# Patient Record
Sex: FEMALE | Race: BLACK OR AFRICAN AMERICAN | Employment: UNEMPLOYED | URBAN - METROPOLITAN AREA
[De-identification: names, ages, dates, MRNs, and addresses within clinical notes are randomized per-mention and may not be internally consistent; named-entity substitution may affect disease eponyms.]

---

## 2021-07-29 ENCOUNTER — TRANSCRIBE ORDER (OUTPATIENT)
Dept: SCHEDULING | Age: 56
End: 2021-07-29

## 2021-07-29 DIAGNOSIS — Z12.31 VISIT FOR SCREENING MAMMOGRAM: Primary | ICD-10-CM

## 2021-09-01 ENCOUNTER — HOSPITAL ENCOUNTER (OUTPATIENT)
Dept: MAMMOGRAPHY | Age: 56
Discharge: HOME | End: 2021-09-01
Payer: COMMERCIAL

## 2021-09-01 DIAGNOSIS — Z12.31 VISIT FOR SCREENING MAMMOGRAM: ICD-10-CM

## 2021-09-01 PROCEDURE — 77063 BREAST TOMOSYNTHESIS BI: CPT

## 2021-11-09 ENCOUNTER — TRANSCRIBE ORDER (OUTPATIENT)
Dept: REGISTRATION | Age: 56
End: 2021-11-09

## 2021-11-09 ENCOUNTER — HOSPITAL ENCOUNTER (OUTPATIENT)
Dept: GENERAL RADIOLOGY | Age: 56
Discharge: HOME | End: 2021-11-09
Payer: COMMERCIAL

## 2021-11-09 DIAGNOSIS — R06.02 SHORTNESS OF BREATH: Primary | ICD-10-CM

## 2021-11-09 DIAGNOSIS — R06.02 SHORTNESS OF BREATH: ICD-10-CM

## 2021-11-09 PROCEDURE — 71046 X-RAY EXAM CHEST 2 VIEWS: CPT

## 2021-11-17 ENCOUNTER — TRANSCRIBE ORDER (OUTPATIENT)
Dept: SCHEDULING | Age: 56
End: 2021-11-17

## 2021-11-17 DIAGNOSIS — R10.9 PAIN, ABDOMINAL: Primary | ICD-10-CM

## 2021-11-22 ENCOUNTER — HOSPITAL ENCOUNTER (OUTPATIENT)
Dept: CT IMAGING | Age: 56
Discharge: HOME | End: 2021-11-22
Attending: INTERNAL MEDICINE
Payer: COMMERCIAL

## 2021-11-22 DIAGNOSIS — R10.9 PAIN, ABDOMINAL: ICD-10-CM

## 2021-11-22 LAB
BUN SERPL-MCNC: 8 MG/DL (ref 6–20)
CREAT SERPL-MCNC: 0.72 MG/DL (ref 0.55–1.02)

## 2021-11-22 PROCEDURE — 74011000636 HC RX REV CODE- 636: Performed by: INTERNAL MEDICINE

## 2021-11-22 PROCEDURE — 74178 CT ABD&PLV WO CNTR FLWD CNTR: CPT

## 2021-11-22 PROCEDURE — 82565 ASSAY OF CREATININE: CPT

## 2021-11-22 PROCEDURE — 84520 ASSAY OF UREA NITROGEN: CPT

## 2021-11-22 PROCEDURE — 36415 COLL VENOUS BLD VENIPUNCTURE: CPT

## 2021-11-22 RX ADMIN — IOPAMIDOL 100 ML: 755 INJECTION, SOLUTION INTRAVENOUS at 14:06

## 2021-12-15 ENCOUNTER — TRANSCRIBE ORDER (OUTPATIENT)
Dept: SCHEDULING | Age: 56
End: 2021-12-15

## 2021-12-15 ENCOUNTER — OFFICE VISIT (OUTPATIENT)
Dept: SURGERY | Age: 56
End: 2021-12-15
Payer: COMMERCIAL

## 2021-12-15 VITALS
HEART RATE: 71 BPM | TEMPERATURE: 98.2 F | OXYGEN SATURATION: 98 % | DIASTOLIC BLOOD PRESSURE: 77 MMHG | SYSTOLIC BLOOD PRESSURE: 126 MMHG | RESPIRATION RATE: 16 BRPM | WEIGHT: 149 LBS | BODY MASS INDEX: 30.04 KG/M2 | HEIGHT: 59 IN

## 2021-12-15 DIAGNOSIS — C77.2 CANCER OF SIGMOID COLON METASTATIC TO INTRA-ABDOMINAL LYMPH NODE (HCC): Primary | ICD-10-CM

## 2021-12-15 DIAGNOSIS — C18.7 CANCER OF SIGMOID COLON METASTATIC TO INTRA-ABDOMINAL LYMPH NODE (HCC): Primary | ICD-10-CM

## 2021-12-15 DIAGNOSIS — D50.9 IRON DEFICIENCY ANEMIA, UNSPECIFIED: Primary | ICD-10-CM

## 2021-12-15 PROCEDURE — 99204 OFFICE O/P NEW MOD 45 MIN: CPT | Performed by: COLON & RECTAL SURGERY

## 2021-12-15 RX ORDER — BUPROPION HYDROCHLORIDE 150 MG/1
1 TABLET ORAL DAILY
COMMUNITY
Start: 2021-11-24

## 2021-12-15 RX ORDER — OMEPRAZOLE 20 MG/1
1 CAPSULE, DELAYED RELEASE ORAL DAILY
COMMUNITY
Start: 2021-11-02

## 2021-12-15 RX ORDER — BUSPIRONE HYDROCHLORIDE 7.5 MG/1
15 TABLET ORAL 2 TIMES DAILY
COMMUNITY
Start: 2021-11-24

## 2021-12-15 RX ORDER — QUETIAPINE FUMARATE 100 MG/1
100 TABLET, FILM COATED ORAL DAILY
COMMUNITY
Start: 2021-11-22

## 2021-12-15 RX ORDER — OXCARBAZEPINE 300 MG/1
1 TABLET, FILM COATED ORAL
COMMUNITY
Start: 2021-11-23

## 2021-12-15 NOTE — H&P (VIEW-ONLY)
OFFICE VISIT NOTE    Fabiana Alfonso is a 49 y.o. female who presents to the office today for:    Chief Complaint   Patient presents with    New Patient     Referred by  for Colon Cancer       Ms. Sabrina Shrestha is referred today for evaluation of a newly diagnosed sigmoid colon cancer. She was seen by gastroenterology with complaints of having mucousy bloody bowel movements. She had a colonoscopy that showed a nearly obstructing cancer/mass in the proximal sigmoid colon which was biopsied and was consistent with adenocarcinoma. She had a CT scan of the abdomen pelvis which demonstrated:  IMPRESSION  1. Segmental circumferential wall thickening and likely intraluminal mass  within the proximal sigmoid colon highly suspicious for colonic malignancy. Multiple enlarged mesenteric lymph nodes suggestive local spread. 2.  Multiple ill-defined hypodensities in the liver are nonspecific. Considerations include early fat deposition or metastatic disease. 3.  Findings suggestive of anemia. Is referred now for surgical evaluation and management. Today she states he continues to have bloody mucousy bowel movements. She also continues to have cramping abdominal pain in the upper abdomen.         Past Medical History:   Diagnosis Date    Anemia     Depression        Past Surgical History:   Procedure Laterality Date    HX  SECTION      2    HX COLONOSCOPY  2021           Family History   Adopted: Yes   Problem Relation Age of Onset    Colon Cancer Mother        Social History     Socioeconomic History    Marital status:      Spouse name: Not on file    Number of children: Not on file    Years of education: Not on file    Highest education level: Not on file   Occupational History    Not on file   Tobacco Use    Smoking status: Current Some Day Smoker Packs/day: 0.25    Smokeless tobacco: Never Used    Tobacco comment: occasionally    Vaping Use    Vaping Use: Never used   Substance and Sexual Activity    Alcohol use: Yes     Comment: occasionally    Drug use: Yes     Types: Marijuana     Comment: occasionally     Sexual activity: Yes     Partners: Male     Birth control/protection: Condom   Other Topics Concern    Not on file   Social History Narrative    Not on file     Social Determinants of Health     Financial Resource Strain:     Difficulty of Paying Living Expenses: Not on file   Food Insecurity:     Worried About Running Out of Food in the Last Year: Not on file    Kalie of Food in the Last Year: Not on file   Transportation Needs:     Lack of Transportation (Medical): Not on file    Lack of Transportation (Non-Medical): Not on file   Physical Activity:     Days of Exercise per Week: Not on file    Minutes of Exercise per Session: Not on file   Stress:     Feeling of Stress : Not on file   Social Connections:     Frequency of Communication with Friends and Family: Not on file    Frequency of Social Gatherings with Friends and Family: Not on file    Attends Mandaen Services: Not on file    Active Member of [address] [place] [address] or Organizations: Not on file    Attends Club or Organization Meetings: Not on file    Marital Status: Not on file   Intimate Partner Violence:     Fear of Current or Ex-Partner: Not on file    Emotionally Abused: Not on file    Physically Abused: Not on file    Sexually Abused: Not on file   Housing Stability:     Unable to Pay for Housing in the Last Year: Not on file    Number of [place] in the Last Year: Not on file    Unstable Housing in the Last Year: Not on file       No Known Allergies    Current Outpatient Medications   Medication Sig    buPROPion XL (WELLBUTRIN XL) 150 mg tablet Take 1 Tablet by mouth daily.  OXcarbazepine (TRILEPTAL) 300 mg tablet Take 1 Tablet by mouth nightly.     busPIRone (BUSPAR) 7.5 mg tablet Take 1 Tablet by mouth two (2) times a day.  omeprazole (PRILOSEC) 20 mg capsule Take 1 Capsule by mouth daily.  QUEtiapine (SEROquel) 100 mg tablet Take 1 Tablet by mouth as needed. No current facility-administered medications for this visit. Review of Systems   Constitutional: Positive for malaise/fatigue. HENT: Negative. Eyes: Negative. Respiratory: Negative. Cardiovascular: Negative. Gastrointestinal: Positive for blood in stool and diarrhea. Genitourinary: Positive for frequency. Musculoskeletal: Negative. Skin: Negative. Neurological: Negative. Endo/Heme/Allergies: Negative. Psychiatric/Behavioral: Positive for depression. The patient has insomnia. /77 (BP 1 Location: Left arm, BP Patient Position: Sitting)   Pulse 71   Temp 98.2 °F (36.8 °C) (Temporal)   Resp 16   Ht 4' 11\" (1.499 m)   Wt 149 lb (67.6 kg)   SpO2 98%   BMI 30.09 kg/m²     Physical Exam  Constitutional:       General: She is not in acute distress. Appearance: Normal appearance. She is obese. She is not ill-appearing. HENT:      Head: Normocephalic and atraumatic. Cardiovascular:      Rate and Rhythm: Normal rate and regular rhythm. Heart sounds: Normal heart sounds. Pulmonary:      Breath sounds: Normal breath sounds. Abdominal:      General: There is no distension. Palpations: Abdomen is soft. There is no mass. Tenderness: There is no abdominal tenderness. Hernia: No hernia is present. Musculoskeletal:         General: Normal range of motion. Cervical back: Normal range of motion and neck supple. Skin:     General: Skin is warm and dry. Neurological:      General: No focal deficit present. Mental Status: She is alert and oriented to person, place, and time. Psychiatric:         Mood and Affect: Mood normal.         Thought Content:  Thought content normal.         Judgment: Judgment normal.         Problem List Items Addressed This Visit     None      Visit Diagnoses     Cancer of sigmoid colon metastatic to intra-abdominal lymph node ([facility] [address]    -  Primary    Relevant Medications    omeprazole (PRILOSEC) 20 mg capsule    Other Relevant Orders    PET/CT NE TUMOR IMAGE SKULL THIGH DOTATATE (INI)          Assessment and Plan:  I had extensive discussion with the patient regarding her endoscopic and radiographic findings. I explained to her that she has a large mass in the proximal sigmoid colon which is near obstructing. I recommended surgery I told she will need to have a low anterior resection with colorectal anastomosis. I will remove the entire ivette basin as it is evidence of enlarged lymph nodes on the CT. I reviewed the risk and benefits occluding the risk of infection, bleeding, wound complications, anastomotic leak, injury to other organs and structures including the ureter. I told her given the location and CT findings up she will need to have ureteric stents placed for identification of the ureter during the case. She is amenable to having the surgery and surgery has been scheduled for December 28, 2021. I have also ordered a PET CT scan to evaluate for metastatic disease given the questions raised in her CT scan of liver lesions.     I also told her that given the fact that she does drink 3 to 4 24-ounce beers a day that she will be carefully monitored for any signs of alcohol withdrawal while in the hospital.            Tawanna Montoya MD

## 2021-12-15 NOTE — PROGRESS NOTES
Chief Complaint   Patient presents with    New Patient     Referred by  for Colon Cancer     Visit Vitals  /77 (BP 1 Location: Left arm, BP Patient Position: Sitting)   Pulse 71   Temp 98.2 °F (36.8 °C) (Temporal)   Resp 16   Ht 4' 11\" (1.499 m)   Wt 149 lb (67.6 kg)   SpO2 98%   BMI 30.09 kg/m²

## 2021-12-15 NOTE — PROGRESS NOTES
OFFICE VISIT NOTE    Liv Espinal is a 49 y.o. female who presents to the office today for:    Chief Complaint   Patient presents with    New Patient     Referred by  for Colon Cancer       Ms. Aixa Jerez is referred today for evaluation of a newly diagnosed sigmoid colon cancer. She was seen by gastroenterology with complaints of having mucousy bloody bowel movements. She had a colonoscopy that showed a nearly obstructing cancer/mass in the proximal sigmoid colon which was biopsied and was consistent with adenocarcinoma. She had a CT scan of the abdomen pelvis which demonstrated:  IMPRESSION  1. Segmental circumferential wall thickening and likely intraluminal mass  within the proximal sigmoid colon highly suspicious for colonic malignancy. Multiple enlarged mesenteric lymph nodes suggestive local spread. 2.  Multiple ill-defined hypodensities in the liver are nonspecific. Considerations include early fat deposition or metastatic disease. 3.  Findings suggestive of anemia. Is referred now for surgical evaluation and management. Today she states he continues to have bloody mucousy bowel movements. She also continues to have cramping abdominal pain in the upper abdomen.         Past Medical History:   Diagnosis Date    Anemia     Depression        Past Surgical History:   Procedure Laterality Date    HX  SECTION      2    HX COLONOSCOPY  2021           Family History   Adopted: Yes   Problem Relation Age of Onset    Colon Cancer Mother        Social History     Socioeconomic History    Marital status:      Spouse name: Not on file    Number of children: Not on file    Years of education: Not on file    Highest education level: Not on file   Occupational History    Not on file   Tobacco Use    Smoking status: Current Some Day Smoker Packs/day: 0.25    Smokeless tobacco: Never Used    Tobacco comment: occasionally    Vaping Use    Vaping Use: Never used   Substance and Sexual Activity    Alcohol use: Yes     Comment: occasionally    Drug use: Yes     Types: Marijuana     Comment: occasionally     Sexual activity: Yes     Partners: Male     Birth control/protection: Condom   Other Topics Concern    Not on file   Social History Narrative    Not on file     Social Determinants of Health     Financial Resource Strain:     Difficulty of Paying Living Expenses: Not on file   Food Insecurity:     Worried About Running Out of Food in the Last Year: Not on file    Kalie of Food in the Last Year: Not on file   Transportation Needs:     Lack of Transportation (Medical): Not on file    Lack of Transportation (Non-Medical): Not on file   Physical Activity:     Days of Exercise per Week: Not on file    Minutes of Exercise per Session: Not on file   Stress:     Feeling of Stress : Not on file   Social Connections:     Frequency of Communication with Friends and Family: Not on file    Frequency of Social Gatherings with Friends and Family: Not on file    Attends [employer] Services: Not on file    Active Member of [address] [place] [address] or Organizations: Not on file    Attends Club or Organization Meetings: Not on file    Marital Status: Not on file   Intimate Partner Violence:     Fear of Current or Ex-Partner: Not on file    Emotionally Abused: Not on file    Physically Abused: Not on file    Sexually Abused: Not on file   Housing Stability:     Unable to Pay for Housing in the Last Year: Not on file    Number of [place] in the Last Year: Not on file    Unstable Housing in the Last Year: Not on file       No Known Allergies    Current Outpatient Medications   Medication Sig    buPROPion XL (WELLBUTRIN XL) 150 mg tablet Take 1 Tablet by mouth daily.  OXcarbazepine (TRILEPTAL) 300 mg tablet Take 1 Tablet by mouth nightly.     busPIRone (BUSPAR) 7.5 mg tablet Take 1 Tablet by mouth two (2) times a day.  omeprazole (PRILOSEC) 20 mg capsule Take 1 Capsule by mouth daily.  QUEtiapine (SEROquel) 100 mg tablet Take 1 Tablet by mouth as needed. No current facility-administered medications for this visit. Review of Systems   Constitutional: Positive for malaise/fatigue. HENT: Negative. Eyes: Negative. Respiratory: Negative. Cardiovascular: Negative. Gastrointestinal: Positive for blood in stool and diarrhea. Genitourinary: Positive for frequency. Musculoskeletal: Negative. Skin: Negative. Neurological: Negative. Endo/Heme/Allergies: Negative. Psychiatric/Behavioral: Positive for depression. The patient has insomnia. /77 (BP 1 Location: Left arm, BP Patient Position: Sitting)   Pulse 71   Temp 98.2 °F (36.8 °C) (Temporal)   Resp 16   Ht 4' 11\" (1.499 m)   Wt 149 lb (67.6 kg)   SpO2 98%   BMI 30.09 kg/m²     Physical Exam  Constitutional:       General: She is not in acute distress. Appearance: Normal appearance. She is obese. She is not ill-appearing. HENT:      Head: Normocephalic and atraumatic. Cardiovascular:      Rate and Rhythm: Normal rate and regular rhythm. Heart sounds: Normal heart sounds. Pulmonary:      Breath sounds: Normal breath sounds. Abdominal:      General: There is no distension. Palpations: Abdomen is soft. There is no mass. Tenderness: There is no abdominal tenderness. Hernia: No hernia is present. Musculoskeletal:         General: Normal range of motion. Cervical back: Normal range of motion and neck supple. Skin:     General: Skin is warm and dry. Neurological:      General: No focal deficit present. Mental Status: She is alert and oriented to person, place, and time. Psychiatric:         Mood and Affect: Mood normal.         Thought Content:  Thought content normal.         Judgment: Judgment normal.         Problem List Items Addressed This Visit     None      Visit Diagnoses     Cancer of sigmoid colon metastatic to intra-abdominal lymph node ([facility] [address]    -  Primary    Relevant Medications    omeprazole (PRILOSEC) 20 mg capsule    Other Relevant Orders    PET/CT NE TUMOR IMAGE SKULL THIGH DOTATATE (INI)          Assessment and Plan:  I had extensive discussion with the patient regarding her endoscopic and radiographic findings. I explained to her that she has a large mass in the proximal sigmoid colon which is near obstructing. I recommended surgery I told she will need to have a low anterior resection with colorectal anastomosis. I will remove the entire ivette basin as it is evidence of enlarged lymph nodes on the CT. I reviewed the risk and benefits occluding the risk of infection, bleeding, wound complications, anastomotic leak, injury to other organs and structures including the ureter. I told her given the location and CT findings up she will need to have ureteric stents placed for identification of the ureter during the case. She is amenable to having the surgery and surgery has been scheduled for December 28, 2021. I have also ordered a PET CT scan to evaluate for metastatic disease given the questions raised in her CT scan of liver lesions.     I also told her that given the fact that she does drink 3 to 4 24-ounce beers a day that she will be carefully monitored for any signs of alcohol withdrawal while in the hospital.            Timm Olszewski, MD

## 2021-12-16 DIAGNOSIS — C77.2 CANCER OF SIGMOID COLON METASTATIC TO INTRA-ABDOMINAL LYMPH NODE (HCC): Primary | ICD-10-CM

## 2021-12-16 DIAGNOSIS — C18.7 CANCER OF SIGMOID COLON METASTATIC TO INTRA-ABDOMINAL LYMPH NODE (HCC): Primary | ICD-10-CM

## 2021-12-16 DIAGNOSIS — C18.7 MALIGNANT NEOPLASM OF SIGMOID COLON (HCC): Primary | ICD-10-CM

## 2021-12-20 ENCOUNTER — TELEPHONE (OUTPATIENT)
Dept: SURGERY | Age: 56
End: 2021-12-20

## 2021-12-20 ENCOUNTER — HOSPITAL ENCOUNTER (OUTPATIENT)
Dept: PET IMAGING | Age: 56
Discharge: HOME | End: 2021-12-20
Attending: COLON & RECTAL SURGERY
Payer: COMMERCIAL

## 2021-12-20 DIAGNOSIS — C18.7 CANCER OF SIGMOID COLON METASTATIC TO INTRA-ABDOMINAL LYMPH NODE (HCC): ICD-10-CM

## 2021-12-20 DIAGNOSIS — C77.2 CANCER OF SIGMOID COLON METASTATIC TO INTRA-ABDOMINAL LYMPH NODE (HCC): ICD-10-CM

## 2021-12-20 PROCEDURE — 78815 PET IMAGE W/CT SKULL-THIGH: CPT

## 2021-12-20 RX ORDER — FLUDEOXYGLUCOSE F-18 200 MCI/ML
8.54 INJECTION INTRAVENOUS ONCE
Status: COMPLETED | OUTPATIENT
Start: 2021-12-20 | End: 2021-12-20

## 2021-12-20 RX ORDER — POLYETHYLENE GLYCOL 3350, SODIUM SULFATE ANHYDROUS, SODIUM BICARBONATE, SODIUM CHLORIDE, POTASSIUM CHLORIDE 236; 22.74; 6.74; 5.86; 2.97 G/4L; G/4L; G/4L; G/4L; G/4L
POWDER, FOR SOLUTION ORAL
Qty: 4000 ML | Refills: 0 | Status: SHIPPED | OUTPATIENT
Start: 2021-12-20 | End: 2022-01-21

## 2021-12-20 RX ADMIN — FLUDEOXYGLUCOSE F-18 8.54 MILLICURIE: 200 INJECTION INTRAVENOUS at 14:00

## 2021-12-20 NOTE — TELEPHONE ENCOUNTER
Spoke with . He states that the patient can proceed with Iron Infusions and can try Gas X over the counter. I made patient aware as well.

## 2021-12-20 NOTE — TELEPHONE ENCOUNTER
Patient called stated is having issues bloating, gas pains,constipation, and diarrhea.  Stated is scheduled for surgery  with Dr Glynda Schaumann on 12/28/2021 and would like to speak with the nurse to advise [phone]

## 2021-12-20 NOTE — TELEPHONE ENCOUNTER
Patient states she is full of gas and would like to know if there is anything over the counter she can take. She states that she is supposed to get iron infusions with 's office starting tomorrow. She would like to know if this is okay and she can proceed.

## 2021-12-21 ENCOUNTER — TELEPHONE (OUTPATIENT)
Dept: SURGERY | Age: 56
End: 2021-12-21

## 2021-12-21 NOTE — TELEPHONE ENCOUNTER
Patient came into the office complaining of Bright Red and dark blood when passing stools. She states she feels like her insides are going to fall out. She is not as bloated as she was yesterday since she had a bowel movement. She did not take the Gas X. Her Oncologist did blood work and she is going to follow up with them today. She stopped by to see if  would give her anything for pain.

## 2021-12-23 ENCOUNTER — HOSPITAL ENCOUNTER (OUTPATIENT)
Dept: PREADMISSION TESTING | Age: 56
Discharge: HOME | End: 2021-12-23
Payer: COMMERCIAL

## 2021-12-23 VITALS
DIASTOLIC BLOOD PRESSURE: 86 MMHG | WEIGHT: 150.13 LBS | TEMPERATURE: 98.4 F | HEART RATE: 69 BPM | HEIGHT: 59 IN | OXYGEN SATURATION: 100 % | BODY MASS INDEX: 30.27 KG/M2 | RESPIRATION RATE: 16 BRPM | SYSTOLIC BLOOD PRESSURE: 121 MMHG

## 2021-12-23 LAB
ABO + RH BLD: NORMAL
BLOOD GROUP ANTIBODIES SERPL: NEGATIVE
ERYTHROCYTE [DISTWIDTH] IN BLOOD BY AUTOMATED COUNT: 22.1 % (ref 11.5–14.5)
HCT VFR BLD AUTO: 27.9 % (ref 35–47)
HGB BLD-MCNC: 7.8 G/DL (ref 11.5–16)
MCH RBC QN AUTO: 17.3 PG (ref 26–34)
MCHC RBC AUTO-ENTMCNC: 28 G/DL (ref 30–36.5)
MCV RBC AUTO: 61.9 FL (ref 80–99)
MRSA DNA SPEC QL NAA+PROBE: NOT DETECTED
NRBC # BLD: 0 K/UL (ref 0–0.01)
NRBC BLD-RTO: 0 PER 100 WBC
PLATELET # BLD AUTO: 498 K/UL (ref 150–400)
PMV BLD AUTO: 8.4 FL (ref 8.9–12.9)
RBC # BLD AUTO: 4.51 M/UL (ref 3.8–5.2)
SPECIMEN EXP DATE BLD: NORMAL
WBC # BLD AUTO: 6.2 K/UL (ref 3.6–11)

## 2021-12-23 PROCEDURE — 85027 COMPLETE CBC AUTOMATED: CPT

## 2021-12-23 PROCEDURE — U0005 INFEC AGEN DETEC AMPLI PROBE: HCPCS

## 2021-12-23 PROCEDURE — 86900 BLOOD TYPING SEROLOGIC ABO: CPT

## 2021-12-23 PROCEDURE — 36415 COLL VENOUS BLD VENIPUNCTURE: CPT

## 2021-12-23 PROCEDURE — 87641 MR-STAPH DNA AMP PROBE: CPT

## 2021-12-23 RX ORDER — TRAMADOL HYDROCHLORIDE 50 MG/1
50 TABLET ORAL
COMMUNITY
End: 2022-01-21

## 2021-12-23 RX ORDER — IBUPROFEN 800 MG/1
800 TABLET ORAL AS NEEDED
COMMUNITY
End: 2022-01-21

## 2021-12-23 RX ORDER — TRIAMCINOLONE ACETONIDE 1 MG/G
OINTMENT TOPICAL DAILY
COMMUNITY
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
## 2021-12-23 NOTE — PERIOP NOTES
1 Dr. Hinds Deaver called, stated no new orders needed re: CBC result, ok to proceed with surgery as planned.

## 2021-12-23 NOTE — PERIOP NOTES
1200 Dr. Owen Ambrocio cell # called, [address] nurse answered , made aware of CBC result, hgb 7.8, hct 27.9 and platelet count 800, nurse to make Dr. Catalina Hennessy aware and have  call back to PAT for any needed new orders.

## 2021-12-23 NOTE — PERIOP NOTES
7274 Dr. Eliza Meredith office called, left a message for Atiya Cottrell LPN to make Dr. Mason Mckeon aware of pt's request for assistance with discharge planning post op as previously noted.

## 2021-12-25 LAB
SARS-COV-2, XPLCVT: NOT DETECTED
SOURCE, COVRS: NORMAL

## 2022-01-05 ENCOUNTER — DOCUMENTATION ONLY (OUTPATIENT)
Dept: CASE MANAGEMENT | Age: 57
End: 2022-01-05

## 2022-01-05 NOTE — PROGRESS NOTES
Left patient a vm introducing myself and my role as ONN. Encouraged patient to call if support is needed and advised that I will mail resources and support group info. Pt to have surgery on 1/10/22.

## 2022-01-06 ENCOUNTER — TELEPHONE (OUTPATIENT)
Dept: SURGERY | Age: 57
End: 2022-01-06

## 2022-01-06 ENCOUNTER — HOSPITAL ENCOUNTER (OUTPATIENT)
Dept: PREADMISSION TESTING | Age: 57
Discharge: HOME | End: 2022-01-06
Payer: COMMERCIAL

## 2022-01-06 ENCOUNTER — NURSE NAVIGATOR (OUTPATIENT)
Dept: CASE MANAGEMENT | Age: 57
End: 2022-01-06

## 2022-01-06 VITALS
HEART RATE: 78 BPM | BODY MASS INDEX: 29.23 KG/M2 | WEIGHT: 145 LBS | SYSTOLIC BLOOD PRESSURE: 96 MMHG | HEIGHT: 59 IN | DIASTOLIC BLOOD PRESSURE: 62 MMHG | RESPIRATION RATE: 16 BRPM | OXYGEN SATURATION: 98 % | TEMPERATURE: 98.2 F

## 2022-01-06 LAB
ABO + RH BLD: NORMAL
BLOOD GROUP ANTIBODIES SERPL: NEGATIVE
ERYTHROCYTE [DISTWIDTH] IN BLOOD BY AUTOMATED COUNT: 32 % (ref 11.5–14.5)
HCT VFR BLD AUTO: 33 % (ref 35–47)
HGB BLD-MCNC: 9.7 G/DL (ref 11.5–16)
MCH RBC QN AUTO: 19.8 PG (ref 26–34)
MCHC RBC AUTO-ENTMCNC: 29.4 G/DL (ref 30–36.5)
MCV RBC AUTO: 67.3 FL (ref 80–99)
NRBC # BLD: 0 K/UL (ref 0–0.01)
NRBC BLD-RTO: 0 PER 100 WBC
PLATELET # BLD AUTO: 660 K/UL (ref 150–400)
PMV BLD AUTO: 8.6 FL (ref 8.9–12.9)
RBC # BLD AUTO: 4.9 M/UL (ref 3.8–5.2)
SARS-COV-2, COV2: NORMAL
SPECIMEN EXP DATE BLD: NORMAL
WBC # BLD AUTO: 6.8 K/UL (ref 3.6–11)

## 2022-01-06 PROCEDURE — 85027 COMPLETE CBC AUTOMATED: CPT

## 2022-01-06 PROCEDURE — 36415 COLL VENOUS BLD VENIPUNCTURE: CPT

## 2022-01-06 PROCEDURE — U0005 INFEC AGEN DETEC AMPLI PROBE: HCPCS

## 2022-01-06 PROCEDURE — 86900 BLOOD TYPING SEROLOGIC ABO: CPT

## 2022-01-06 RX ORDER — DRONABINOL 2.5 MG/1
2.5 CAPSULE ORAL 2 TIMES DAILY
COMMUNITY
End: 2022-01-21

## 2022-01-06 RX ORDER — OXYCODONE HYDROCHLORIDE 5 MG/1
5 TABLET ORAL
COMMUNITY

## 2022-01-06 NOTE — TELEPHONE ENCOUNTER
Got message from [facility] of answering machine, she states this pt hemoglobin is 9.7. Tried calling Dr. Moshe Cohen for FYI> phone goes straight to voice mail and box is full, unable to leave message. I will need to contact him on am,.

## 2022-01-07 ENCOUNTER — TELEPHONE (OUTPATIENT)
Dept: SURGERY | Age: 57
End: 2022-01-07

## 2022-01-07 NOTE — ROUTINE PROCESS
Dr Ya Granados notified about Hgb of 9.7-- patient is already Type and Screened-- stated ok to proceed.
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
## 2022-01-09 LAB
SARS-COV-2, XPLCVT: NOT DETECTED
SOURCE, COVRS: NORMAL

## 2022-01-10 ENCOUNTER — ANESTHESIA (OUTPATIENT)
Dept: SURGERY | Age: 57
DRG: 231 | End: 2022-01-10
Payer: COMMERCIAL

## 2022-01-10 ENCOUNTER — HOSPITAL ENCOUNTER (INPATIENT)
Age: 57
LOS: 11 days | Discharge: HOME | DRG: 231 | End: 2022-01-21
Attending: COLON & RECTAL SURGERY | Admitting: COLON & RECTAL SURGERY
Payer: COMMERCIAL

## 2022-01-10 ENCOUNTER — ANESTHESIA EVENT (OUTPATIENT)
Dept: SURGERY | Age: 57
DRG: 231 | End: 2022-01-10
Payer: COMMERCIAL

## 2022-01-10 PROBLEM — C18.9 METASTATIC COLON CANCER TO LIVER (HCC): Status: ACTIVE | Noted: 2022-01-10

## 2022-01-10 PROBLEM — C78.7 METASTATIC COLON CANCER TO LIVER (HCC): Status: ACTIVE | Noted: 2022-01-10

## 2022-01-10 PROBLEM — C18.7 CANCER OF SIGMOID COLON (HCC): Status: ACTIVE | Noted: 2022-01-10

## 2022-01-10 LAB
ANION GAP SERPL CALC-SCNC: 6 MMOL/L (ref 5–15)
APTT PPP: 29.2 SEC (ref 21.2–34.1)
BASOPHILS # BLD: 0.1 K/UL (ref 0–0.1)
BASOPHILS NFR BLD: 1 % (ref 0–1)
BUN SERPL-MCNC: 5 MG/DL (ref 6–20)
BUN/CREAT SERPL: 8 (ref 12–20)
CA-I BLD-MCNC: 8.4 MG/DL (ref 8.5–10.1)
CHLORIDE SERPL-SCNC: 108 MMOL/L (ref 97–108)
CO2 SERPL-SCNC: 26 MMOL/L (ref 21–32)
CREAT SERPL-MCNC: 0.61 MG/DL (ref 0.55–1.02)
DIFFERENTIAL METHOD BLD: ABNORMAL
EOSINOPHIL # BLD: 0.4 K/UL (ref 0–0.4)
EOSINOPHIL NFR BLD: 3 % (ref 0–7)
ERYTHROCYTE [DISTWIDTH] IN BLOOD BY AUTOMATED COUNT: 31.7 % (ref 11.5–14.5)
GLUCOSE SERPL-MCNC: 92 MG/DL (ref 65–100)
HCT VFR BLD AUTO: 33.1 % (ref 35–47)
HGB BLD-MCNC: 9.8 G/DL (ref 11.5–16)
IMM GRANULOCYTES # BLD AUTO: 0 K/UL (ref 0–0.04)
IMM GRANULOCYTES NFR BLD AUTO: 0 % (ref 0–0.5)
INR PPP: 1.1 (ref 0.9–1.1)
LYMPHOCYTES # BLD: 2.1 K/UL (ref 0.8–3.5)
LYMPHOCYTES NFR BLD: 20 % (ref 12–49)
MCH RBC QN AUTO: 20 PG (ref 26–34)
MCHC RBC AUTO-ENTMCNC: 29.6 G/DL (ref 30–36.5)
MCV RBC AUTO: 67.7 FL (ref 80–99)
MONOCYTES # BLD: 0.8 K/UL (ref 0–1)
MONOCYTES NFR BLD: 8 % (ref 5–13)
NEUTS SEG # BLD: 7 K/UL (ref 1.8–8)
NEUTS SEG NFR BLD: 68 % (ref 32–75)
NRBC # BLD: 0 K/UL (ref 0–0.01)
NRBC BLD-RTO: 0 PER 100 WBC
PLATELET # BLD AUTO: 684 K/UL (ref 150–400)
PMV BLD AUTO: 8.5 FL (ref 8.9–12.9)
POTASSIUM SERPL-SCNC: 3.2 MMOL/L (ref 3.5–5.1)
PROTHROMBIN TIME: 14.2 SEC (ref 11.9–14.7)
RBC # BLD AUTO: 4.89 M/UL (ref 3.8–5.2)
SODIUM SERPL-SCNC: 140 MMOL/L (ref 136–145)
THERAPEUTIC RANGE,PTTT: NORMAL SEC (ref 82–109)
WBC # BLD AUTO: 10.4 K/UL (ref 3.6–11)

## 2022-01-10 PROCEDURE — 77030040361 HC SLV COMPR DVT MDII -B: Performed by: COLON & RECTAL SURGERY

## 2022-01-10 PROCEDURE — 74011000250 HC RX REV CODE- 250: Performed by: COLON & RECTAL SURGERY

## 2022-01-10 PROCEDURE — 44145 PARTIAL REMOVAL OF COLON: CPT | Performed by: COLON & RECTAL SURGERY

## 2022-01-10 PROCEDURE — 74011250636 HC RX REV CODE- 250/636: Performed by: COLON & RECTAL SURGERY

## 2022-01-10 PROCEDURE — 74011250637 HC RX REV CODE- 250/637: Performed by: ANESTHESIOLOGY

## 2022-01-10 PROCEDURE — 77030009967 HC RELD STPLR ENDOSC J&J -C: Performed by: COLON & RECTAL SURGERY

## 2022-01-10 PROCEDURE — 80048 BASIC METABOLIC PNL TOTAL CA: CPT

## 2022-01-10 PROCEDURE — 52332 CYSTOSCOPY AND TREATMENT: CPT | Performed by: UROLOGY

## 2022-01-10 PROCEDURE — 74011250636 HC RX REV CODE- 250/636: Performed by: NURSE ANESTHETIST, CERTIFIED REGISTERED

## 2022-01-10 PROCEDURE — 76060000037 HC ANESTHESIA 3 TO 3.5 HR: Performed by: COLON & RECTAL SURGERY

## 2022-01-10 PROCEDURE — 0DTJ0ZZ RESECTION OF APPENDIX, OPEN APPROACH: ICD-10-PCS | Performed by: COLON & RECTAL SURGERY

## 2022-01-10 PROCEDURE — 74011250636 HC RX REV CODE- 250/636: Performed by: ANESTHESIOLOGY

## 2022-01-10 PROCEDURE — 77030040923 HC STPLR ENDOSC ECHELON J&J -E: Performed by: COLON & RECTAL SURGERY

## 2022-01-10 PROCEDURE — 65270000029 HC RM PRIVATE

## 2022-01-10 PROCEDURE — 85730 THROMBOPLASTIN TIME PARTIAL: CPT

## 2022-01-10 PROCEDURE — 74011000250 HC RX REV CODE- 250: Performed by: NURSE ANESTHETIST, CERTIFIED REGISTERED

## 2022-01-10 PROCEDURE — 77030003029 HC SUT VCRL J&J -B: Performed by: COLON & RECTAL SURGERY

## 2022-01-10 PROCEDURE — 77030008463 HC STPLR SKN PROX J&J -B: Performed by: COLON & RECTAL SURGERY

## 2022-01-10 PROCEDURE — 74011250637 HC RX REV CODE- 250/637: Performed by: COLON & RECTAL SURGERY

## 2022-01-10 PROCEDURE — 77030009979 HC RELD STPLR TCR J&J -C: Performed by: COLON & RECTAL SURGERY

## 2022-01-10 PROCEDURE — 74011000258 HC RX REV CODE- 258: Performed by: COLON & RECTAL SURGERY

## 2022-01-10 PROCEDURE — 0DTP0ZZ RESECTION OF RECTUM, OPEN APPROACH: ICD-10-PCS | Performed by: COLON & RECTAL SURGERY

## 2022-01-10 PROCEDURE — 77030011808 HC STPLR ENDOSCOPIC J&J -D: Performed by: COLON & RECTAL SURGERY

## 2022-01-10 PROCEDURE — 76210000016 HC OR PH I REC 1 TO 1.5 HR: Performed by: COLON & RECTAL SURGERY

## 2022-01-10 PROCEDURE — 77030011810 HC STPLR ENDOSC J&J -G: Performed by: COLON & RECTAL SURGERY

## 2022-01-10 PROCEDURE — 74011250636 HC RX REV CODE- 250/636

## 2022-01-10 PROCEDURE — 36415 COLL VENOUS BLD VENIPUNCTURE: CPT

## 2022-01-10 PROCEDURE — 85025 COMPLETE CBC W/AUTO DIFF WBC: CPT

## 2022-01-10 PROCEDURE — 77030011264 HC ELECTRD BLD EXT COVD -A: Performed by: COLON & RECTAL SURGERY

## 2022-01-10 PROCEDURE — 44139 MOBILIZATION OF COLON: CPT | Performed by: COLON & RECTAL SURGERY

## 2022-01-10 PROCEDURE — 74011000258 HC RX REV CODE- 258: Performed by: NURSE ANESTHETIST, CERTIFIED REGISTERED

## 2022-01-10 PROCEDURE — 77030002966 HC SUT PDS J&J -A: Performed by: COLON & RECTAL SURGERY

## 2022-01-10 PROCEDURE — 88309 TISSUE EXAM BY PATHOLOGIST: CPT

## 2022-01-10 PROCEDURE — 76010000133 HC OR TIME 3 TO 3.5 HR: Performed by: COLON & RECTAL SURGERY

## 2022-01-10 PROCEDURE — 77030031139 HC SUT VCRL2 J&J -A: Performed by: COLON & RECTAL SURGERY

## 2022-01-10 PROCEDURE — 85610 PROTHROMBIN TIME: CPT

## 2022-01-10 PROCEDURE — C1769 GUIDE WIRE: HCPCS | Performed by: COLON & RECTAL SURGERY

## 2022-01-10 PROCEDURE — 2709999900 HC NON-CHARGEABLE SUPPLY: Performed by: COLON & RECTAL SURGERY

## 2022-01-10 PROCEDURE — 0DTN0ZZ RESECTION OF SIGMOID COLON, OPEN APPROACH: ICD-10-PCS | Performed by: COLON & RECTAL SURGERY

## 2022-01-10 PROCEDURE — 88307 TISSUE EXAM BY PATHOLOGIST: CPT

## 2022-01-10 PROCEDURE — 77030018390 HC SPNG HEMSTAT2 J&J -B: Performed by: COLON & RECTAL SURGERY

## 2022-01-10 PROCEDURE — 77030008462 HC STPLR SKN PROX J&J -A: Performed by: COLON & RECTAL SURGERY

## 2022-01-10 PROCEDURE — 47120 PARTIAL REMOVAL OF LIVER: CPT | Performed by: COLON & RECTAL SURGERY

## 2022-01-10 PROCEDURE — 77030005513 HC CATH URETH FOL11 MDII -B: Performed by: COLON & RECTAL SURGERY

## 2022-01-10 PROCEDURE — 0T788DZ DILATION OF BILATERAL URETERS WITH INTRALUMINAL DEVICE, VIA NATURAL OR ARTIFICIAL OPENING ENDOSCOPIC: ICD-10-PCS | Performed by: COLON & RECTAL SURGERY

## 2022-01-10 PROCEDURE — 88302 TISSUE EXAM BY PATHOLOGIST: CPT

## 2022-01-10 PROCEDURE — 77030036731 HC STPLR ENDOSC J&J -F: Performed by: COLON & RECTAL SURGERY

## 2022-01-10 RX ORDER — SODIUM CHLORIDE 0.9 % (FLUSH) 0.9 %
5-40 SYRINGE (ML) INJECTION EVERY 8 HOURS
Status: DISCONTINUED | OUTPATIENT
Start: 2022-01-10 | End: 2022-01-10 | Stop reason: HOSPADM

## 2022-01-10 RX ORDER — HYDROMORPHONE HYDROCHLORIDE 1 MG/ML
1 INJECTION, SOLUTION INTRAMUSCULAR; INTRAVENOUS; SUBCUTANEOUS
Status: DISCONTINUED | OUTPATIENT
Start: 2022-01-10 | End: 2022-01-11

## 2022-01-10 RX ORDER — FENTANYL CITRATE 50 UG/ML
25 INJECTION, SOLUTION INTRAMUSCULAR; INTRAVENOUS
Status: DISCONTINUED | OUTPATIENT
Start: 2022-01-10 | End: 2022-01-10

## 2022-01-10 RX ORDER — LIDOCAINE HYDROCHLORIDE 10 MG/ML
0.1 INJECTION, SOLUTION EPIDURAL; INFILTRATION; INTRACAUDAL; PERINEURAL AS NEEDED
Status: DISCONTINUED | OUTPATIENT
Start: 2022-01-10 | End: 2022-01-10 | Stop reason: HOSPADM

## 2022-01-10 RX ORDER — MIDAZOLAM HYDROCHLORIDE 1 MG/ML
INJECTION, SOLUTION INTRAMUSCULAR; INTRAVENOUS AS NEEDED
Status: DISCONTINUED | OUTPATIENT
Start: 2022-01-10 | End: 2022-01-10 | Stop reason: HOSPADM

## 2022-01-10 RX ORDER — BUPROPION HYDROCHLORIDE 150 MG/1
150 TABLET ORAL DAILY
Status: DISCONTINUED | OUTPATIENT
Start: 2022-01-11 | End: 2022-01-21 | Stop reason: HOSPADM

## 2022-01-10 RX ORDER — BUSPIRONE HYDROCHLORIDE 5 MG/1
7.5 TABLET ORAL 2 TIMES DAILY
Status: DISCONTINUED | OUTPATIENT
Start: 2022-01-10 | End: 2022-01-21 | Stop reason: HOSPADM

## 2022-01-10 RX ORDER — SODIUM CHLORIDE, SODIUM LACTATE, POTASSIUM CHLORIDE, CALCIUM CHLORIDE 600; 310; 30; 20 MG/100ML; MG/100ML; MG/100ML; MG/100ML
20 INJECTION, SOLUTION INTRAVENOUS CONTINUOUS
Status: DISCONTINUED | OUTPATIENT
Start: 2022-01-10 | End: 2022-01-10

## 2022-01-10 RX ORDER — OXYCODONE HYDROCHLORIDE 5 MG/1
5 TABLET ORAL
Status: DISCONTINUED | OUTPATIENT
Start: 2022-01-10 | End: 2022-01-10

## 2022-01-10 RX ORDER — ONDANSETRON 2 MG/ML
4 INJECTION INTRAMUSCULAR; INTRAVENOUS
Status: DISCONTINUED | OUTPATIENT
Start: 2022-01-10 | End: 2022-01-21 | Stop reason: HOSPADM

## 2022-01-10 RX ORDER — OXCARBAZEPINE 150 MG/1
300 TABLET, FILM COATED ORAL
Status: DISCONTINUED | OUTPATIENT
Start: 2022-01-10 | End: 2022-01-21 | Stop reason: HOSPADM

## 2022-01-10 RX ORDER — SODIUM CHLORIDE, SODIUM LACTATE, POTASSIUM CHLORIDE, CALCIUM CHLORIDE 600; 310; 30; 20 MG/100ML; MG/100ML; MG/100ML; MG/100ML
INJECTION, SOLUTION INTRAVENOUS
Status: DISCONTINUED | OUTPATIENT
Start: 2022-01-10 | End: 2022-01-10 | Stop reason: HOSPADM

## 2022-01-10 RX ORDER — HYDROMORPHONE HYDROCHLORIDE 1 MG/ML
0.2 INJECTION, SOLUTION INTRAMUSCULAR; INTRAVENOUS; SUBCUTANEOUS
Status: DISCONTINUED | OUTPATIENT
Start: 2022-01-10 | End: 2022-01-10

## 2022-01-10 RX ORDER — SODIUM CHLORIDE 0.9 % (FLUSH) 0.9 %
5-40 SYRINGE (ML) INJECTION AS NEEDED
Status: DISCONTINUED | OUTPATIENT
Start: 2022-01-10 | End: 2022-01-10 | Stop reason: HOSPADM

## 2022-01-10 RX ORDER — FENTANYL CITRATE 50 UG/ML
INJECTION, SOLUTION INTRAMUSCULAR; INTRAVENOUS
Status: COMPLETED
Start: 2022-01-10 | End: 2022-01-10

## 2022-01-10 RX ORDER — SODIUM CHLORIDE 9 MG/ML
INJECTION, SOLUTION INTRAVENOUS
Status: DISCONTINUED | OUTPATIENT
Start: 2022-01-10 | End: 2022-01-10 | Stop reason: HOSPADM

## 2022-01-10 RX ORDER — ONDANSETRON 2 MG/ML
4 INJECTION INTRAMUSCULAR; INTRAVENOUS AS NEEDED
Status: DISCONTINUED | OUTPATIENT
Start: 2022-01-10 | End: 2022-01-10 | Stop reason: HOSPADM

## 2022-01-10 RX ORDER — SODIUM CHLORIDE 9 MG/ML
125 INJECTION, SOLUTION INTRAVENOUS CONTINUOUS
Status: DISCONTINUED | OUTPATIENT
Start: 2022-01-10 | End: 2022-01-12

## 2022-01-10 RX ORDER — MORPHINE SULFATE 2 MG/ML
INJECTION, SOLUTION INTRAMUSCULAR; INTRAVENOUS
Status: DISPENSED
Start: 2022-01-10 | End: 2022-01-11

## 2022-01-10 RX ORDER — DEXAMETHASONE SODIUM PHOSPHATE 4 MG/ML
INJECTION, SOLUTION INTRA-ARTICULAR; INTRALESIONAL; INTRAMUSCULAR; INTRAVENOUS; SOFT TISSUE AS NEEDED
Status: DISCONTINUED | OUTPATIENT
Start: 2022-01-10 | End: 2022-01-10 | Stop reason: HOSPADM

## 2022-01-10 RX ORDER — ONDANSETRON 2 MG/ML
INJECTION INTRAMUSCULAR; INTRAVENOUS AS NEEDED
Status: DISCONTINUED | OUTPATIENT
Start: 2022-01-10 | End: 2022-01-10 | Stop reason: HOSPADM

## 2022-01-10 RX ORDER — LIDOCAINE HYDROCHLORIDE 20 MG/ML
INJECTION, SOLUTION EPIDURAL; INFILTRATION; INTRACAUDAL; PERINEURAL AS NEEDED
Status: DISCONTINUED | OUTPATIENT
Start: 2022-01-10 | End: 2022-01-10 | Stop reason: HOSPADM

## 2022-01-10 RX ORDER — SODIUM CHLORIDE 0.9 % (FLUSH) 0.9 %
5-40 SYRINGE (ML) INJECTION AS NEEDED
Status: DISCONTINUED | OUTPATIENT
Start: 2022-01-10 | End: 2022-01-10

## 2022-01-10 RX ORDER — SCOLOPAMINE TRANSDERMAL SYSTEM 1 MG/1
1 PATCH, EXTENDED RELEASE TRANSDERMAL ONCE
Status: DISCONTINUED | OUTPATIENT
Start: 2022-01-10 | End: 2022-01-10

## 2022-01-10 RX ORDER — ACETAMINOPHEN 500 MG
1000 TABLET ORAL ONCE
Status: COMPLETED | OUTPATIENT
Start: 2022-01-10 | End: 2022-01-10

## 2022-01-10 RX ORDER — SODIUM CHLORIDE 0.9 % (FLUSH) 0.9 %
5-40 SYRINGE (ML) INJECTION EVERY 8 HOURS
Status: DISCONTINUED | OUTPATIENT
Start: 2022-01-10 | End: 2022-01-10

## 2022-01-10 RX ORDER — ROCURONIUM BROMIDE 10 MG/ML
INJECTION, SOLUTION INTRAVENOUS AS NEEDED
Status: DISCONTINUED | OUTPATIENT
Start: 2022-01-10 | End: 2022-01-10 | Stop reason: HOSPADM

## 2022-01-10 RX ORDER — QUETIAPINE FUMARATE 100 MG/1
100 TABLET, FILM COATED ORAL DAILY
Status: DISCONTINUED | OUTPATIENT
Start: 2022-01-11 | End: 2022-01-17

## 2022-01-10 RX ORDER — MORPHINE SULFATE 2 MG/ML
2 INJECTION, SOLUTION INTRAMUSCULAR; INTRAVENOUS
Status: DISCONTINUED | OUTPATIENT
Start: 2022-01-10 | End: 2022-01-10

## 2022-01-10 RX ORDER — FENTANYL CITRATE 50 UG/ML
INJECTION, SOLUTION INTRAMUSCULAR; INTRAVENOUS AS NEEDED
Status: DISCONTINUED | OUTPATIENT
Start: 2022-01-10 | End: 2022-01-10 | Stop reason: HOSPADM

## 2022-01-10 RX ORDER — PROPOFOL 10 MG/ML
INJECTION, EMULSION INTRAVENOUS AS NEEDED
Status: DISCONTINUED | OUTPATIENT
Start: 2022-01-10 | End: 2022-01-10 | Stop reason: HOSPADM

## 2022-01-10 RX ORDER — METRONIDAZOLE 500 MG/100ML
500 INJECTION, SOLUTION INTRAVENOUS ONCE
Status: DISCONTINUED | OUTPATIENT
Start: 2022-01-10 | End: 2022-01-10

## 2022-01-10 RX ORDER — SODIUM CHLORIDE, SODIUM LACTATE, POTASSIUM CHLORIDE, CALCIUM CHLORIDE 600; 310; 30; 20 MG/100ML; MG/100ML; MG/100ML; MG/100ML
50 INJECTION, SOLUTION INTRAVENOUS CONTINUOUS
Status: DISCONTINUED | OUTPATIENT
Start: 2022-01-10 | End: 2022-01-10 | Stop reason: HOSPADM

## 2022-01-10 RX ORDER — SODIUM CHLORIDE, SODIUM LACTATE, POTASSIUM CHLORIDE, CALCIUM CHLORIDE 600; 310; 30; 20 MG/100ML; MG/100ML; MG/100ML; MG/100ML
1000 INJECTION, SOLUTION INTRAVENOUS CONTINUOUS
Status: DISCONTINUED | OUTPATIENT
Start: 2022-01-10 | End: 2022-01-10 | Stop reason: HOSPADM

## 2022-01-10 RX ORDER — HYDROMORPHONE HYDROCHLORIDE 1 MG/ML
0.5 INJECTION, SOLUTION INTRAMUSCULAR; INTRAVENOUS; SUBCUTANEOUS
Status: DISCONTINUED | OUTPATIENT
Start: 2022-01-10 | End: 2022-01-10

## 2022-01-10 RX ADMIN — LIDOCAINE HYDROCHLORIDE 100 MG: 20 INJECTION, SOLUTION EPIDURAL; INFILTRATION; INTRACAUDAL; PERINEURAL at 14:32

## 2022-01-10 RX ADMIN — PHENYLEPHRINE HYDROCHLORIDE 80 MCG/MIN: 10 INJECTION INTRAVENOUS at 15:15

## 2022-01-10 RX ADMIN — PROPOFOL 150 MG: 10 INJECTION, EMULSION INTRAVENOUS at 14:32

## 2022-01-10 RX ADMIN — FENTANYL CITRATE 50 MCG: 50 INJECTION, SOLUTION INTRAMUSCULAR; INTRAVENOUS at 14:45

## 2022-01-10 RX ADMIN — FENTANYL CITRATE 25 MCG: 0.05 INJECTION, SOLUTION INTRAMUSCULAR; INTRAVENOUS at 19:00

## 2022-01-10 RX ADMIN — SODIUM CHLORIDE, POTASSIUM CHLORIDE, SODIUM LACTATE AND CALCIUM CHLORIDE: 600; 310; 30; 20 INJECTION, SOLUTION INTRAVENOUS at 14:23

## 2022-01-10 RX ADMIN — SODIUM CHLORIDE 125 ML/HR: 9 INJECTION, SOLUTION INTRAVENOUS at 18:46

## 2022-01-10 RX ADMIN — ACETAMINOPHEN 1000 MG: 500 TABLET ORAL at 10:32

## 2022-01-10 RX ADMIN — HYDROMORPHONE HYDROCHLORIDE 0.2 MG: 1 INJECTION, SOLUTION INTRAMUSCULAR; INTRAVENOUS; SUBCUTANEOUS at 17:44

## 2022-01-10 RX ADMIN — FENTANYL CITRATE 50 MCG: 50 INJECTION, SOLUTION INTRAMUSCULAR; INTRAVENOUS at 17:23

## 2022-01-10 RX ADMIN — ROCURONIUM BROMIDE 20 MG: 50 INJECTION, SOLUTION INTRAVENOUS at 16:47

## 2022-01-10 RX ADMIN — PIPERACILLIN SODIUM AND TAZOBACTAM SODIUM 3.38 G: 3; .375 INJECTION, POWDER, LYOPHILIZED, FOR SOLUTION INTRAVENOUS at 20:01

## 2022-01-10 RX ADMIN — FENTANYL CITRATE 50 MCG: 50 INJECTION, SOLUTION INTRAMUSCULAR; INTRAVENOUS at 15:50

## 2022-01-10 RX ADMIN — SUGAMMADEX 200 MG: 100 INJECTION, SOLUTION INTRAVENOUS at 17:14

## 2022-01-10 RX ADMIN — ONDANSETRON 4 MG: 2 INJECTION INTRAMUSCULAR; INTRAVENOUS at 22:48

## 2022-01-10 RX ADMIN — FENTANYL CITRATE 25 MCG: 0.05 INJECTION, SOLUTION INTRAMUSCULAR; INTRAVENOUS at 18:05

## 2022-01-10 RX ADMIN — ROCURONIUM BROMIDE 20 MG: 50 INJECTION, SOLUTION INTRAVENOUS at 15:44

## 2022-01-10 RX ADMIN — PIPERACILLIN SODIUM AND TAZOBACTAM SODIUM 3.38 G: 3; .375 INJECTION, POWDER, LYOPHILIZED, FOR SOLUTION INTRAVENOUS at 14:56

## 2022-01-10 RX ADMIN — MIDAZOLAM HYDROCHLORIDE 2 MG: 2 INJECTION, SOLUTION INTRAMUSCULAR; INTRAVENOUS at 14:23

## 2022-01-10 RX ADMIN — Medication 80 MCG/MIN: at 15:22

## 2022-01-10 RX ADMIN — ONDANSETRON 4 MG: 2 INJECTION INTRAMUSCULAR; INTRAVENOUS at 17:55

## 2022-01-10 RX ADMIN — HYDROMORPHONE HYDROCHLORIDE 1 MG: 1 INJECTION, SOLUTION INTRAMUSCULAR; INTRAVENOUS; SUBCUTANEOUS at 20:02

## 2022-01-10 RX ADMIN — ONDANSETRON 4 MG: 2 INJECTION INTRAMUSCULAR; INTRAVENOUS at 17:15

## 2022-01-10 RX ADMIN — FENTANYL CITRATE 100 MCG: 50 INJECTION, SOLUTION INTRAMUSCULAR; INTRAVENOUS at 14:32

## 2022-01-10 RX ADMIN — OXCARBAZEPINE 300 MG: 150 TABLET, FILM COATED ORAL at 22:28

## 2022-01-10 RX ADMIN — FENTANYL CITRATE 25 MCG: 50 INJECTION, SOLUTION INTRAMUSCULAR; INTRAVENOUS at 19:00

## 2022-01-10 RX ADMIN — BUSPIRONE HYDROCHLORIDE 7.5 MG: 5 TABLET ORAL at 21:03

## 2022-01-10 RX ADMIN — DEXAMETHASONE SODIUM PHOSPHATE 4 MG: 4 INJECTION, SOLUTION INTRA-ARTICULAR; INTRALESIONAL; INTRAMUSCULAR; INTRAVENOUS; SOFT TISSUE at 14:35

## 2022-01-10 RX ADMIN — SODIUM CHLORIDE: 9 INJECTION, SOLUTION INTRAVENOUS at 14:24

## 2022-01-10 RX ADMIN — SODIUM CHLORIDE, POTASSIUM CHLORIDE, SODIUM LACTATE AND CALCIUM CHLORIDE 50 ML/HR: 600; 310; 30; 20 INJECTION, SOLUTION INTRAVENOUS at 10:26

## 2022-01-10 RX ADMIN — HYDROMORPHONE HYDROCHLORIDE 0.2 MG: 1 INJECTION, SOLUTION INTRAMUSCULAR; INTRAVENOUS; SUBCUTANEOUS at 18:00

## 2022-01-10 RX ADMIN — PHENYLEPHRINE HYDROCHLORIDE 200 MCG: 10 INJECTION INTRAVENOUS at 15:23

## 2022-01-10 RX ADMIN — FENTANYL CITRATE 50 MCG: 50 INJECTION, SOLUTION INTRAMUSCULAR; INTRAVENOUS at 17:36

## 2022-01-10 RX ADMIN — SODIUM CHLORIDE, PRESERVATIVE FREE 20 MG: 5 INJECTION INTRAVENOUS at 21:02

## 2022-01-10 RX ADMIN — FENTANYL CITRATE 25 MCG: 0.05 INJECTION, SOLUTION INTRAMUSCULAR; INTRAVENOUS at 18:18

## 2022-01-10 RX ADMIN — ROCURONIUM BROMIDE 50 MG: 50 INJECTION, SOLUTION INTRAVENOUS at 14:32

## 2022-01-10 NOTE — PROGRESS NOTES
Four eyes skin assessment completed with AVERY Drake. No skin breakdown noted, but unable to check posterior side. Pt. States, \"stop\" because in pain.

## 2022-01-10 NOTE — ANESTHESIA POSTPROCEDURE EVALUATION
Procedure(s):  EXPLORATORY LAPAROTOMY, SIGMOID COLECTOMY, LOW ANTERIOR RESECTION, APPENDECTOMY, LIVER METASTECTOMY, CYSTOURETHROSCOPY, BILATERAL URETERAL STENT PLACEMENT;  CYSTOURETHROSCOPY, BILATERAL URETERAL STENT PLACEMENT. general    Anesthesia Post Evaluation        Patient location during evaluation: PACU  Patient participation: complete - patient participated  Level of consciousness: awake  Pain score: 0  Pain management: adequate  Airway patency: patent  Anesthetic complications: no  Cardiovascular status: hemodynamically stable  Respiratory status: unassisted and spontaneous ventilation  Hydration status: acceptable  Post anesthesia nausea and vomiting:  controlled  Final Post Anesthesia Temperature Assessment:  Normothermia (36.0-37.5 degrees C)      INITIAL Post-op Vital signs: No vitals data found for the desired time range.

## 2022-01-10 NOTE — OP NOTES
Operative Note    Patient: Shanell Ashford  YOB: 1965  MRN: [id number]    Date of Procedure: 1/10/2022     Pre-Op Diagnosis: SIGMOID COLON CANCER    Post-Op Diagnosis: Same as preoperative diagnosis. ,  Liver metastases    Procedure(s):  EXPLORATORY LAPAROTOMY, SIGMOID COLECTOMY, LOW ANTERIOR RESECTION, APPENDECTOMY, LIVER METASTECTOMY, CYSTOURETHROSCOPY, BILATERAL URETERAL STENT PLACEMENT;  CYSTOURETHROSCOPY, BILATERAL URETERAL STENT PLACEMENT    Surgeon(s):  MD Narinder Paige MD Deters, Luella Scudder, DO    Surgical Assistant: Alfredo    Anesthesia: General     Estimated Blood Loss (mL):  142IF    Complications: None    Specimens:   ID Type Source Tests Collected by Time Destination   1 : Sigmoid colon Preservative Sigmoid  Narinder Lorenzana MD 1/10/2022 1626 Pathology   2 : Appendix Preservative Appendix  Narinder Lorenzana MD 1/10/2022 1627 Pathology   3 : Liver Mets Preservative Liver  Narinder Lorenzana MD 1/10/2022 1627 Pathology        Implants: * No implants in log *    Drains:   [REMOVED] Orogastric Tube 01/10/22 (Removed)       Findings: Large bulky tumor of the sigmoid colon with associated bulky mesenteric lymphadenopathy, extra mesenteric tumor deposits on the mesentery of the sigmoid colon, liver metastases 1 large metastatic deposit in the left lateral segment    Detailed Description of Procedure: The patient was brought to the operating room and positioned on the OR table in the supine position. Find induction of general anesthesia patient's placed in lithotomy position. Dr. Shahana Capellan from urology did a cystoscopy with bilateral ureteric stent placement. Following the conclusion of his portion of the case which we dictated as a separate note by him the abdomen was prepped and draped in the standard sterile fashion. A midline incision was made the scalpel and taken down through subtenons tissues electrocautery.   The fascia was sharply incised abdominal cavity entered and incisional prolongs entire length. The Bookwalter retractor was then positioned and appropriate abdominal wall retractor was placed. General laparotomy was then performed. A large bulky tumor of the sigmoid colon was easily palpated. There is an extra mesenteric deposits of tumor on the mesentery of the sigmoid colon. The root of the mesentery of the small bowel was adherent down to the sigmoid however this was easily peeled off and do not appear to be invaded by tumor. It appears to be more of inflammatory reaction. The mesentery of the appendix was also noted to be adherent to the sigmoid colon mass and this appeared to be more involved with tumor and therefore an appendectomy was performed. The mesentery was clamped and divided with the LigaSure device and the appendix divided at the base with an Endo NIYA stapler. The appendix was passed off the specimen. A large metastatic nodule could be felt in the left lateral liver segment. There was 2 small nodules in close proximity. The sigmoid colon was mobilized by dividing the lateral peritoneal reflection and going up along the white line of Toldt to mobilize the descending colon. The ileoanal colic ligament was also partially divided to fully mobilize the colon to allow anastomosis. We divided the peritoneum laterally and medially down to the point of the rectum the chosen for division of the bowel. At this point the left ureter was identified palpated and carefully preserved. The NIYA stapler was used to transect the bowel proximally and appropriate site. The LigaSure device was then used to take the mesentery of the sigmoid colon. The division went down directly down to the EDSON which was clamped and divided at its origin divided and suture-ligated. A window was then created between the rectum and the mesorectum and appropriate site distally and the [address] 60 stapler used to transect the rectum.   LigaSure device was used to divide the mesorectum of the divided portion of the rectum and the specimen consisted of sigmoid colon proximal rectum was passed off the table. After extremity case hemostasis we turned our attention to the liver. Using the electrocautery, LigaSure and argon beam metastectomy was performed incorporating the metastatic lesion of the left lateral segment. There is 2 smaller satellite lesions or cauterized with the argon beam.  After ensuring hemostasis Surgicel was placed with a sponge. We then turned our attention to creating an anastomosis between the proximal and distal bowel. The proximal bowel staple line was removed and a 2-0 PDS pursestring placed. The anvil of a 29 circular stapler was placed within the pursestring and secured in place. After adequately mobilizing the rectum to allow the stapler to pass this 29 circular stapler was passed and a end-to-end anastomosis fashion to the proximal and distal bowel. 2 complete donuts were noted at the inclusion. The bowel was then insufflated under irrigation no leak noted. At this point once again inspected liver meniscectomy site. After she meticulous hemostasis the bowel was returned to its anatomic position all abdominal wall retractors were removed and the fascia closed with running #1 PDS suture. Skin incision was closed with skin clips. Dressings were applied the patient returned to supine position, awakened, extubated, taken recovery in stable condition. All sponge counts were correct at the close of the case x2.   Electronically Signed by Ashley Benton MD on 1/10/2022 at 5:59 PM

## 2022-01-10 NOTE — ANESTHESIA PREPROCEDURE EVALUATION
Relevant Problems   No relevant active problems       Anesthetic History   No history of anesthetic complications            Review of Systems / Medical History  Patient summary reviewed, nursing notes reviewed and pertinent labs reviewed    Pulmonary  Within defined limits                 Neuro/Psych   Within defined limits           Cardiovascular                  Exercise tolerance: >4 METS     GI/Hepatic/Renal     GERD      Liver disease     Endo/Other        Cancer and anemia     Other Findings   Comments: Hb 9.7         Physical Exam    Airway  Mallampati: II  TM Distance: > 6 cm  Neck ROM: normal range of motion   Mouth opening: Normal     Cardiovascular    Rhythm: regular           Dental  No notable dental hx       Pulmonary  Breath sounds clear to auscultation               Abdominal         Other Findings            Anesthetic Plan    ASA: 3  Anesthesia type: general          Induction: Intravenous  Anesthetic plan and risks discussed with: Patient

## 2022-01-10 NOTE — INTERVAL H&P NOTE
Update History & Physical    The Patient's History and Physical of 12/15/2021 was reviewed with the patient and I examined the patient. Her PET CT results were in and demonstrated a metastatic lesion in the left lateral liver lobe, this was discussed with the patient explained to her that I will try to do a wedge resection of her liver metastasis at the same time as her colon resection today. I reviewed the risk and benefits of this procedure including the risk of infection, biliary leak, bleeding. The patient is amenable to having both the sigmoid colectomy as discussed prior and the wedge resection. There was a change in plan as noted above. .  The surgical site was confirmed by the patient and me. Plan:  The risk, benefits, expected outcome, and alternative to the recommended procedure have been discussed with the patient. Patient understands and wants to proceed with the procedure.     Electronically signed by Suni Figueroa MD on 1/10/2022 at 10:34 AM

## 2022-01-11 LAB
ALBUMIN SERPL-MCNC: 1.5 G/DL (ref 3.5–5)
ALBUMIN SERPL-MCNC: 2 G/DL (ref 3.5–5)
ALBUMIN/GLOB SERPL: 0.4 {RATIO} (ref 1.1–2.2)
ALBUMIN/GLOB SERPL: 0.5 {RATIO} (ref 1.1–2.2)
ALP SERPL-CCNC: 72 U/L (ref 45–117)
ALP SERPL-CCNC: 93 U/L (ref 45–117)
ALT SERPL-CCNC: 25 U/L (ref 12–78)
ALT SERPL-CCNC: 31 U/L (ref 12–78)
ANION GAP SERPL CALC-SCNC: 15 MMOL/L (ref 5–15)
ANION GAP SERPL CALC-SCNC: 4 MMOL/L (ref 5–15)
AST SERPL W P-5'-P-CCNC: 32 U/L (ref 15–37)
AST SERPL W P-5'-P-CCNC: 38 U/L (ref 15–37)
BASOPHILS # BLD: 0 K/UL (ref 0–0.1)
BASOPHILS # BLD: 0 K/UL (ref 0–0.1)
BASOPHILS NFR BLD: 0 % (ref 0–1)
BASOPHILS NFR BLD: 0 % (ref 0–1)
BILIRUB SERPL-MCNC: 0.3 MG/DL (ref 0.2–1)
BILIRUB SERPL-MCNC: 0.4 MG/DL (ref 0.2–1)
BUN SERPL-MCNC: 4 MG/DL (ref 6–20)
BUN SERPL-MCNC: 5 MG/DL (ref 6–20)
BUN/CREAT SERPL: 10 (ref 12–20)
BUN/CREAT SERPL: 8 (ref 12–20)
CA-I BLD-MCNC: 6.4 MG/DL (ref 8.5–10.1)
CA-I BLD-MCNC: 8.4 MG/DL (ref 8.5–10.1)
CHLORIDE SERPL-SCNC: 109 MMOL/L (ref 97–108)
CHLORIDE SERPL-SCNC: 115 MMOL/L (ref 97–108)
CO2 SERPL-SCNC: 19 MMOL/L (ref 21–32)
CO2 SERPL-SCNC: 25 MMOL/L (ref 21–32)
CREAT SERPL-MCNC: 0.4 MG/DL (ref 0.55–1.02)
CREAT SERPL-MCNC: 0.59 MG/DL (ref 0.55–1.02)
DIFFERENTIAL METHOD BLD: ABNORMAL
DIFFERENTIAL METHOD BLD: ABNORMAL
EOSINOPHIL # BLD: 0 K/UL (ref 0–0.4)
EOSINOPHIL # BLD: 0 K/UL (ref 0–0.4)
EOSINOPHIL NFR BLD: 0 % (ref 0–7)
EOSINOPHIL NFR BLD: 0 % (ref 0–7)
ERYTHROCYTE [DISTWIDTH] IN BLOOD BY AUTOMATED COUNT: 31.3 % (ref 11.5–14.5)
ERYTHROCYTE [DISTWIDTH] IN BLOOD BY AUTOMATED COUNT: 31.4 % (ref 11.5–14.5)
GLOBULIN SER CALC-MCNC: 3.9 G/DL (ref 2–4)
GLOBULIN SER CALC-MCNC: 4.2 G/DL (ref 2–4)
GLUCOSE SERPL-MCNC: 122 MG/DL (ref 65–100)
GLUCOSE SERPL-MCNC: 124 MG/DL (ref 65–100)
HCT VFR BLD AUTO: 24.5 % (ref 35–47)
HCT VFR BLD AUTO: 31 % (ref 35–47)
HGB BLD-MCNC: 7.1 G/DL (ref 11.5–16)
HGB BLD-MCNC: 9 G/DL (ref 11.5–16)
IMM GRANULOCYTES # BLD AUTO: 0.1 K/UL (ref 0–0.04)
IMM GRANULOCYTES # BLD AUTO: 0.1 K/UL (ref 0–0.04)
IMM GRANULOCYTES NFR BLD AUTO: 1 % (ref 0–0.5)
IMM GRANULOCYTES NFR BLD AUTO: 1 % (ref 0–0.5)
LYMPHOCYTES # BLD: 1.1 K/UL (ref 0.8–3.5)
LYMPHOCYTES # BLD: 1.7 K/UL (ref 0.8–3.5)
LYMPHOCYTES NFR BLD: 11 % (ref 12–49)
LYMPHOCYTES NFR BLD: 8 % (ref 12–49)
MCH RBC QN AUTO: 19.9 PG (ref 26–34)
MCH RBC QN AUTO: 19.9 PG (ref 26–34)
MCHC RBC AUTO-ENTMCNC: 29 G/DL (ref 30–36.5)
MCHC RBC AUTO-ENTMCNC: 29 G/DL (ref 30–36.5)
MCV RBC AUTO: 68.4 FL (ref 80–99)
MCV RBC AUTO: 68.8 FL (ref 80–99)
MONOCYTES # BLD: 0.7 K/UL (ref 0–1)
MONOCYTES # BLD: 0.9 K/UL (ref 0–1)
MONOCYTES NFR BLD: 5 % (ref 5–13)
MONOCYTES NFR BLD: 6 % (ref 5–13)
NEUTS SEG # BLD: 12 K/UL (ref 1.8–8)
NEUTS SEG # BLD: 12.3 K/UL (ref 1.8–8)
NEUTS SEG NFR BLD: 82 % (ref 32–75)
NEUTS SEG NFR BLD: 86 % (ref 32–75)
NRBC # BLD: 0 K/UL (ref 0–0.01)
NRBC # BLD: 0 K/UL (ref 0–0.01)
NRBC BLD-RTO: 0 PER 100 WBC
NRBC BLD-RTO: 0 PER 100 WBC
PLATELET # BLD AUTO: 513 K/UL (ref 150–400)
PLATELET # BLD AUTO: 648 K/UL (ref 150–400)
PMV BLD AUTO: 8.2 FL (ref 8.9–12.9)
PMV BLD AUTO: 8.5 FL (ref 8.9–12.9)
POTASSIUM SERPL-SCNC: 2.8 MMOL/L (ref 3.5–5.1)
POTASSIUM SERPL-SCNC: 4 MMOL/L (ref 3.5–5.1)
PROT SERPL-MCNC: 5.7 G/DL (ref 6.4–8.2)
PROT SERPL-MCNC: 5.9 G/DL (ref 6.4–8.2)
RBC # BLD AUTO: 3.56 M/UL (ref 3.8–5.2)
RBC # BLD AUTO: 4.53 M/UL (ref 3.8–5.2)
SODIUM SERPL-SCNC: 138 MMOL/L (ref 136–145)
SODIUM SERPL-SCNC: 149 MMOL/L (ref 136–145)
WBC # BLD AUTO: 13.8 K/UL (ref 3.6–11)
WBC # BLD AUTO: 15 K/UL (ref 3.6–11)

## 2022-01-11 PROCEDURE — 85025 COMPLETE CBC W/AUTO DIFF WBC: CPT

## 2022-01-11 PROCEDURE — 74011000258 HC RX REV CODE- 258: Performed by: COLON & RECTAL SURGERY

## 2022-01-11 PROCEDURE — 74011250637 HC RX REV CODE- 250/637: Performed by: COLON & RECTAL SURGERY

## 2022-01-11 PROCEDURE — 36415 COLL VENOUS BLD VENIPUNCTURE: CPT

## 2022-01-11 PROCEDURE — 99232 SBSQ HOSP IP/OBS MODERATE 35: CPT | Performed by: UROLOGY

## 2022-01-11 PROCEDURE — 74011000250 HC RX REV CODE- 250: Performed by: COLON & RECTAL SURGERY

## 2022-01-11 PROCEDURE — 80053 COMPREHEN METABOLIC PANEL: CPT

## 2022-01-11 PROCEDURE — 99024 POSTOP FOLLOW-UP VISIT: CPT | Performed by: COLON & RECTAL SURGERY

## 2022-01-11 PROCEDURE — 74011250636 HC RX REV CODE- 250/636: Performed by: COLON & RECTAL SURGERY

## 2022-01-11 PROCEDURE — 65270000029 HC RM PRIVATE

## 2022-01-11 RX ORDER — HYDROMORPHONE HYDROCHLORIDE 2 MG/ML
1.5 INJECTION, SOLUTION INTRAMUSCULAR; INTRAVENOUS; SUBCUTANEOUS
Status: DISPENSED | OUTPATIENT
Start: 2022-01-11 | End: 2022-01-14

## 2022-01-11 RX ORDER — OXYCODONE HYDROCHLORIDE 5 MG/1
5 TABLET ORAL
Status: DISCONTINUED | OUTPATIENT
Start: 2022-01-11 | End: 2022-01-21 | Stop reason: HOSPADM

## 2022-01-11 RX ORDER — CYCLOBENZAPRINE HCL 10 MG
10 TABLET ORAL
Status: DISCONTINUED | OUTPATIENT
Start: 2022-01-11 | End: 2022-01-21 | Stop reason: HOSPADM

## 2022-01-11 RX ORDER — HYDROMORPHONE HYDROCHLORIDE 1 MG/ML
1 INJECTION, SOLUTION INTRAMUSCULAR; INTRAVENOUS; SUBCUTANEOUS
Status: DISPENSED | OUTPATIENT
Start: 2022-01-11 | End: 2022-01-14

## 2022-01-11 RX ADMIN — OXYCODONE HYDROCHLORIDE 5 MG: 5 TABLET ORAL at 18:14

## 2022-01-11 RX ADMIN — OXCARBAZEPINE 300 MG: 150 TABLET, FILM COATED ORAL at 21:13

## 2022-01-11 RX ADMIN — HYDROMORPHONE HYDROCHLORIDE 1.5 MG: 2 INJECTION INTRAMUSCULAR; INTRAVENOUS; SUBCUTANEOUS at 21:14

## 2022-01-11 RX ADMIN — BUPROPION HYDROCHLORIDE 150 MG: 150 TABLET, FILM COATED, EXTENDED RELEASE ORAL at 08:19

## 2022-01-11 RX ADMIN — PIPERACILLIN SODIUM AND TAZOBACTAM SODIUM 3.38 G: 3; .375 INJECTION, POWDER, LYOPHILIZED, FOR SOLUTION INTRAVENOUS at 00:08

## 2022-01-11 RX ADMIN — CYCLOBENZAPRINE 10 MG: 10 TABLET, FILM COATED ORAL at 11:30

## 2022-01-11 RX ADMIN — SODIUM CHLORIDE 125 ML/HR: 9 INJECTION, SOLUTION INTRAVENOUS at 18:22

## 2022-01-11 RX ADMIN — BUSPIRONE HYDROCHLORIDE 7.5 MG: 5 TABLET ORAL at 08:19

## 2022-01-11 RX ADMIN — SODIUM CHLORIDE, PRESERVATIVE FREE 20 MG: 5 INJECTION INTRAVENOUS at 08:19

## 2022-01-11 RX ADMIN — SODIUM CHLORIDE, PRESERVATIVE FREE 20 MG: 5 INJECTION INTRAVENOUS at 21:13

## 2022-01-11 RX ADMIN — HYDROMORPHONE HYDROCHLORIDE 1 MG: 1 INJECTION, SOLUTION INTRAMUSCULAR; INTRAVENOUS; SUBCUTANEOUS at 04:34

## 2022-01-11 RX ADMIN — HYDROMORPHONE HYDROCHLORIDE 1.5 MG: 2 INJECTION INTRAMUSCULAR; INTRAVENOUS; SUBCUTANEOUS at 12:24

## 2022-01-11 RX ADMIN — HYDROMORPHONE HYDROCHLORIDE 1 MG: 1 INJECTION, SOLUTION INTRAMUSCULAR; INTRAVENOUS; SUBCUTANEOUS at 00:07

## 2022-01-11 RX ADMIN — HYDROMORPHONE HYDROCHLORIDE 1.5 MG: 2 INJECTION INTRAMUSCULAR; INTRAVENOUS; SUBCUTANEOUS at 16:25

## 2022-01-11 RX ADMIN — PIPERACILLIN SODIUM AND TAZOBACTAM SODIUM 3.38 G: 3; .375 INJECTION, POWDER, LYOPHILIZED, FOR SOLUTION INTRAVENOUS at 11:30

## 2022-01-11 RX ADMIN — BUSPIRONE HYDROCHLORIDE 7.5 MG: 5 TABLET ORAL at 21:13

## 2022-01-11 RX ADMIN — PIPERACILLIN SODIUM AND TAZOBACTAM SODIUM 3.38 G: 3; .375 INJECTION, POWDER, LYOPHILIZED, FOR SOLUTION INTRAVENOUS at 06:19

## 2022-01-11 RX ADMIN — OXYCODONE HYDROCHLORIDE 5 MG: 5 TABLET ORAL at 23:19

## 2022-01-11 RX ADMIN — QUETIAPINE FUMARATE 100 MG: 100 TABLET ORAL at 08:19

## 2022-01-11 RX ADMIN — PIPERACILLIN SODIUM AND TAZOBACTAM SODIUM 3.38 G: 3; .375 INJECTION, POWDER, LYOPHILIZED, FOR SOLUTION INTRAVENOUS at 18:12

## 2022-01-11 RX ADMIN — SODIUM CHLORIDE 125 ML/HR: 9 INJECTION, SOLUTION INTRAVENOUS at 04:42

## 2022-01-11 RX ADMIN — HYDROMORPHONE HYDROCHLORIDE 1 MG: 1 INJECTION, SOLUTION INTRAMUSCULAR; INTRAVENOUS; SUBCUTANEOUS at 08:19

## 2022-01-11 NOTE — PROGRESS NOTES
Progress Note    Patient: Alejandra Sargent MRN: [id number]  SSN: [id number]    YOB: 1965  Age: 49 y.o. Sex: female      Admit Date: 1/10/2022    LOS: 1 day     Subjective:   Postoperative day 1  Patient seen in bed  Chief complaint pain    Objective:     Vitals:    01/10/22 2257 01/11/22 0131 01/11/22 0426 01/11/22 0750   BP: 112/88 (!) 145/95 (!) 166/95 (!) 158/91   Pulse: 69 78 73 77   Resp: 18 18 18 22   Temp: 98.1 °F (36.7 °C) 98.7 °F (37.1 °C) 98.1 °F (36.7 °C) 98.2 °F (36.8 °C)   SpO2: 97% 95% 97% 97%        Intake and Output:  Current Shift: No intake/output data recorded. Last three shifts: 01/09 1901 - 01/11 0700  In: 6660 [P.O.:60; I.V.:6600]  Out: 1400 [Urine:1300]    Review of Systems:  ROS     Physical Exam:   Abdomen is soft, appropriately tender, nondistended, midline incision is intact with some mild staining at the lower aspect    Lab/Data Review:  Recent Results (from the past 24 hour(s))   CBC WITH AUTOMATED DIFF    Collection Time: 01/10/22  1:45 PM   Result Value Ref Range    WBC 10.4 3.6 - 11.0 K/uL    RBC 4.89 3.80 - 5.20 M/uL    HGB 9.8 (L) 11.5 - 16.0 g/dL    HCT 33.1 (L) 35.0 - 47.0 %    MCV 67.7 (L) 80.0 - 99.0 FL    MCH 20.0 (L) 26.0 - 34.0 PG    MCHC 29.6 (L) 30.0 - 36.5 g/dL    RDW 31.7 (H) 11.5 - 14.5 %    PLATELET 158 (H) 872 - 400 K/uL    MPV 8.5 (L) 8.9 - 12.9 FL    NRBC 0.0 0.0  WBC    ABSOLUTE NRBC 0.00 0.00 - 0.01 K/uL    NEUTROPHILS 68 32 - 75 %    LYMPHOCYTES 20 12 - 49 %    MONOCYTES 8 5 - 13 %    EOSINOPHILS 3 0 - 7 %    BASOPHILS 1 0 - 1 %    IMMATURE GRANULOCYTES 0 0 - 0.5 %    ABS. NEUTROPHILS 7.0 1.8 - 8.0 K/UL    ABS. LYMPHOCYTES 2.1 0.8 - 3.5 K/UL    ABS. MONOCYTES 0.8 0.0 - 1.0 K/UL    ABS. EOSINOPHILS 0.4 0.0 - 0.4 K/UL    ABS. BASOPHILS 0.1 0.0 - 0.1 K/UL    ABS. IMM.  GRANS. 0.0 0.00 - 0.04 K/UL    DF AUTOMATED     METABOLIC PANEL, BASIC    Collection Time: 01/10/22  1:45 PM   Result Value Ref Range    Sodium 140 136 - 145 mmol/L Potassium 3.2 (L) 3.5 - 5.1 mmol/L    Chloride 108 97 - 108 mmol/L    CO2 26 21 - 32 mmol/L    Anion gap 6 5 - 15 mmol/L    Glucose 92 65 - 100 mg/dL    BUN 5 (L) 6 - 20 mg/dL    Creatinine 0.61 0.55 - 1.02 mg/dL    BUN/Creatinine ratio 8 (L) 12 - 20      GFR est AA >60 >60 ml/min/1.73m2    GFR est non-AA >60 >60 ml/min/1.73m2    Calcium 8.4 (L) 8.5 - 10.1 mg/dL   PROTHROMBIN TIME + INR    Collection Time: 01/10/22  1:45 PM   Result Value Ref Range    Prothrombin time 14.2 11.9 - 14.7 sec    INR 1.1 0.9 - 1.1     PTT    Collection Time: 01/10/22  1:45 PM   Result Value Ref Range    aPTT 29.2 21.2 - 34.1 sec    aPTT, therapeutic range   82 - 109 sec   CBC WITH AUTOMATED DIFF    Collection Time: 01/11/22  6:45 AM   Result Value Ref Range    WBC 13.8 (H) 3.6 - 11.0 K/uL    RBC 3.56 (L) 3.80 - 5.20 M/uL    HGB 7.1 (L) 11.5 - 16.0 g/dL    HCT 24.5 (L) 35.0 - 47.0 %    MCV 68.8 (L) 80.0 - 99.0 FL    MCH 19.9 (L) 26.0 - 34.0 PG    MCHC 29.0 (L) 30.0 - 36.5 g/dL    RDW 31.3 (H) 11.5 - 14.5 %    PLATELET 104 (H) 918 - 400 K/uL    MPV 8.5 (L) 8.9 - 12.9 FL    NRBC 0.0 0.0  WBC    ABSOLUTE NRBC 0.00 0.00 - 0.01 K/uL    NEUTROPHILS 86 (H) 32 - 75 %    LYMPHOCYTES 8 (L) 12 - 49 %    MONOCYTES 5 5 - 13 %    EOSINOPHILS 0 0 - 7 %    BASOPHILS 0 0 - 1 %    IMMATURE GRANULOCYTES 1 (H) 0 - 0.5 %    ABS. NEUTROPHILS 12.0 (H) 1.8 - 8.0 K/UL    ABS. LYMPHOCYTES 1.1 0.8 - 3.5 K/UL    ABS. MONOCYTES 0.7 0.0 - 1.0 K/UL    ABS. EOSINOPHILS 0.0 0.0 - 0.4 K/UL    ABS. BASOPHILS 0.0 0.0 - 0.1 K/UL    ABS. IMM.  GRANS. 0.1 (H) 0.00 - 0.04 K/UL    DF AUTOMATED     METABOLIC PANEL, COMPREHENSIVE    Collection Time: 01/11/22  6:45 AM   Result Value Ref Range    Sodium 149 (H) 136 - 145 mmol/L    Potassium 2.8 (L) 3.5 - 5.1 mmol/L    Chloride 115 (H) 97 - 108 mmol/L    CO2 19 (L) 21 - 32 mmol/L    Anion gap 15 5 - 15 mmol/L    Glucose 122 (H) 65 - 100 mg/dL    BUN 4 (L) 6 - 20 mg/dL    Creatinine 0.40 (L) 0.55 - 1.02 mg/dL    BUN/Creatinine ratio 10 (L) 12 - 20      GFR est AA >60 >60 ml/min/1.73m2    GFR est non-AA >60 >60 ml/min/1.73m2    Calcium 6.4 (LL) 8.5 - 10.1 mg/dL    Bilirubin, total 0.3 0.2 - 1.0 mg/dL    AST (SGOT) 32 15 - 37 U/L    ALT (SGPT) 25 12 - 78 U/L    Alk.  phosphatase 72 45 - 117 U/L    Protein, total 5.7 (L) 6.4 - 8.2 g/dL    Albumin 1.5 (L) 3.5 - 5.0 g/dL    Globulin 4.2 (H) 2.0 - 4.0 g/dL    A-G Ratio 0.4 (L) 1.1 - 2.2            Assessment:     Active Problems:    Cancer of sigmoid colon ([facility] (1/10/2022)      Metastatic colon cancer to liver ([facility] (1/10/2022)        Plan:   Repeat labs stat, labs were drawn from the same arm as IV  She Angst blood in [place], contacted Dr. Ronnie Clemente who will come to evaluate  Continue n.p.o., awaiting return of bowel function  Discontinue antibiotics after today  Continue sequential compression devices, will hold off on Lovenox for DVT prophylaxis given blood in [place] and drop in hemoglobin    Signed By: Jessie Mari MD     January 11, 2022
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
## 2022-01-11 NOTE — OP NOTES
Procedure note  Preop diagnosis was colon cancer metastatic to the liver   postop diagnosis same  Procedure cystoscopy bilateral ureteral catheter placements  Indication= patient has colon cancer and Dr. Laura Acevedo he would like to have stents placed prior to help prevent ureteral injury. She and her family are aware of the risk of bleeding infection injury to the ureter bladder pulmonary embolus and death. Patient still could have injury to the ureters they have no questions  Surgeon- Tabitha Breach-  Complication-without  Procedure-patient was prepped and draped usual sterile fashion undergoing general anesthesia placed in the lithotomy position. The scope was 21 scope. Hernandez tumors or any lesions of note were identified in the bladder. Bilateral whistle-tip ureteral catheters were placed 5 Kinyarwanda of the left and right ureter gently. There were draining clear urine. I then remove the cystoscope I attached the ureteral catheters to the [place] using 2-14 Angiocath's.   Secured the ureteral catheters of the [place] with a tie placed a drainage bag and turned the patient over to Dr. Foote

## 2022-01-11 NOTE — PROGRESS NOTES
1830Madison Schumacher S.O. notified pt. Finished with surgery and will be going to room 219 soon, can meet in room. 1840: Pt. Transferred to room 219 via bed in stable condition with belongings. Bedside report given, SBAR reviewed, sites viewed. S.O. to room. Dr. Pranav Linares notified stents are not in patient and some urine is draining out of barriga tubing. States is okay, holes in barriga from where stents were, okay to wrap with peripad. Primary nurse aware and barriga wrapped with chux.

## 2022-01-11 NOTE — CONSULTS
UROLOGY CONSULT NOTE  [phone]        Patient: Aaliyah Mckinley MRN: [id number]  SSN: [id number]    YOB: 1965  Age: 49 y.o. Sex: female      REFERRING PROVIDER: Yisel Gillis  Subjective:      Aaliyah Mckinley is a 49 y.o. female who is being seen in urologic consultation for gross hematuria. The patient is status post cystoscopy double ureteral catheter placements colectomy, low anterior resection appendectomy liver metastectomy. Patient was noted to have gross hematuria this morning clearing up little bit this afternoon. Patient has a history of hematuria she is aware of in the past patient is not presently on anticoagulation therapy that I can find. No injuries were noted in the ureter or bladder return to the procedure. It may be the trauma per ureteral catheters as cause little bit of bleeding urine is clearing. Patient not passing air through her Alfaro.   Patient only complaint today is abdominal pain from the surgery at the incision sites    Past Medical History:   Diagnosis Date    Anemia     Cancer [facility])     sigmoid colon - per Dr. Cesar More order    Depression     GERD (gastroesophageal reflux disease)     Liver spots      Past Surgical History:   Procedure Laterality Date    HX BILATERAL SALPINGECTOMY      HX  SECTION      2    HX COLONOSCOPY  2021          Family History   Adopted: Yes   Problem Relation Age of Onset    Colon Cancer Mother     No Known Problems Father      Social History     Tobacco Use    Smoking status: Current Some Day Smoker     Packs/day: 0.25    Smokeless tobacco: Never Used    Tobacco comment: occasionally    Substance Use Topics    Alcohol use: Yes     Comment: occasionally      Current Facility-Administered Medications   Medication Dose Route Frequency Provider Last Rate Last Admin    HYDROmorphone (DILAUDID) injection 1.5 mg  1.5 mg IntraVENous Q4H PRN Raúl Mckinney MD   1.5 mg at 22 1224    HYDROmorphone (DILAUDID) injection 1 mg  1 mg IntraVENous Q4H PRN Melissa Harmon MD        cyclobenzaprine (FLEXERIL) tablet 10 mg  10 mg Oral TID PRN Melissa Harmon MD   10 mg at 01/11/22 1130    0.9% sodium chloride infusion  125 mL/hr IntraVENous CONTINUOUS Melissa Harmon  mL/hr at 01/11/22 0442 125 mL/hr at 01/11/22 0442    ondansetron (ZOFRAN) injection 4 mg  4 mg IntraVENous Q6H PRN Melissa Harmon MD   4 mg at 01/10/22 2248    famotidine (PF) (PEPCID) 20 mg in 0.9% sodium chloride 10 mL injection  20 mg IntraVENous Q12H Melissa Harmon MD   20 mg at 01/11/22 0819    buPROPion XL (WELLBUTRIN XL) tablet 150 mg  150 mg Oral DAILY Melissa Harmon MD   150 mg at 01/11/22 0819    busPIRone (BUSPAR) tablet 7.5 mg  7.5 mg Oral BID Melissa Harmon MD   7.5 mg at 01/11/22 0819    OXcarbazepine (TRILEPTAL) tablet 300 mg  300 mg Oral QHS Melissa Harmon MD   300 mg at 01/10/22 2228    QUEtiapine (SEROquel) tablet 100 mg  100 mg Oral DAILY Melissa Harmon MD   100 mg at 01/11/22 0819    piperacillin-tazobactam (ZOSYN) 3.375 g in 0.9% sodium chloride (MBP/ADV) 100 mL MBP  3.375 g IntraVENous Q6H Melissa Harmon  mL/hr at 01/11/22 1130 3.375 g at 01/11/22 1130        No Known Allergies    Review of Systems:  ROS review of systems positive for the abdominal pain she is requiring increasing doses of Dilaudid, gross hematuria this morning and start to clear up now urine in the Alfaro is clear    Objective:     Vitals:    01/10/22 2257 01/11/22 0131 01/11/22 0426 01/11/22 0750   BP: 112/88 (!) 145/95 (!) 166/95 (!) 158/91   Pulse: 69 78 73 77   Resp: 18 18 18 22   Temp: 98.1 °F (36.7 °C) 98.7 °F (37.1 °C) 98.1 °F (36.7 °C) 98.2 °F (36.8 °C)   SpO2: 97% 95% 97% 97%        Recent Labs     01/11/22  1055 01/11/22  0645 01/10/22  1345   WBC 15.0* 13.8* 10.4   HGB 9.0* 7.1* 9.8*   HCT 31.0* 24.5* 33.1*   * 513* 684*     Recent Labs     01/11/22  1055 01/11/22  0645 01/10/22  1345    149* 140 K 4.0 2.8* 3.2*   * 115* 108   CO2 25 19* 26   * 122* 92   BUN 5* 4* 5*   CREA 0.59 0.40* 0.61   CA 8.4* 6.4* 8.4*   ALB 2.0* 1.5*  --    TBILI 0.4 0.3  --    ALT 31 25  --    INR  --   --  1.1     No results for input(s): PH, PCO2, PO2, HCO3, FIO2 in the last 72 hours. 24 Hour Results:  Recent Results (from the past 24 hour(s))   CBC WITH AUTOMATED DIFF    Collection Time: 01/11/22  6:45 AM   Result Value Ref Range    WBC 13.8 (H) 3.6 - 11.0 K/uL    RBC 3.56 (L) 3.80 - 5.20 M/uL    HGB 7.1 (L) 11.5 - 16.0 g/dL    HCT 24.5 (L) 35.0 - 47.0 %    MCV 68.8 (L) 80.0 - 99.0 FL    MCH 19.9 (L) 26.0 - 34.0 PG    MCHC 29.0 (L) 30.0 - 36.5 g/dL    RDW 31.3 (H) 11.5 - 14.5 %    PLATELET 680 (H) 566 - 400 K/uL    MPV 8.5 (L) 8.9 - 12.9 FL    NRBC 0.0 0.0  WBC    ABSOLUTE NRBC 0.00 0.00 - 0.01 K/uL    NEUTROPHILS 86 (H) 32 - 75 %    LYMPHOCYTES 8 (L) 12 - 49 %    MONOCYTES 5 5 - 13 %    EOSINOPHILS 0 0 - 7 %    BASOPHILS 0 0 - 1 %    IMMATURE GRANULOCYTES 1 (H) 0 - 0.5 %    ABS. NEUTROPHILS 12.0 (H) 1.8 - 8.0 K/UL    ABS. LYMPHOCYTES 1.1 0.8 - 3.5 K/UL    ABS. MONOCYTES 0.7 0.0 - 1.0 K/UL    ABS. EOSINOPHILS 0.0 0.0 - 0.4 K/UL    ABS. BASOPHILS 0.0 0.0 - 0.1 K/UL    ABS. IMM. GRANS. 0.1 (H) 0.00 - 0.04 K/UL    DF AUTOMATED     METABOLIC PANEL, COMPREHENSIVE    Collection Time: 01/11/22  6:45 AM   Result Value Ref Range    Sodium 149 (H) 136 - 145 mmol/L    Potassium 2.8 (L) 3.5 - 5.1 mmol/L    Chloride 115 (H) 97 - 108 mmol/L    CO2 19 (L) 21 - 32 mmol/L    Anion gap 15 5 - 15 mmol/L    Glucose 122 (H) 65 - 100 mg/dL    BUN 4 (L) 6 - 20 mg/dL    Creatinine 0.40 (L) 0.55 - 1.02 mg/dL    BUN/Creatinine ratio 10 (L) 12 - 20      GFR est AA >60 >60 ml/min/1.73m2    GFR est non-AA >60 >60 ml/min/1.73m2    Calcium 6.4 (LL) 8.5 - 10.1 mg/dL    Bilirubin, total 0.3 0.2 - 1.0 mg/dL    AST (SGOT) 32 15 - 37 U/L    ALT (SGPT) 25 12 - 78 U/L    Alk.  phosphatase 72 45 - 117 U/L    Protein, total 5.7 (L) 6.4 - 8.2 g/dL    Albumin 1.5 (L) 3.5 - 5.0 g/dL    Globulin 4.2 (H) 2.0 - 4.0 g/dL    A-G Ratio 0.4 (L) 1.1 - 2.2     METABOLIC PANEL, COMPREHENSIVE    Collection Time: 01/11/22 10:55 AM   Result Value Ref Range    Sodium 138 136 - 145 mmol/L    Potassium 4.0 3.5 - 5.1 mmol/L    Chloride 109 (H) 97 - 108 mmol/L    CO2 25 21 - 32 mmol/L    Anion gap 4 (L) 5 - 15 mmol/L    Glucose 124 (H) 65 - 100 mg/dL    BUN 5 (L) 6 - 20 mg/dL    Creatinine 0.59 0.55 - 1.02 mg/dL    BUN/Creatinine ratio 8 (L) 12 - 20      GFR est AA >60 >60 ml/min/1.73m2    GFR est non-AA >60 >60 ml/min/1.73m2    Calcium 8.4 (L) 8.5 - 10.1 mg/dL    Bilirubin, total 0.4 0.2 - 1.0 mg/dL    AST (SGOT) 38 (H) 15 - 37 U/L    ALT (SGPT) 31 12 - 78 U/L    Alk. phosphatase 93 45 - 117 U/L    Protein, total 5.9 (L) 6.4 - 8.2 g/dL    Albumin 2.0 (L) 3.5 - 5.0 g/dL    Globulin 3.9 2.0 - 4.0 g/dL    A-G Ratio 0.5 (L) 1.1 - 2.2     CBC WITH AUTOMATED DIFF    Collection Time: 01/11/22 10:55 AM   Result Value Ref Range    WBC 15.0 (H) 3.6 - 11.0 K/uL    RBC 4.53 3.80 - 5.20 M/uL    HGB 9.0 (L) 11.5 - 16.0 g/dL    HCT 31.0 (L) 35.0 - 47.0 %    MCV 68.4 (L) 80.0 - 99.0 FL    MCH 19.9 (L) 26.0 - 34.0 PG    MCHC 29.0 (L) 30.0 - 36.5 g/dL    RDW 31.4 (H) 11.5 - 14.5 %    PLATELET 882 (H) 232 - 400 K/uL    MPV 8.2 (L) 8.9 - 12.9 FL    NRBC 0.0 0.0  WBC    ABSOLUTE NRBC 0.00 0.00 - 0.01 K/uL    NEUTROPHILS 82 (H) 32 - 75 %    LYMPHOCYTES 11 (L) 12 - 49 %    MONOCYTES 6 5 - 13 %    EOSINOPHILS 0 0 - 7 %    BASOPHILS 0 0 - 1 %    IMMATURE GRANULOCYTES 1 (H) 0 - 0.5 %    ABS. NEUTROPHILS 12.3 (H) 1.8 - 8.0 K/UL    ABS. LYMPHOCYTES 1.7 0.8 - 3.5 K/UL    ABS. MONOCYTES 0.9 0.0 - 1.0 K/UL    ABS. EOSINOPHILS 0.0 0.0 - 0.4 K/UL    ABS. BASOPHILS 0.0 0.0 - 0.1 K/UL    ABS. IMM. GRANS. 0.1 (H) 0.00 - 0.04 K/UL    DF AUTOMATED         Physical Exam:  Physical Exam  Constitutional:       General: She is not in acute distress. Appearance: Normal appearance. She is obese.  She is not ill-appearing. HENT:      Head: Normocephalic and atraumatic. Cardiovascular:      Rate and Rhythm: Normal rate and regular rhythm. Heart sounds: Normal heart sounds. Pulmonary:      Breath sounds: Normal breath sounds. Abdominal:      General: There is no distension. Palpations: Abdomen is soft. There is no mass. Tenderness: There is some abdominal tenderness. Hernia: No hernia is present. Musculoskeletal:         General: Normal range of motion. Cervical back: Normal range of motion and neck supple. Skin:     General: Skin is warm and dry. Neurological:      General: No focal deficit present. Mental Status: She is alert and oriented to person, place, and time. Psychiatric:         Mood and Affect: Mood normal.         Thought Content:  Thought content normal.         Judgment: Judgment normal.   Patient has hematuria which appears to be clearing in the Alfaro catheter tubing  Assessment: Hematuria     Hospital Problems  Never Reviewed          Codes Class Noted POA    Cancer of sigmoid colon [facility]) ICD-10-CM: C18.7  ICD-9-CM: 153.3  1/10/2022 Unknown        Metastatic colon cancer to [facility]) ICD-10-CM: C18.9, C78.7  ICD-9-CM: 153.9, 197.7  1/10/2022 Unknown              Plan: Follow for now if it persist will scope the patient     Hospital Problems  Never Reviewed          Codes Class Noted POA    Cancer of sigmoid colon ([facility] [address] ICD-10-CM: C18.7  ICD-9-CM: 153.3  1/10/2022 Unknown        Metastatic colon cancer to [facility]) ICD-10-CM: C18.9, C78.7  ICD-9-CM: 153.9, 197.7  1/10/2022 Unknown                  Signed By: Reji Arambula MD     January 11, 2022

## 2022-01-11 NOTE — PROGRESS NOTES
CM attempted to meet with patient at [facility], however patient requested the CM come back later r/t she is in a lot of pain and needs to be more comfortable so she can concentrate on our discussion. Writer will f/u this afternoon with patient. Patient verbalized understanding.     Aby Locke

## 2022-01-11 NOTE — PROGRESS NOTES
PT eval order received and acknowledged. PT eval attempted at 1110am however pt declined session at this time due to pain. Attempted repositioning in pt in bed, noted bloody chux pad; nsg advised. Will continue to follow patient and attempt PT eval at a later time. Thank you.

## 2022-01-12 PROCEDURE — 97161 PT EVAL LOW COMPLEX 20 MIN: CPT

## 2022-01-12 PROCEDURE — 74011250637 HC RX REV CODE- 250/637: Performed by: COLON & RECTAL SURGERY

## 2022-01-12 PROCEDURE — 65270000029 HC RM PRIVATE

## 2022-01-12 PROCEDURE — 74011000258 HC RX REV CODE- 258: Performed by: COLON & RECTAL SURGERY

## 2022-01-12 PROCEDURE — 97530 THERAPEUTIC ACTIVITIES: CPT

## 2022-01-12 PROCEDURE — 74011000250 HC RX REV CODE- 250: Performed by: COLON & RECTAL SURGERY

## 2022-01-12 PROCEDURE — 99024 POSTOP FOLLOW-UP VISIT: CPT | Performed by: COLON & RECTAL SURGERY

## 2022-01-12 PROCEDURE — 74011250636 HC RX REV CODE- 250/636: Performed by: COLON & RECTAL SURGERY

## 2022-01-12 RX ORDER — DEXTROSE, SODIUM CHLORIDE, AND POTASSIUM CHLORIDE 5; .45; .15 G/100ML; G/100ML; G/100ML
125 INJECTION INTRAVENOUS CONTINUOUS
Status: DISCONTINUED | OUTPATIENT
Start: 2022-01-12 | End: 2022-01-21 | Stop reason: HOSPADM

## 2022-01-12 RX ADMIN — HYDROMORPHONE HYDROCHLORIDE 1 MG: 1 INJECTION, SOLUTION INTRAMUSCULAR; INTRAVENOUS; SUBCUTANEOUS at 01:37

## 2022-01-12 RX ADMIN — OXCARBAZEPINE 300 MG: 150 TABLET, FILM COATED ORAL at 21:12

## 2022-01-12 RX ADMIN — POTASSIUM CHLORIDE, DEXTROSE MONOHYDRATE AND SODIUM CHLORIDE 125 ML/HR: 150; 5; 450 INJECTION, SOLUTION INTRAVENOUS at 08:27

## 2022-01-12 RX ADMIN — PIPERACILLIN SODIUM AND TAZOBACTAM SODIUM 3.38 G: 3; .375 INJECTION, POWDER, LYOPHILIZED, FOR SOLUTION INTRAVENOUS at 06:27

## 2022-01-12 RX ADMIN — BUPROPION HYDROCHLORIDE 150 MG: 150 TABLET, FILM COATED, EXTENDED RELEASE ORAL at 08:26

## 2022-01-12 RX ADMIN — PIPERACILLIN SODIUM AND TAZOBACTAM SODIUM 3.38 G: 3; .375 INJECTION, POWDER, LYOPHILIZED, FOR SOLUTION INTRAVENOUS at 11:45

## 2022-01-12 RX ADMIN — BUSPIRONE HYDROCHLORIDE 7.5 MG: 5 TABLET ORAL at 21:13

## 2022-01-12 RX ADMIN — SODIUM CHLORIDE, PRESERVATIVE FREE 20 MG: 5 INJECTION INTRAVENOUS at 08:27

## 2022-01-12 RX ADMIN — POTASSIUM CHLORIDE, DEXTROSE MONOHYDRATE AND SODIUM CHLORIDE 125 ML/HR: 150; 5; 450 INJECTION, SOLUTION INTRAVENOUS at 17:44

## 2022-01-12 RX ADMIN — HYDROMORPHONE HYDROCHLORIDE 1 MG: 1 INJECTION, SOLUTION INTRAMUSCULAR; INTRAVENOUS; SUBCUTANEOUS at 08:27

## 2022-01-12 RX ADMIN — HYDROMORPHONE HYDROCHLORIDE 1.5 MG: 2 INJECTION INTRAMUSCULAR; INTRAVENOUS; SUBCUTANEOUS at 15:49

## 2022-01-12 RX ADMIN — PIPERACILLIN SODIUM AND TAZOBACTAM SODIUM 3.38 G: 3; .375 INJECTION, POWDER, LYOPHILIZED, FOR SOLUTION INTRAVENOUS at 00:00

## 2022-01-12 RX ADMIN — PIPERACILLIN SODIUM AND TAZOBACTAM SODIUM 3.38 G: 3; .375 INJECTION, POWDER, LYOPHILIZED, FOR SOLUTION INTRAVENOUS at 17:49

## 2022-01-12 RX ADMIN — BUSPIRONE HYDROCHLORIDE 7.5 MG: 5 TABLET ORAL at 08:26

## 2022-01-12 RX ADMIN — SODIUM CHLORIDE 125 ML/HR: 9 INJECTION, SOLUTION INTRAVENOUS at 06:35

## 2022-01-12 RX ADMIN — ONDANSETRON 4 MG: 2 INJECTION INTRAMUSCULAR; INTRAVENOUS at 15:49

## 2022-01-12 RX ADMIN — QUETIAPINE FUMARATE 100 MG: 100 TABLET ORAL at 08:27

## 2022-01-12 RX ADMIN — SODIUM CHLORIDE, PRESERVATIVE FREE 20 MG: 5 INJECTION INTRAVENOUS at 21:12

## 2022-01-12 NOTE — NURSE NAVIGATOR
Attempted to visit patient for support; she was sleeping soundly and did not rouse to her name being called. Will visit again.

## 2022-01-12 NOTE — PROGRESS NOTES
CM met with patient at bedside to inform her of the possibly of patient having to stay in a SNF at least 30 days r/t her insurance, per [facility]. Patient is ok with staying at least 30 days in a facility. [facility] informed via ALANNAH.     Marie Moya

## 2022-01-12 NOTE — ROUTINE PROCESS
Bedside shift change report given to Fidel rn (oncoming nurse) by Qian Bardales rn(offgoing nurse). Report included the following information SBAR.

## 2022-01-12 NOTE — PROGRESS NOTES
Progress Note    Patient: Negrito Valadez MRN: [id number]  SSN: [id number]    YOB: 1965  Age: 49 y.o. Sex: female      Admit Date: 1/10/2022    LOS: 2 days     Subjective:   Postoperative day 2  Patient seen in bed  Continues to complain of incisional pain   Urine clearing      Objective:     Vitals:    01/11/22 1630 01/11/22 2054 01/12/22 0137 01/12/22 0724   BP:  136/88 (!) 120/59 (!) 151/97   Pulse:  87 86 72   Resp:   20 20   Temp:   98.8 °F (37.1 °C) 98.2 °F (36.8 °C)   SpO2:   97% 97%   Weight: 158 lb 11.7 oz (72 kg)      Height: 4' 11\" (1.499 m)           Intake and Output:  Current Shift: No intake/output data recorded. Last three shifts: 01/10 1901 - 01/12 0700  In: 4140 [P.O.:90; I.V.:4050]  Out: 900 [Urine:900]    Review of Systems:  ROS     Physical Exam:   Abdomen is soft, appropriately tender, nondistended, midline incision dressing is clean and intact    Lab/Data Review:  Recent Results (from the past 24 hour(s))   METABOLIC PANEL, COMPREHENSIVE    Collection Time: 01/11/22 10:55 AM   Result Value Ref Range    Sodium 138 136 - 145 mmol/L    Potassium 4.0 3.5 - 5.1 mmol/L    Chloride 109 (H) 97 - 108 mmol/L    CO2 25 21 - 32 mmol/L    Anion gap 4 (L) 5 - 15 mmol/L    Glucose 124 (H) 65 - 100 mg/dL    BUN 5 (L) 6 - 20 mg/dL    Creatinine 0.59 0.55 - 1.02 mg/dL    BUN/Creatinine ratio 8 (L) 12 - 20      GFR est AA >60 >60 ml/min/1.73m2    GFR est non-AA >60 >60 ml/min/1.73m2    Calcium 8.4 (L) 8.5 - 10.1 mg/dL    Bilirubin, total 0.4 0.2 - 1.0 mg/dL    AST (SGOT) 38 (H) 15 - 37 U/L    ALT (SGPT) 31 12 - 78 U/L    Alk.  phosphatase 93 45 - 117 U/L    Protein, total 5.9 (L) 6.4 - 8.2 g/dL    Albumin 2.0 (L) 3.5 - 5.0 g/dL    Globulin 3.9 2.0 - 4.0 g/dL    A-G Ratio 0.5 (L) 1.1 - 2.2     CBC WITH AUTOMATED DIFF    Collection Time: 01/11/22 10:55 AM   Result Value Ref Range    WBC 15.0 (H) 3.6 - 11.0 K/uL    RBC 4.53 3.80 - 5.20 M/uL    HGB 9.0 (L) 11.5 - 16.0 g/dL    HCT 31.0 (L) 35.0 - 47.0 %    MCV 68.4 (L) 80.0 - 99.0 FL    MCH 19.9 (L) 26.0 - 34.0 PG    MCHC 29.0 (L) 30.0 - 36.5 g/dL    RDW 31.4 (H) 11.5 - 14.5 %    PLATELET 185 (H) 886 - 400 K/uL    MPV 8.2 (L) 8.9 - 12.9 FL    NRBC 0.0 0.0  WBC    ABSOLUTE NRBC 0.00 0.00 - 0.01 K/uL    NEUTROPHILS 82 (H) 32 - 75 %    LYMPHOCYTES 11 (L) 12 - 49 %    MONOCYTES 6 5 - 13 %    EOSINOPHILS 0 0 - 7 %    BASOPHILS 0 0 - 1 %    IMMATURE GRANULOCYTES 1 (H) 0 - 0.5 %    ABS. NEUTROPHILS 12.3 (H) 1.8 - 8.0 K/UL    ABS. LYMPHOCYTES 1.7 0.8 - 3.5 K/UL    ABS. MONOCYTES 0.9 0.0 - 1.0 K/UL    ABS. EOSINOPHILS 0.0 0.0 - 0.4 K/UL    ABS. BASOPHILS 0.0 0.0 - 0.1 K/UL    ABS. IMM.  GRANS. 0.1 (H) 0.00 - 0.04 K/UL    DF AUTOMATED            Assessment:     Active Problems:    Cancer of sigmoid colon ([facility] [address] (1/10/2022)      Metastatic colon cancer to liver [facility]) (1/10/2022)        Plan:   Continue n.p.o. awaiting return of bowel function  Alfaro to stay in  Out of bed to chair today  Incentive spirometry    Signed By: Jeison Benson MD     January 12, 2022

## 2022-01-12 NOTE — PROGRESS NOTES
Problem: Mobility Impaired (Adult and Pediatric)  Goal: *Acute Goals and Plan of Care (Insert Text)  Description: Physical Therapy Goals  Initiated 1/12/22  1)  I with HEP in 7 days to improve overall functional mobility. 2)  Bed mobility with min A in 7 days to prevent skin breakdown. 3)  Pt to perform stand pivot transfer from bed to chair with CGA in 7 days. 3)  Pt to amb 100ft with LRAD and sup in 7 days    Pt. Goal:  Pt to be able to walk safely without falls. Outcome: Not Met   PHYSICAL THERAPY EVALUATION  Patient: Dieter Mendoza (59 y.o. female)  Date: 1/12/2022  Primary Diagnosis: Cancer of sigmoid colon ([facility] [address] [C18.7]  Metastatic colon cancer to liver ([facility] [address] [C18.9, C78.7]  Procedure(s) (LRB):  EXPLORATORY LAPAROTOMY, SIGMOID COLECTOMY, LOW ANTERIOR RESECTION, APPENDECTOMY, LIVER METASTECTOMY, CYSTOURETHROSCOPY, BILATERAL URETERAL STENT PLACEMENT; (N/A)  CYSTOURETHROSCOPY, BILATERAL URETERAL STENT PLACEMENT (Bilateral) 2 Days Post-Op   Precautions: fall, log rolling technique for OOB       ASSESSMENT  Pt s/p EXPLORATORY LAPAROTOMY, SIGMOID COLECTOMY, LOW ANTERIOR RESECTION, APPENDECTOMY, LIVER METASTECTOMY, CYSTOURETHROSCOPY, BILATERAL URETERAL STENT PLACEMENT;  CYSTOURETHROSCOPY, BILATERAL URETERAL STENT PLACEMENT on 1/10/22. Pt with hx of anemia, depression, sigmoid colon CA and liver mets. Pt reports she lives in mobile home with her boyfriend with ramp entrance and that she was I with ADL's and ambulation without AD prior to admission. Per chart review, pt does not feel safe returning home and is requesting placement. Pt reporting 8-9/10 pain today in abdomen and requesting pain medications of which RN is aware. Based on the objective data described below, the patient presents with decreased LE strength, impaired balance, difficulty with bed mobility and transfers requiring mod A, and difficulty with gait requiring min A for 1 HHA to take a few small steps from bed to recliner.   Pt requiring additional time today for mobility and having difficulty rolling to L side to get OOB using log roll technique. Instead, pt requiring mod A by therapist to help slowly move patients legs while pt held onto therapist to pull herself forward slowly, requiring a rest break in the middle of the sup to sit transfer due to pain. Pt requiring constant cues for pursed lip breathing technique. Once sitting EOB, pt requiring mod A for sit to stand transfer and then ambulated a few steps over to bedside chair. However, this was not comfortable for pt, so a recliner was obtained and pt performed another transfer from bedside chair to recliner with mod A by therapist.  On next visit, recommend using RW for pt for ambulation. Nursing notified and RW left in pt room. Pt set up with tray table and call bell with RN notified that pt needed pain and nausea medicines. Pt would benefit from continued skilled PT services to improve LE strength, balance, overall functional mobility, and gait so she can return home safely. Recommend d/c to SNF. Other factors to consider for discharge: impaired mobility, level of assist     Patient will benefit from skilled therapy intervention to address the above noted impairments. PLAN :  Recommendations and Planned Interventions: bed mobility training, transfer training, gait training, therapeutic exercises, patient and family training/education, and therapeutic activities      Frequency/Duration: Patient will be followed by physical therapy:  3-5x/week to address goals. Recommendation for discharge: (in order for the patient to meet his/her long term goals)  López Nieto    This discharge recommendation:  Has been made in collaboration with the attending provider and/or case management    IF patient discharges home will need the following DME: none         SUBJECTIVE:   Patient stated I need my pain medicine.     OBJECTIVE DATA SUMMARY:   HISTORY:    Past Medical History:   Diagnosis Date    Anemia     Cancer [facility])     sigmoid colon - per Dr. Higginbotham Sour order    Depression     GERD (gastroesophageal reflux disease)     Liver spots      Past Surgical History:   Procedure Laterality Date    HX BILATERAL SALPINGECTOMY      HX  SECTION      2    HX COLONOSCOPY  2021           Personal factors and/or comorbidities impacting plan of care: impaired mobility, level of assist    Home Situation  Home Environment: Trailer/mobile home  Wheelchair Ramp: Yes  One/Two Story Residence: One story  Living Alone: No  Support Systems: Spouse/Significant Other  Patient Expects to be Discharged to[de-identified] Skilled nursing facility  Current DME Used/Available at Home: None    PLOF: Pt I for ADLS/IADLS, I with mobility prior to admission. EXAMINATION/PRESENTATION/DECISION MAKING:   Critical Behavior:  Neurologic State: Alert  Orientation Level: Oriented X4        Hearing:   Auditory  Auditory Impairment: None    Range Of Motion:  AROM: Within functional limits                       Strength:    Strength: Generally decreased, functional         Right Left   HIP FLEXION 3-/5 3-/5   HIP EXTENSION     HIP ABDUCTION     HIP ADDUCTION     KNEE FLEXION 4-/5 4-/5   KNEE EXTENSION 4-/5 4-/5   ANKLE PF  5/5 5/5   ANKLE DF 5/5 5/5     0/5       No palpable muscle contraction  1/5       Palpable muscle contraction, no joint movement  2-/5      Less than full range of motion in gravity eliminated position  2/5       Able to complete full range of motion in gravity eliminated position  2+/5     Able to initiate movement against gravity  3-/5      More than half but not full range of motion against gravity  3/5       Able to complete full range of motion against gravity  3+/5     Completes full range of motion against gravity with minimal resistance  4-/5      Completes full range of motion against gravity with minimal-moderate resistance  4/5       Completes full range of motion against gravity with moderate resistance  4+/5     Completes full range of motion against gravity with moderate-maximum resistance  5/5       Completes full range of motion against gravity with maximum resistance               Tone & Sensation:                                  Coordination:     Vision:      Functional Mobility:  Bed Mobility:  Rolling: Moderate assistance  Supine to Sit: Moderate assistance     Scooting: Moderate assistance  Transfers:  Sit to Stand: Moderate assistance  Stand to Sit: Moderate assistance        Bed to Chair: Moderate assistance              Balance:   Sitting: Impaired  Sitting - Static: Good (unsupported)  Sitting - Dynamic: Fair (occasional)  Standing: Impaired  Standing - Static: Fair;Constant support  Standing - Dynamic : Fair;Constant support  Ambulation/Gait Training:  Distance (ft): 2 Feet (ft)  Assistive Device:  (1 HHA)  Ambulation - Level of Assistance: Minimal assistance     Gait Description (WDL): Exceptions to WDL                 Speed/Lynda: Slow  Step Length: Left shortened;Right shortened                              Functional Measure:    [facility] AM-PAC 6 Clicks         Basic Mobility Inpatient Short Form  How much difficulty does the patient currently have. .. Unable A Lot A Little None   1. Turning over in bed (including adjusting bedclothes, sheets and blankets)? [] 1   [x] 2   [] 3   [] 4   2. Sitting down on and standing up from a chair with arms ( e.g., wheelchair, bedside commode, etc.)   [] 1   [x] 2   [] 3   [] 4   3. Moving from lying on back to sitting on the side of the bed? [] 1   [x] 2   [] 3   [] 4          How much help from another person does the patient currently need. .. Total A Lot A Little None   4. Moving to and from a bed to a chair (including a wheelchair)? [] 1   [] 2   [x] 3   [] 4   5. Need to walk in hospital room? [] 1   [] 2   [x] 3   [] 4   6. Climbing 3-5 steps with a railing?    [] 1   [x] 2   [] 3   [] 4 © 2007, Trustees of [address] [place] [address] [address] [address], under license to [employer]. All rights reserved     Score:  Initial: 14 Most Recent: X (Date:22)   Interpretation of Tool:  Represents activities that are increasingly more difficult (i.e. Bed mobility, Transfers, Gait). Score 24 23 22-20 19-15 14-10 9-7 6   Modifier CH CI CJ CK CL CM CN          Physical Therapy Evaluation Charge Determination   History Examination Presentation Decision-Making   MEDIUM  Complexity : 1-2 comorbidities / personal factors will impact the outcome/ POC  MEDIUM Complexity : 3 Standardized tests and measures addressing body structure, function, activity limitation and / or participation in recreation  LOW Complexity : Stable, uncomplicated  Other Functional Measure [facility] 6 low      Based on the above components, the patient evaluation is determined to be of the following complexity level: LOW     Pain Ratin-9/10 in abdomen    Activity Tolerance:   Fair and requires frequent rest breaks  Please refer to the flowsheet for vital signs taken during this treatment. After treatment patient left in no apparent distress:   Sitting in chair and Call bell within reach    COMMUNICATION/EDUCATION:   The patients plan of care was discussed with: Registered nurse. Patient/family agree to work toward stated goals and plan of care.     Thank you for this referral.  Noreene Degree   Time Calculation: 39 mins

## 2022-01-13 LAB
ALBUMIN SERPL-MCNC: 1.9 G/DL (ref 3.5–5)
ALBUMIN/GLOB SERPL: 0.4 {RATIO} (ref 1.1–2.2)
ALP SERPL-CCNC: 77 U/L (ref 45–117)
ALT SERPL-CCNC: 18 U/L (ref 12–78)
ANION GAP SERPL CALC-SCNC: 3 MMOL/L (ref 5–15)
AST SERPL W P-5'-P-CCNC: 16 U/L (ref 15–37)
BASOPHILS # BLD: 0 K/UL (ref 0–0.1)
BASOPHILS NFR BLD: 1 % (ref 0–1)
BILIRUB SERPL-MCNC: 0.3 MG/DL (ref 0.2–1)
BUN SERPL-MCNC: 1 MG/DL (ref 6–20)
BUN/CREAT SERPL: 2 (ref 12–20)
CA-I BLD-MCNC: 8.4 MG/DL (ref 8.5–10.1)
CHLORIDE SERPL-SCNC: 109 MMOL/L (ref 97–108)
CO2 SERPL-SCNC: 29 MMOL/L (ref 21–32)
CREAT SERPL-MCNC: 0.54 MG/DL (ref 0.55–1.02)
DIFFERENTIAL METHOD BLD: ABNORMAL
EOSINOPHIL # BLD: 0.2 K/UL (ref 0–0.4)
EOSINOPHIL NFR BLD: 3 % (ref 0–7)
ERYTHROCYTE [DISTWIDTH] IN BLOOD BY AUTOMATED COUNT: 31.3 % (ref 11.5–14.5)
GLOBULIN SER CALC-MCNC: 4.3 G/DL (ref 2–4)
GLUCOSE SERPL-MCNC: 124 MG/DL (ref 65–100)
HCT VFR BLD AUTO: 27.5 % (ref 35–47)
HGB BLD-MCNC: 7.9 G/DL (ref 11.5–16)
IMM GRANULOCYTES # BLD AUTO: 0 K/UL (ref 0–0.04)
IMM GRANULOCYTES NFR BLD AUTO: 0 % (ref 0–0.5)
LYMPHOCYTES # BLD: 1.6 K/UL (ref 0.8–3.5)
LYMPHOCYTES NFR BLD: 21 % (ref 12–49)
MCH RBC QN AUTO: 20.1 PG (ref 26–34)
MCHC RBC AUTO-ENTMCNC: 28.7 G/DL (ref 30–36.5)
MCV RBC AUTO: 70 FL (ref 80–99)
MONOCYTES # BLD: 0.4 K/UL (ref 0–1)
MONOCYTES NFR BLD: 6 % (ref 5–13)
NEUTS SEG # BLD: 5.1 K/UL (ref 1.8–8)
NEUTS SEG NFR BLD: 69 % (ref 32–75)
NRBC # BLD: 0 K/UL (ref 0–0.01)
NRBC BLD-RTO: 0 PER 100 WBC
PLATELET # BLD AUTO: 511 K/UL (ref 150–400)
PMV BLD AUTO: 8.4 FL (ref 8.9–12.9)
POTASSIUM SERPL-SCNC: 3.4 MMOL/L (ref 3.5–5.1)
PROT SERPL-MCNC: 6.2 G/DL (ref 6.4–8.2)
RBC # BLD AUTO: 3.93 M/UL (ref 3.8–5.2)
SODIUM SERPL-SCNC: 141 MMOL/L (ref 136–145)
WBC # BLD AUTO: 7.3 K/UL (ref 3.6–11)

## 2022-01-13 PROCEDURE — 36415 COLL VENOUS BLD VENIPUNCTURE: CPT

## 2022-01-13 PROCEDURE — 65270000029 HC RM PRIVATE

## 2022-01-13 PROCEDURE — 74011250637 HC RX REV CODE- 250/637: Performed by: COLON & RECTAL SURGERY

## 2022-01-13 PROCEDURE — 74011250636 HC RX REV CODE- 250/636: Performed by: COLON & RECTAL SURGERY

## 2022-01-13 PROCEDURE — 80053 COMPREHEN METABOLIC PANEL: CPT

## 2022-01-13 PROCEDURE — 74011000258 HC RX REV CODE- 258: Performed by: COLON & RECTAL SURGERY

## 2022-01-13 PROCEDURE — 99024 POSTOP FOLLOW-UP VISIT: CPT | Performed by: COLON & RECTAL SURGERY

## 2022-01-13 PROCEDURE — 85025 COMPLETE CBC W/AUTO DIFF WBC: CPT

## 2022-01-13 PROCEDURE — 74011000250 HC RX REV CODE- 250: Performed by: COLON & RECTAL SURGERY

## 2022-01-13 RX ORDER — GUAIFENESIN 600 MG/1
600 TABLET, EXTENDED RELEASE ORAL EVERY 12 HOURS
Status: DISCONTINUED | OUTPATIENT
Start: 2022-01-13 | End: 2022-01-21 | Stop reason: HOSPADM

## 2022-01-13 RX ADMIN — OXYCODONE HYDROCHLORIDE 5 MG: 5 TABLET ORAL at 12:09

## 2022-01-13 RX ADMIN — CYCLOBENZAPRINE 10 MG: 10 TABLET, FILM COATED ORAL at 08:27

## 2022-01-13 RX ADMIN — GUAIFENESIN 600 MG: 600 TABLET, EXTENDED RELEASE ORAL at 08:26

## 2022-01-13 RX ADMIN — PIPERACILLIN SODIUM AND TAZOBACTAM SODIUM 3.38 G: 3; .375 INJECTION, POWDER, LYOPHILIZED, FOR SOLUTION INTRAVENOUS at 12:09

## 2022-01-13 RX ADMIN — HYDROMORPHONE HYDROCHLORIDE 1.5 MG: 2 INJECTION INTRAMUSCULAR; INTRAVENOUS; SUBCUTANEOUS at 08:27

## 2022-01-13 RX ADMIN — BUSPIRONE HYDROCHLORIDE 7.5 MG: 5 TABLET ORAL at 08:26

## 2022-01-13 RX ADMIN — ONDANSETRON 4 MG: 2 INJECTION INTRAMUSCULAR; INTRAVENOUS at 08:28

## 2022-01-13 RX ADMIN — OXYCODONE HYDROCHLORIDE 5 MG: 5 TABLET ORAL at 08:26

## 2022-01-13 RX ADMIN — QUETIAPINE FUMARATE 100 MG: 100 TABLET ORAL at 08:26

## 2022-01-13 RX ADMIN — BUPROPION HYDROCHLORIDE 150 MG: 150 TABLET, FILM COATED, EXTENDED RELEASE ORAL at 08:26

## 2022-01-13 RX ADMIN — GUAIFENESIN 600 MG: 600 TABLET, EXTENDED RELEASE ORAL at 23:44

## 2022-01-13 RX ADMIN — PIPERACILLIN SODIUM AND TAZOBACTAM SODIUM 3.38 G: 3; .375 INJECTION, POWDER, LYOPHILIZED, FOR SOLUTION INTRAVENOUS at 23:44

## 2022-01-13 RX ADMIN — PIPERACILLIN SODIUM AND TAZOBACTAM SODIUM 3.38 G: 3; .375 INJECTION, POWDER, LYOPHILIZED, FOR SOLUTION INTRAVENOUS at 06:19

## 2022-01-13 RX ADMIN — HYDROMORPHONE HYDROCHLORIDE 1 MG: 1 INJECTION, SOLUTION INTRAMUSCULAR; INTRAVENOUS; SUBCUTANEOUS at 12:09

## 2022-01-13 RX ADMIN — BUSPIRONE HYDROCHLORIDE 7.5 MG: 5 TABLET ORAL at 23:43

## 2022-01-13 RX ADMIN — OXYCODONE HYDROCHLORIDE 5 MG: 5 TABLET ORAL at 18:15

## 2022-01-13 RX ADMIN — PIPERACILLIN SODIUM AND TAZOBACTAM SODIUM 3.38 G: 3; .375 INJECTION, POWDER, LYOPHILIZED, FOR SOLUTION INTRAVENOUS at 00:23

## 2022-01-13 RX ADMIN — OXCARBAZEPINE 300 MG: 150 TABLET, FILM COATED ORAL at 23:44

## 2022-01-13 RX ADMIN — HYDROMORPHONE HYDROCHLORIDE 1 MG: 1 INJECTION, SOLUTION INTRAMUSCULAR; INTRAVENOUS; SUBCUTANEOUS at 00:30

## 2022-01-13 RX ADMIN — ONDANSETRON 4 MG: 2 INJECTION INTRAMUSCULAR; INTRAVENOUS at 00:31

## 2022-01-13 RX ADMIN — SODIUM CHLORIDE, PRESERVATIVE FREE 20 MG: 5 INJECTION INTRAVENOUS at 23:45

## 2022-01-13 RX ADMIN — CYCLOBENZAPRINE 10 MG: 10 TABLET, FILM COATED ORAL at 18:15

## 2022-01-13 RX ADMIN — SODIUM CHLORIDE, PRESERVATIVE FREE 20 MG: 5 INJECTION INTRAVENOUS at 08:27

## 2022-01-13 NOTE — PROGRESS NOTES
Progress Note    Patient: Christina Velasquez MRN: [id number]  SSN: [id number]    YOB: 1965  Age: 49 y.o. Sex: female      Admit Date: 1/10/2022    LOS: 3 days     Subjective:   Postoperative day 3  Patient seen in bed  Continues to complain of significant incisional pain  Urine is clear  Denies flatus    Objective:     Vitals:    01/12/22 1446 01/12/22 1953 01/13/22 0021 01/13/22 0750   BP: (!) 153/91 134/84 (!) 137/92 (!) 145/81   Pulse: 70 71 74 68   Resp: 18 18 20 17   Temp: 98.2 °F (36.8 °C) 98.2 °F (36.8 °C) 98.3 °F (36.8 °C) 97.9 °F (36.6 °C)   SpO2: 97% 98% 97% 98%   Weight:       Height:            Intake and Output:  Current Shift: No intake/output data recorded. Last three shifts: 01/11 1901 - 01/13 0700  In: 3789.6 [P.O.:90; I.V.:3699.6]  Out: 750 [Urine:750]    Review of Systems:  ROS     Physical Exam:   Abdomen is soft, appropriately tender, nondistended, midline incision is clean and intact    Lab/Data Review:  No results found for this or any previous visit (from the past 24 hour(s)).    A.m. labs    Assessment:     Active Problems:    Cancer of sigmoid colon ([facility] [address] (1/10/2022)      Metastatic colon cancer to liver [facility]) (1/10/2022)        Plan:   Out of bed to chair  Incentive spirometry  Pain management  Follow-up a.m. labs  Abdominal binder  Continue n.p.o., sips and ice chips only awaiting return of bowel function    Signed By: Jarod Barr MD     January 13, 2022

## 2022-01-13 NOTE — NURSE NAVIGATOR
Visited patient to provide support. Patient c/o pain but said it is \"better\". Patient denied any needs at this time. Advised that I will follow her as outpatient as well.

## 2022-01-13 NOTE — PROGRESS NOTES
Pt complaining of Alfaro leaking and soiling the bed. Contacted Carmel Bustos and Obinna Frye who both stated Alfaro could be removed. Advised pt Alfaro needed to be removed and to start using the bedside commode. Pt refused and stated she is not ready for the Alfaro to come out that she has only gotten up twice since surgery. Educated pt that this is a problem and the nurse has the time to get her up to the commode when needed. Pt refused and stated she might allow it to be removed tomorrow.

## 2022-01-14 PROCEDURE — 65270000029 HC RM PRIVATE

## 2022-01-14 PROCEDURE — 74011000250 HC RX REV CODE- 250: Performed by: COLON & RECTAL SURGERY

## 2022-01-14 PROCEDURE — 97165 OT EVAL LOW COMPLEX 30 MIN: CPT

## 2022-01-14 PROCEDURE — 97530 THERAPEUTIC ACTIVITIES: CPT

## 2022-01-14 PROCEDURE — 74011000258 HC RX REV CODE- 258: Performed by: COLON & RECTAL SURGERY

## 2022-01-14 PROCEDURE — 74011250637 HC RX REV CODE- 250/637: Performed by: COLON & RECTAL SURGERY

## 2022-01-14 PROCEDURE — 74011250636 HC RX REV CODE- 250/636: Performed by: COLON & RECTAL SURGERY

## 2022-01-14 PROCEDURE — 99024 POSTOP FOLLOW-UP VISIT: CPT | Performed by: COLON & RECTAL SURGERY

## 2022-01-14 RX ADMIN — BUSPIRONE HYDROCHLORIDE 7.5 MG: 5 TABLET ORAL at 20:01

## 2022-01-14 RX ADMIN — OXYCODONE HYDROCHLORIDE 5 MG: 5 TABLET ORAL at 18:09

## 2022-01-14 RX ADMIN — PIPERACILLIN SODIUM AND TAZOBACTAM SODIUM 3.38 G: 3; .375 INJECTION, POWDER, LYOPHILIZED, FOR SOLUTION INTRAVENOUS at 13:48

## 2022-01-14 RX ADMIN — SODIUM CHLORIDE, PRESERVATIVE FREE 20 MG: 5 INJECTION INTRAVENOUS at 20:01

## 2022-01-14 RX ADMIN — SODIUM CHLORIDE, PRESERVATIVE FREE 20 MG: 5 INJECTION INTRAVENOUS at 09:33

## 2022-01-14 RX ADMIN — GUAIFENESIN 600 MG: 600 TABLET, EXTENDED RELEASE ORAL at 20:01

## 2022-01-14 RX ADMIN — PIPERACILLIN SODIUM AND TAZOBACTAM SODIUM 3.38 G: 3; .375 INJECTION, POWDER, LYOPHILIZED, FOR SOLUTION INTRAVENOUS at 18:09

## 2022-01-14 RX ADMIN — CYCLOBENZAPRINE 10 MG: 10 TABLET, FILM COATED ORAL at 20:10

## 2022-01-14 RX ADMIN — QUETIAPINE FUMARATE 100 MG: 100 TABLET ORAL at 09:32

## 2022-01-14 RX ADMIN — OXYCODONE HYDROCHLORIDE 5 MG: 5 TABLET ORAL at 10:12

## 2022-01-14 RX ADMIN — CYCLOBENZAPRINE 10 MG: 10 TABLET, FILM COATED ORAL at 13:48

## 2022-01-14 RX ADMIN — GUAIFENESIN 600 MG: 600 TABLET, EXTENDED RELEASE ORAL at 09:32

## 2022-01-14 RX ADMIN — PIPERACILLIN SODIUM AND TAZOBACTAM SODIUM 3.38 G: 3; .375 INJECTION, POWDER, LYOPHILIZED, FOR SOLUTION INTRAVENOUS at 23:49

## 2022-01-14 RX ADMIN — PIPERACILLIN SODIUM AND TAZOBACTAM SODIUM 3.38 G: 3; .375 INJECTION, POWDER, LYOPHILIZED, FOR SOLUTION INTRAVENOUS at 06:00

## 2022-01-14 RX ADMIN — BUSPIRONE HYDROCHLORIDE 7.5 MG: 5 TABLET ORAL at 09:34

## 2022-01-14 RX ADMIN — OXCARBAZEPINE 300 MG: 150 TABLET, FILM COATED ORAL at 21:24

## 2022-01-14 RX ADMIN — BUPROPION HYDROCHLORIDE 150 MG: 150 TABLET, FILM COATED, EXTENDED RELEASE ORAL at 09:30

## 2022-01-14 RX ADMIN — POTASSIUM CHLORIDE, DEXTROSE MONOHYDRATE AND SODIUM CHLORIDE 125 ML/HR: 150; 5; 450 INJECTION, SOLUTION INTRAVENOUS at 10:12

## 2022-01-14 RX ADMIN — OXYCODONE HYDROCHLORIDE 5 MG: 5 TABLET ORAL at 23:54

## 2022-01-14 RX ADMIN — POTASSIUM CHLORIDE, DEXTROSE MONOHYDRATE AND SODIUM CHLORIDE 125 ML/HR: 150; 5; 450 INJECTION, SOLUTION INTRAVENOUS at 20:05

## 2022-01-14 NOTE — PROGRESS NOTES
OCCUPATIONAL THERAPY EVALUATION  Patient: Fern Rendon (58 y.o. female)  Date: 1/14/2022  Primary Diagnosis: Cancer of sigmoid colon ([facility] [address] [C18.7]  Metastatic colon cancer to liver ([facility]) [C18.9, C78.7]  Procedure(s) (LRB):  EXPLORATORY LAPAROTOMY, SIGMOID COLECTOMY, LOW ANTERIOR RESECTION, APPENDECTOMY, LIVER METASTECTOMY, CYSTOURETHROSCOPY, BILATERAL URETERAL STENT PLACEMENT; (N/A)  CYSTOURETHROSCOPY, BILATERAL URETERAL STENT PLACEMENT (Bilateral) 4 Days Post-Op   Precautions: Fall risk       ASSESSMENT  Pt is a 48 y/o F with PMH of anemia, sigmoid colon cancer, depression, GERD, and liver spots presenting to [facility] with colon cancer metastatic to the liver, admitted 01/10/22. Pt is s/p sigmoid colectomy, appendectomy, liver metastectomy, cystourethroscopy, and bilateral ureteral stent placement. Pt received bathing herself in the chair with a wash cloth and basin upon OT arrival, AXO x4, and agreeable to OT evaluation at this time. Pt demonstrated some agitation upon OT entry and reported she was experiencing discomfort in her abdomen after sitting up and bathing herself. Per pt report, pt lives with her boyfriend in a trailer home, however she reports that she will not be returning home at d/c. Pt reports she was IND with ADLs and mobility at [facility]. Based on current observations, pt presents with deficits in generalized strength, balance, functional activity tolerance, and increased pain impacting overall performance of ADLs and functional transfers/mobility. Pt reports 9-10/10 pain in her abdomen with activity. Pt able to wash face and hands while seated in chair with set up of wet wash cloth. Pt able to don socks while seated in chair with mod I, requiring additional time due to pain. Pt sat in chair and cleaned legs with wipe due to barriga leaking with set up to hand pt wipe. Pt requires SBA/CGA for sit in chair>stand with RW transfer.  Pt asked to ambulate to the bathroom, however, pt declined today due to pain and discomfort with barriga catheter leaking onto floor. Pt reports she notified nursing about leaking barriga prior to OT session. Pt then educated on potential use of BSC for toileting. Pt verbalized understanding and expressed concerns about wiping self after having a BM. Pt educated on trial and error during toileting routine as well as hospital staff being able to assist if pt requires assistance with toileting currently. Pt verbalized understanding. Pt completed stand pivot transfer from chair to bed using RW with SBA. Pt completed standing with RW>seated EOB with SBA/CGA. Pt transferred from seated EOB>sup with min A to guide BLE onto bed. Pt scooted toward HOB with SBA. Pt required additional time throughout session to complete tasks due to pain. Overall, pt tolerates session fair. Pt would benefit from continued skilled OT services to address current impairments and improve IND and safety with self cares and functional transfers/mobility. Recommend discharge to SNF once medically appropriate. Other factors to consider for discharge: home support, severity of deficits     Patient will benefit from skilled therapy intervention to address the above noted impairments. PLAN :  Recommendations and Planned Interventions: self care training, functional mobility training, therapeutic exercise, therapeutic activities, endurance activities and patient education    Frequency/Duration: Patient will be followed by occupational therapy:  3-5x/week to address goals. Recommendation for discharge: (in order for the patient to meet his/her long term goals)  López Nieto    This discharge recommendation:  Has been made in collaboration with the attending provider and/or case management    IF patient discharges home will need the following DME: bedside commode, shower chair and walker: rolling       SUBJECTIVE:   Patient stated I am having pain in my abdomen because I've been sitting up too long.  I need to get back in bed.     OBJECTIVE DATA SUMMARY:   HISTORY:   Past Medical History:   Diagnosis Date    Anemia     Cancer ([facility] [address]     sigmoid colon - per Dr. Lio Estevez order    Depression     GERD (gastroesophageal reflux disease)     Liver spots      Past Surgical History:   Procedure Laterality Date    HX BILATERAL SALPINGECTOMY      HX  SECTION      2    HX COLONOSCOPY  2021           Expanded or extensive additional review of patient history:     Home Situation  Home Environment: Trailer/mobile home  Wheelchair Ramp: Yes  One/Two Story Residence: One story  Living Alone: No  Support Systems: Spouse/Significant Other  Patient Expects to be Discharged to[de-identified] Skilled nursing facility  Current DME Used/Available at Home: None    Hand dominance: Right    EXAMINATION OF PERFORMANCE DEFICITS:  Cognitive/Behavioral Status:  Neurologic State: Alert  Orientation Level: Oriented X4         Hearing: Auditory  Auditory Impairment: None      Range of Motion:  AROM: Within functional limits                         Strength:  Strength: Within functional limits                Coordination:     Fine Motor Skills-Upper: Left Intact; Right Intact    Gross Motor Skills-Upper: Left Intact; Right Intact      Balance:  Sitting: Impaired; Without support  Sitting - Static: Good (unsupported)  Sitting - Dynamic: Fair (occasional)  Standing: Impaired  Standing - Static: Fair  Standing - Dynamic : Fair    Functional Mobility and Transfers for ADLs:  Bed Mobility:  Sit to Supine: Minimum assistance (To guide BLE)  Scooting: Stand-by assistance    Transfers:  Sit to Stand: Stand-by assistance;Contact guard assistance  Stand to Sit: Stand-by assistance;Contact guard assistance      ADL Intervention and task modifications:  Feeding  Drink to Mouth: Set-up (To hand pt water bottle)    Grooming  Position Performed: Seated in chair  Washing Face: Set-up (To hand pt wet wash cloth)  Brushing Teeth: Set-up (To hand pt toothbrush and toothpaste)         Lower Body Bathing  Lower Body : Set-up (To hand pt wipes)  Position Performed: Seated in chair         Lower Body Dressing Assistance  Socks: Modified independent  Leg Crossed Method Used: No  Position Performed: Seated in chair              Therapeutic Exercise:  Pt would benefit from UE HEP initiated at next session. Functional Measure:    [employer] AM-PACTM \"6 Clicks\"                                                       Daily Activity Inpatient Short Form  How much help from another person does the patient currently need. .. Total; A Lot A Little None   1. Putting on and taking off regular lower body clothing? []  1 []  2 [x]  3 []  4   2. Bathing (including washing, rinsing, drying)? []  1 []  2 [x]  3 []  4   3. Toileting, which includes using toilet, bedpan or urinal? [] 1 [x]  2 []  3 []  4   4. Putting on and taking off regular upper body clothing? []  1 []  2 [x]  3 []  4   5. Taking care of personal grooming such as brushing teeth? []  1 []  2 [x]  3 []  4   6. Eating meals? []  1 []  2 []  3 [x]  4   © 2007, Trustees of [facility] [employer], under license to "[employer]. All rights reserved     Score: 18/24     Interpretation of Tool:  Represents clinically-significant functional categories (i.e. Activities of daily living).   Percentage of Impairment CH    0%   CI    1-19% CJ    20-39% CK    40-59% CL    60-79% CM    80-99% CN     100%   [facility]  Score 6-24 24 23 20-22 15-19 10-14 7-9 6         Occupational Therapy Evaluation Charge Determination   History Examination Decision-Making   LOW Complexity : Brief history review  LOW Complexity : 1-3 performance deficits relating to physical, cognitive , or psychosocial skils that result in activity limitations and / or participation restrictions  LOW Complexity : No comorbidities that affect functional and no verbal or physical assistance needed to complete eval tasks       Based on the above components, the patient evaluation is determined to be of the following complexity level: LOW   Pain Ratin-10/10 abdomen with activity    Activity Tolerance:   Fair and requires rest breaks  Please refer to the flowsheet for vital signs taken during this treatment. After treatment patient left in no apparent distress:    Supine in bed, Call bell within reach and Side rails x 3    COMMUNICATION/EDUCATION:   The patients plan of care was discussed with: Registered nurse. Patient/family agree to work toward stated goals and plan of care. This patients plan of care is appropriate for delegation to [facility]. Thank you for this referral.  Mary Munoz, OT  Time Calculation: 41 mins    Problem: Self Care Deficits Care Plan (Adult)  Goal: *Acute Goals and Plan of Care (Insert Text)  Description: 1. Pt will be IND sup <> sit in prep for EOB ADLs  2. Pt will be IND grooming standing at sink  3. Pt will be IND LE dressing sitting EOB/long sit  4. Pt will be IND sit <>  prep for toileting LRAD  5. Pt will be IND toileting/toilet transfer/cloth mgmt LRAD  6.  Pt will be IND following UE HEP in prep for self care tasks      Outcome: Not Met

## 2022-01-14 NOTE — PROGRESS NOTES
Comprehensive Nutrition Assessment    Type and Reason for Visit: NPO/clear liquid    Nutrition Recommendations/Plan:   Advance diet as tolerated to goal of GI Soft     Continue D5 at 125ml/hr, providing 508kcals/d    Nutrition Assessment:  Admitted for elective ex-lap for resection of colon and liver cancer (1/10), unknown amount of colon removed. Pt now NPO x4 days awaiting return of bowel function. Pt endorses intermittent PO pta, wt fluctuates with fluid. Stated she is starting to feel hungry and has sharp abd pain that may be gas-related. Continue NPO with D5 providing protein-sparing calories. Labs: H/H 7.9/27.5, K+ 3.4, BUN 1, Cr 0.54, , Alb 1.9. Meds: Buproprion, D5, Pepcid, oxycodone, zosyn, seroquel. Malnutrition Assessment:  Malnutrition Status:  Mild malnutrition    Context:  Chronic illness     Findings of the 6 clinical characteristics of malnutrition:   Energy Intake:  7 - 75% or less est energy requirements for 1 month or longer (per diet recall)  Weight Loss:  No significant weight loss     Body Fat Loss:  No significant body fat loss,     Muscle Mass Loss:  No significant muscle mass loss,    Fluid Accumulation:  No significant fluid accumulation,        Estimated Daily Nutrient Needs:  Energy (kcal): 1507kcals (30kcals/kg, cancer); Weight Used for Energy Requirements: Adjusted (50.25kg)  Protein (g): 94g (1.3g/kg ABW); Weight Used for Protein Requirements: Current  Fluid (ml/day): 1500ml; Method Used for Fluid Requirements: 1 ml/kcal      Nutrition Related Findings:  Pt appears well nourished, obese. Denies issues with c/s. No N/V/D/C, last BM pta. No edema.       Wounds:   Surgical incision (vagina, abd)       Current Nutrition Therapies:  DIET NPO Sips of Water with Meds    Anthropometric Measures:  · Height:  4' 11.02\" (149.9 cm)  · Current Body Wt:  72 kg (158 lb 11.7 oz) (1/11)   · Admission Body Wt:  158 lb 11.7 oz (1/11)    · Usual Body Wt:  63.5 kg (140 lb) (per pt, wt fluctuates with fluid)  · Ideal Body Wt:  95 lbs:  167.1 %   · Adjusted Body Weight:   ; Weight Adjustment for:  (50.25kg)   · Adjusted BMI:       · BMI Category:  Obese class 1 (BMI 30.0-34. 9)     Wt Readings from Last 6 Encounters:   01/11/22 72 kg (158 lb 11.7 oz)   01/06/22 65.8 kg (145 lb)   12/23/21 68.1 kg (150 lb 2.1 oz)   12/15/21 67.6 kg (149 lb)   No recent wt loss    Nutrition Diagnosis:   · Inadequate protein-energy intake related to altered GI structure,altered GI function as evidenced by NPO or clear liquid status due to medical condition      Nutrition Interventions:   Food and/or Nutrient Delivery: Start oral diet  Nutrition Education and Counseling: No recommendations at this time  Coordination of Nutrition Care: Continue to monitor while inpatient    Goals:  Initiate diet capable of meeting >/=75% of EENs within 3-5 days, Intakes >/=50% of EENs in >5 days, Wt maintenance within +/-0.5kg in >5 days       Nutrition Monitoring and Evaluation:   Behavioral-Environmental Outcomes: None identified  Food/Nutrient Intake Outcomes: Food and nutrient intake,Diet advancement/tolerance  Physical Signs/Symptoms Outcomes: Biochemical data,Weight,GI status    Discharge Planning:     Too soon to determine     Electronically signed by [employer] on 1/14/2022 at 12:32 PM    Contact: Ext [phone], or via [employer]

## 2022-01-14 NOTE — PROGRESS NOTES
CM reviewed the clinical record and the discharge plan remains with patient dc'ing to SNF. Updated clinicals uploaded via [employer]. Patient remains NPO. Patient will be dc'd when medically stable and placement has been set up. CM will continue to follow patient's progress.     Marie Moya

## 2022-01-14 NOTE — PROGRESS NOTES
Progress Note    Patient: Jessica Rizzo MRN: [id number]  SSN: [id number]    YOB: 1965  Age: 49 y.o. Sex: female      Admit Date: 1/10/2022    LOS: 4 days     Subjective:   Postoperative day 4  Patient seen sitting in chair  Denies flatus or bowel movement  Denies nausea or vomiting    Objective:     Vitals:    01/14/22 0000 01/14/22 0529 01/14/22 0807 01/14/22 1225   BP: (!) 145/78 138/76 136/78 134/85   Pulse: 83 69 69    Resp: 20 18 17 16   Temp: 99.8 °F (37.7 °C) 98.4 °F (36.9 °C) 98.9 °F (37.2 °C) 98.9 °F (37.2 °C)   SpO2: 94% 96% 98% 98%   Weight:       Height:            Intake and Output:  Current Shift: No intake/output data recorded. Last three shifts: 01/12 1901 - 01/14 0700  In: 1230 [P.O.:120; I.V.:1110]  Out: 725 [Urine:725]    Review of Systems:  ROS     Physical Exam:   Abdomen is soft, nondistended, appropriately tender, midline incision clean    Lab/Data Review:  No results found for this or any previous visit (from the past 24 hour(s)).          Assessment:     Active Problems:    Cancer of sigmoid colon ([facility] [address] (1/10/2022)      Metastatic colon cancer to liver ([facility] [address] (1/10/2022)        Plan:   Continue n.p.o., sips of water and ice chips only awaiting return of bowel function  Discontinue Alfaro catheter  Ambulate in hallway  Incentive spirometry    Signed By: Tanmay Suarez MD     January 14, 2022

## 2022-01-14 NOTE — PROGRESS NOTES
Patient declined PT session this afternoon despite encouragement. Pt c/o fatigue and had one therapy session this morning already. Requested PT another time. Will cont to follow up.

## 2022-01-15 LAB
ALBUMIN SERPL-MCNC: 2 G/DL (ref 3.5–5)
ALBUMIN/GLOB SERPL: 0.5 {RATIO} (ref 1.1–2.2)
ALP SERPL-CCNC: 84 U/L (ref 45–117)
ALT SERPL-CCNC: 20 U/L (ref 12–78)
ANION GAP SERPL CALC-SCNC: 5 MMOL/L (ref 5–15)
AST SERPL W P-5'-P-CCNC: 20 U/L (ref 15–37)
BASOPHILS # BLD: 0 K/UL (ref 0–0.1)
BASOPHILS NFR BLD: 1 % (ref 0–1)
BILIRUB SERPL-MCNC: 0.4 MG/DL (ref 0.2–1)
BUN SERPL-MCNC: 2 MG/DL (ref 6–20)
BUN/CREAT SERPL: 3 (ref 12–20)
CA-I BLD-MCNC: 8.5 MG/DL (ref 8.5–10.1)
CHLORIDE SERPL-SCNC: 108 MMOL/L (ref 97–108)
CO2 SERPL-SCNC: 27 MMOL/L (ref 21–32)
CREAT SERPL-MCNC: 0.63 MG/DL (ref 0.55–1.02)
DIFFERENTIAL METHOD BLD: ABNORMAL
EOSINOPHIL # BLD: 0.8 K/UL (ref 0–0.4)
EOSINOPHIL NFR BLD: 12 % (ref 0–7)
ERYTHROCYTE [DISTWIDTH] IN BLOOD BY AUTOMATED COUNT: 31.4 % (ref 11.5–14.5)
GLOBULIN SER CALC-MCNC: 4 G/DL (ref 2–4)
GLUCOSE SERPL-MCNC: 107 MG/DL (ref 65–100)
HCT VFR BLD AUTO: 28.2 % (ref 35–47)
HGB BLD-MCNC: 8.3 G/DL (ref 11.5–16)
IMM GRANULOCYTES # BLD AUTO: 0 K/UL (ref 0–0.04)
IMM GRANULOCYTES NFR BLD AUTO: 1 % (ref 0–0.5)
LYMPHOCYTES # BLD: 1.7 K/UL (ref 0.8–3.5)
LYMPHOCYTES NFR BLD: 26 % (ref 12–49)
MCH RBC QN AUTO: 20.4 PG (ref 26–34)
MCHC RBC AUTO-ENTMCNC: 29.4 G/DL (ref 30–36.5)
MCV RBC AUTO: 69.5 FL (ref 80–99)
MONOCYTES # BLD: 0.5 K/UL (ref 0–1)
MONOCYTES NFR BLD: 7 % (ref 5–13)
NEUTS SEG # BLD: 3.4 K/UL (ref 1.8–8)
NEUTS SEG NFR BLD: 53 % (ref 32–75)
NRBC # BLD: 0 K/UL (ref 0–0.01)
NRBC BLD-RTO: 0 PER 100 WBC
PLATELET # BLD AUTO: 488 K/UL (ref 150–400)
PMV BLD AUTO: 8.4 FL (ref 8.9–12.9)
POTASSIUM SERPL-SCNC: 4 MMOL/L (ref 3.5–5.1)
PROT SERPL-MCNC: 6 G/DL (ref 6.4–8.2)
RBC # BLD AUTO: 4.06 M/UL (ref 3.8–5.2)
SODIUM SERPL-SCNC: 140 MMOL/L (ref 136–145)
WBC # BLD AUTO: 6.5 K/UL (ref 3.6–11)

## 2022-01-15 PROCEDURE — 74011000258 HC RX REV CODE- 258: Performed by: COLON & RECTAL SURGERY

## 2022-01-15 PROCEDURE — 97530 THERAPEUTIC ACTIVITIES: CPT

## 2022-01-15 PROCEDURE — 74011000250 HC RX REV CODE- 250: Performed by: COLON & RECTAL SURGERY

## 2022-01-15 PROCEDURE — 74011250637 HC RX REV CODE- 250/637: Performed by: COLON & RECTAL SURGERY

## 2022-01-15 PROCEDURE — 74011250636 HC RX REV CODE- 250/636: Performed by: COLON & RECTAL SURGERY

## 2022-01-15 PROCEDURE — 36415 COLL VENOUS BLD VENIPUNCTURE: CPT

## 2022-01-15 PROCEDURE — 85025 COMPLETE CBC W/AUTO DIFF WBC: CPT

## 2022-01-15 PROCEDURE — 99024 POSTOP FOLLOW-UP VISIT: CPT | Performed by: COLON & RECTAL SURGERY

## 2022-01-15 PROCEDURE — 80053 COMPREHEN METABOLIC PANEL: CPT

## 2022-01-15 PROCEDURE — 65270000029 HC RM PRIVATE

## 2022-01-15 RX ORDER — TRIAMCINOLONE ACETONIDE 1 MG/G
CREAM TOPICAL 2 TIMES DAILY
Status: DISCONTINUED | OUTPATIENT
Start: 2022-01-15 | End: 2022-01-21 | Stop reason: HOSPADM

## 2022-01-15 RX ADMIN — OXYCODONE HYDROCHLORIDE 5 MG: 5 TABLET ORAL at 22:04

## 2022-01-15 RX ADMIN — GUAIFENESIN 600 MG: 600 TABLET, EXTENDED RELEASE ORAL at 22:04

## 2022-01-15 RX ADMIN — PIPERACILLIN SODIUM AND TAZOBACTAM SODIUM 3.38 G: 3; .375 INJECTION, POWDER, LYOPHILIZED, FOR SOLUTION INTRAVENOUS at 11:44

## 2022-01-15 RX ADMIN — OXCARBAZEPINE 300 MG: 150 TABLET, FILM COATED ORAL at 22:00

## 2022-01-15 RX ADMIN — OXYCODONE HYDROCHLORIDE 5 MG: 5 TABLET ORAL at 05:24

## 2022-01-15 RX ADMIN — ONDANSETRON 4 MG: 2 INJECTION INTRAMUSCULAR; INTRAVENOUS at 22:11

## 2022-01-15 RX ADMIN — BUSPIRONE HYDROCHLORIDE 7.5 MG: 5 TABLET ORAL at 08:50

## 2022-01-15 RX ADMIN — CYCLOBENZAPRINE 10 MG: 10 TABLET, FILM COATED ORAL at 08:50

## 2022-01-15 RX ADMIN — PIPERACILLIN SODIUM AND TAZOBACTAM SODIUM 3.38 G: 3; .375 INJECTION, POWDER, LYOPHILIZED, FOR SOLUTION INTRAVENOUS at 18:24

## 2022-01-15 RX ADMIN — QUETIAPINE FUMARATE 100 MG: 100 TABLET ORAL at 08:50

## 2022-01-15 RX ADMIN — SODIUM CHLORIDE, PRESERVATIVE FREE 20 MG: 5 INJECTION INTRAVENOUS at 22:09

## 2022-01-15 RX ADMIN — GUAIFENESIN 600 MG: 600 TABLET, EXTENDED RELEASE ORAL at 08:50

## 2022-01-15 RX ADMIN — POTASSIUM CHLORIDE, DEXTROSE MONOHYDRATE AND SODIUM CHLORIDE 125 ML/HR: 150; 5; 450 INJECTION, SOLUTION INTRAVENOUS at 22:03

## 2022-01-15 RX ADMIN — SODIUM CHLORIDE, PRESERVATIVE FREE 20 MG: 5 INJECTION INTRAVENOUS at 08:50

## 2022-01-15 RX ADMIN — POTASSIUM CHLORIDE, DEXTROSE MONOHYDRATE AND SODIUM CHLORIDE 125 ML/HR: 150; 5; 450 INJECTION, SOLUTION INTRAVENOUS at 05:57

## 2022-01-15 RX ADMIN — BUPROPION HYDROCHLORIDE 150 MG: 150 TABLET, FILM COATED, EXTENDED RELEASE ORAL at 08:50

## 2022-01-15 RX ADMIN — BUSPIRONE HYDROCHLORIDE 7.5 MG: 5 TABLET ORAL at 22:04

## 2022-01-15 RX ADMIN — CYCLOBENZAPRINE 10 MG: 10 TABLET, FILM COATED ORAL at 18:31

## 2022-01-15 RX ADMIN — OXYCODONE HYDROCHLORIDE 5 MG: 5 TABLET ORAL at 11:43

## 2022-01-15 RX ADMIN — PIPERACILLIN SODIUM AND TAZOBACTAM SODIUM 3.38 G: 3; .375 INJECTION, POWDER, LYOPHILIZED, FOR SOLUTION INTRAVENOUS at 05:57

## 2022-01-15 NOTE — PROGRESS NOTES
PHYSICAL THERAPY TREATMENT  Patient: Laina Huntley (53 y.o. female)  Date: 1/15/2022  Diagnosis: Cancer of sigmoid colon ([facility] [address] [C18.7]  Metastatic colon cancer to liver ([facility] [address] [C18.9, C78.7] <principal problem not specified>  Procedure(s) (LRB):  EXPLORATORY LAPAROTOMY, SIGMOID COLECTOMY, LOW ANTERIOR RESECTION, APPENDECTOMY, LIVER METASTECTOMY, CYSTOURETHROSCOPY, BILATERAL URETERAL STENT PLACEMENT; (N/A)  CYSTOURETHROSCOPY, BILATERAL URETERAL STENT PLACEMENT (Bilateral) 5 Days Post-Op  Precautions:    Chart, physical therapy assessment, plan of care and goals were reviewed. ASSESSMENT  Patient continues with skilled PT services and is progressing towards goals. Pt. Semi supine in bed upon arrival and agreeable to therapy session. Session initiated with bed mobility and transfer with supervision. Pt. Able to ambulate 5' x2 trials with antalgic gait. Sitting balance tolerance 5 minutes with abdomen pain 8/10. Seated HEP performed with ankle pump, laq, march, hip abd. Recommend DC to SNF. .     Current Level of Function Impacting Discharge (mobility/balance): PAIN    Other factors to consider for discharge: PMH         PLAN :  Patient continues to benefit from skilled intervention to address the above impairments. Continue treatment per established plan of care. to address goals. Recommendation for discharge: (in order for the patient to meet his/her long term goals)  López Nieto    This discharge recommendation:  Has been made in collaboration with the attending provider and/or case management    IF patient discharges home will need the following DME: rolling walker       SUBJECTIVE:   Patient stated IM OKAY.     OBJECTIVE DATA SUMMARY:   Critical Behavior:  Neurologic State: Alert  Orientation Level: Oriented X4        Functional Mobility Training:  Bed Mobility:  Rolling: Supervision  Supine to Sit: Supervision  Sit to Supine: Supervision  Scooting: Supervision        Transfers:  Sit to Stand: Supervision  Stand to Sit: Supervision  Stand Pivot Transfers: Supervision                          Balance:  Sitting: Intact  Sitting - Static: Good (unsupported)  Sitting - Dynamic: Good (unsupported)  Standing: Intact  Standing - Static: Good  Standing - Dynamic : Good  Ambulation/Gait Training:  Distance (ft): 10 Feet (ft)  Assistive Device: Walker, rolling  Ambulation - Level of Assistance: Stand-by assistance                       Speed/Lynda: Slow  Step Length: Left shortened;Right shortened                    Stairs: Therapeutic Exercises:   Therapeutic Exercises:       EXERCISE   Sets   Reps   Active Active Assist   Passive Self ROM   Comments   Ankle Pumps  10 [] [] [] []    Quad Sets/Glut Sets   [] [] [] []    Hamstring Sets   [] [] [] []    Short Arc Quads   [] [] [] []    Heel Slides   [] [] [] []    Straight Leg Raises   [] [] [] []    Hip abd/add  10 [x] [] [] []    Long Arc Quads  10 [x] [] [] []    Marching  10 [x] [] [] []       [] [] [] []        Pain Ratin    Activity Tolerance:   Fair  Please refer to the flowsheet for vital signs taken during this treatment. After treatment patient left in no apparent distress:   Supine in bed    COMMUNICATION/COLLABORATION:   The patients plan of care was discussed with: Physical therapy assistant.      Stephany Dinh PTA   Time Calculation: 15 mins

## 2022-01-15 NOTE — PROGRESS NOTES
Progress Note    Patient: Kyle Khan MRN: [id number]  SSN: [id number]    YOB: 1965  Age: 49 y.o. Sex: female      Admit Date: 1/10/2022    LOS: 5 days     Subjective:   Postoperative day 5 status post exploratory laparotomy, sigmoid colectomy, hepatic metastectomy for adenocarcinoma of the sigmoid colon  Patient seen in bed  Reports pain is improved  Denies flatus or bowel movement    Objective:     Vitals:    01/14/22 1941 01/14/22 2349 01/15/22 0240 01/15/22 0742   BP: 118/76 127/77 136/79 139/80   Pulse: 75 78 79 66   Resp: 18 20 18 18   Temp: 99 °F (37.2 °C) 98 °F (36.7 °C) 97.7 °F (36.5 °C) 97.9 °F (36.6 °C)   SpO2: 98% 100% 99% 97%   Weight:       Height:            Intake and Output:  Current Shift: No intake/output data recorded. Last three shifts: 01/13 1901 - 01/15 0700  In: 1360 [P.O.:60; I.V.:1300]  Out: 725 [Urine:725]    Review of Systems:  ROS     Physical Exam:   Abdomen soft, appropriately tender, nondistended, midline incisions clean and intact    Lab/Data Review:  No results found for this or any previous visit (from the past 24 hour(s)).          Assessment:     Active Problems:    Cancer of sigmoid colon ([facility] [address] (1/10/2022)      Metastatic colon cancer to liver ([facility] [address] (1/10/2022)        Plan:   Continue sips of water ice chips only awaiting return of bowel function  Out of bed encourage ambulation  Labs in a.m. tomorrow  Patient complaining of what appears to be eczematoid skin lesions, will prescribe triamcinolone cream    Signed By: Jc Reyes MD     January 15, 2022

## 2022-01-16 PROCEDURE — 74011250636 HC RX REV CODE- 250/636: Performed by: COLON & RECTAL SURGERY

## 2022-01-16 PROCEDURE — 74011000250 HC RX REV CODE- 250: Performed by: COLON & RECTAL SURGERY

## 2022-01-16 PROCEDURE — 99024 POSTOP FOLLOW-UP VISIT: CPT | Performed by: SURGERY

## 2022-01-16 PROCEDURE — 74011250637 HC RX REV CODE- 250/637: Performed by: COLON & RECTAL SURGERY

## 2022-01-16 PROCEDURE — 65270000029 HC RM PRIVATE

## 2022-01-16 PROCEDURE — 74011000258 HC RX REV CODE- 258: Performed by: COLON & RECTAL SURGERY

## 2022-01-16 RX ORDER — OXYCODONE HYDROCHLORIDE 5 MG/1
5 TABLET ORAL
Status: DISCONTINUED | OUTPATIENT
Start: 2022-01-16 | End: 2022-01-16

## 2022-01-16 RX ORDER — KETOROLAC TROMETHAMINE 15 MG/ML
15 INJECTION, SOLUTION INTRAMUSCULAR; INTRAVENOUS
Status: DISPENSED | OUTPATIENT
Start: 2022-01-16 | End: 2022-01-19

## 2022-01-16 RX ORDER — OXYCODONE HYDROCHLORIDE 10 MG/1
10 TABLET ORAL
Status: DISCONTINUED | OUTPATIENT
Start: 2022-01-16 | End: 2022-01-21 | Stop reason: HOSPADM

## 2022-01-16 RX ADMIN — QUETIAPINE FUMARATE 100 MG: 100 TABLET ORAL at 09:49

## 2022-01-16 RX ADMIN — PIPERACILLIN SODIUM AND TAZOBACTAM SODIUM 3.38 G: 3; .375 INJECTION, POWDER, LYOPHILIZED, FOR SOLUTION INTRAVENOUS at 17:42

## 2022-01-16 RX ADMIN — SODIUM CHLORIDE, PRESERVATIVE FREE 20 MG: 5 INJECTION INTRAVENOUS at 21:33

## 2022-01-16 RX ADMIN — POTASSIUM CHLORIDE, DEXTROSE MONOHYDRATE AND SODIUM CHLORIDE 125 ML/HR: 150; 5; 450 INJECTION, SOLUTION INTRAVENOUS at 17:42

## 2022-01-16 RX ADMIN — PIPERACILLIN SODIUM AND TAZOBACTAM SODIUM 3.38 G: 3; .375 INJECTION, POWDER, LYOPHILIZED, FOR SOLUTION INTRAVENOUS at 06:02

## 2022-01-16 RX ADMIN — SODIUM CHLORIDE, PRESERVATIVE FREE 20 MG: 5 INJECTION INTRAVENOUS at 09:48

## 2022-01-16 RX ADMIN — PIPERACILLIN SODIUM AND TAZOBACTAM SODIUM 3.38 G: 3; .375 INJECTION, POWDER, LYOPHILIZED, FOR SOLUTION INTRAVENOUS at 12:27

## 2022-01-16 RX ADMIN — BUPROPION HYDROCHLORIDE 150 MG: 150 TABLET, FILM COATED, EXTENDED RELEASE ORAL at 09:49

## 2022-01-16 RX ADMIN — TRIAMCINOLONE ACETONIDE: 1 CREAM TOPICAL at 21:40

## 2022-01-16 RX ADMIN — OXCARBAZEPINE 300 MG: 150 TABLET, FILM COATED ORAL at 21:33

## 2022-01-16 RX ADMIN — OXYCODONE HYDROCHLORIDE 5 MG: 5 TABLET ORAL at 12:31

## 2022-01-16 RX ADMIN — TRIAMCINOLONE ACETONIDE: 1 CREAM TOPICAL at 09:50

## 2022-01-16 RX ADMIN — PIPERACILLIN SODIUM AND TAZOBACTAM SODIUM 3.38 G: 3; .375 INJECTION, POWDER, LYOPHILIZED, FOR SOLUTION INTRAVENOUS at 00:58

## 2022-01-16 RX ADMIN — BUSPIRONE HYDROCHLORIDE 7.5 MG: 5 TABLET ORAL at 09:48

## 2022-01-16 RX ADMIN — POTASSIUM CHLORIDE, DEXTROSE MONOHYDRATE AND SODIUM CHLORIDE 125 ML/HR: 150; 5; 450 INJECTION, SOLUTION INTRAVENOUS at 06:05

## 2022-01-16 RX ADMIN — GUAIFENESIN 600 MG: 600 TABLET, EXTENDED RELEASE ORAL at 21:33

## 2022-01-16 RX ADMIN — GUAIFENESIN 600 MG: 600 TABLET, EXTENDED RELEASE ORAL at 09:49

## 2022-01-16 RX ADMIN — BUSPIRONE HYDROCHLORIDE 7.5 MG: 5 TABLET ORAL at 21:33

## 2022-01-16 NOTE — PROGRESS NOTES
Bedside and Verbal shift change report given to Evelyn Lindsey RN (oncoming nurse) by Belva Apgar, RN (offgoing nurse). Report included the following information SBAR, Kardex, Intake/Output, MAR, Recent Results, Med Rec Status and Cardiac Rhythm NSR.

## 2022-01-16 NOTE — PROGRESS NOTES
Progress Note    Patient: Sridevi Schwartz MRN: [id number]  SSN: [id number]    YOB: 1965  Age: 49 y.o. Sex: female      Admit Date: 1/10/2022    LOS: 6 days     Subjective:   Postoperative day 6 status post exploratory laparotomy, sigmoid colectomy, hepatic metastectomy for adenocarcinoma of the sigmoid colon  Patient seen in bed, but has been ambulating well. Reports increased pain intermittently and feels that she needs increased dose. Denies flatus or bowel movement, but endorses belching. Objective:     Vitals:    01/15/22 0742 01/15/22 1643 01/15/22 2131 01/16/22 0235   BP: 139/80 136/86 (!) 143/87 (!) 155/86   Pulse: 66 80 75    Resp: 18 18 18 16   Temp: 97.9 °F (36.6 °C) 98.2 °F (36.8 °C) 98.6 °F (37 °C) 98.3 °F (36.8 °C)   SpO2: 97% 100% 98% 97%   Weight:       Height:            Intake and Output:  Current Shift: No intake/output data recorded. Last three shifts: 01/14 1901 - 01/16 0700  In: 1100 [I.V.:1100]  Out: 300 [Urine:300]    Review of Systems:  ROS     Physical Exam:   Abdomen soft, appropriately tender, nondistended, midline incisions clean and intact    Lab/Data Review:  Recent Results (from the past 24 hour(s))   CBC WITH AUTOMATED DIFF    Collection Time: 01/15/22  1:19 PM   Result Value Ref Range    WBC 6.5 3.6 - 11.0 K/uL    RBC 4.06 3.80 - 5.20 M/uL    HGB 8.3 (L) 11.5 - 16.0 g/dL    HCT 28.2 (L) 35.0 - 47.0 %    MCV 69.5 (L) 80.0 - 99.0 FL    MCH 20.4 (L) 26.0 - 34.0 PG    MCHC 29.4 (L) 30.0 - 36.5 g/dL    RDW 31.4 (H) 11.5 - 14.5 %    PLATELET 651 (H) 108 - 400 K/uL    MPV 8.4 (L) 8.9 - 12.9 FL    NRBC 0.0 0.0  WBC    ABSOLUTE NRBC 0.00 0.00 - 0.01 K/uL    NEUTROPHILS 53 32 - 75 %    LYMPHOCYTES 26 12 - 49 %    MONOCYTES 7 5 - 13 %    EOSINOPHILS 12 (H) 0 - 7 %    BASOPHILS 1 0 - 1 %    IMMATURE GRANULOCYTES 1 (H) 0 - 0.5 %    ABS. NEUTROPHILS 3.4 1.8 - 8.0 K/UL    ABS. LYMPHOCYTES 1.7 0.8 - 3.5 K/UL    ABS. MONOCYTES 0.5 0.0 - 1.0 K/UL    ABS. EOSINOPHILS 0.8 (H) 0.0 - 0.4 K/UL    ABS. BASOPHILS 0.0 0.0 - 0.1 K/UL    ABS. IMM. GRANS. 0.0 0.00 - 0.04 K/UL    DF AUTOMATED     METABOLIC PANEL, COMPREHENSIVE    Collection Time: 01/15/22  1:19 PM   Result Value Ref Range    Sodium 140 136 - 145 mmol/L    Potassium 4.0 3.5 - 5.1 mmol/L    Chloride 108 97 - 108 mmol/L    CO2 27 21 - 32 mmol/L    Anion gap 5 5 - 15 mmol/L    Glucose 107 (H) 65 - 100 mg/dL    BUN 2 (L) 6 - 20 mg/dL    Creatinine 0.63 0.55 - 1.02 mg/dL    BUN/Creatinine ratio 3 (L) 12 - 20      GFR est AA >60 >60 ml/min/1.73m2    GFR est non-AA >60 >60 ml/min/1.73m2    Calcium 8.5 8.5 - 10.1 mg/dL    Bilirubin, total 0.4 0.2 - 1.0 mg/dL    AST (SGOT) 20 15 - 37 U/L    ALT (SGPT) 20 12 - 78 U/L    Alk.  phosphatase 84 45 - 117 U/L    Protein, total 6.0 (L) 6.4 - 8.2 g/dL    Albumin 2.0 (L) 3.5 - 5.0 g/dL    Globulin 4.0 2.0 - 4.0 g/dL    A-G Ratio 0.5 (L) 1.1 - 2.2              Assessment:     Active Problems:    Cancer of sigmoid colon ([facility] (1/10/2022)      Metastatic colon cancer [facility] liver ([facility] (1/10/2022)        Plan:   Continue sips of water ice chips only awaiting return of bowel function  Out of bed encourage ambulation  Labs in a.m. tomorrow  Patient complaining of what appears to be eczematoid skin lesions, will prescribe triamcinolone cream  Start toradol 15 mg IV Q6H prn pain  Increased oxycodone to 10 mg PO Q4H prn pain    Signed By: Loulou Brandt     January 16, 2022

## 2022-01-16 NOTE — PROGRESS NOTES
Patient had Zosyn ordered for 200 ml/hr and Q6. I called pharmacy to change it to 25 ml/hr and Q8. Pharmacist changed the order but wanted to know if the medication could be discontinued because of the indication for pre-surgical prophylaxis. I called Dr. Nils Lopez and she said it may have been put on for post-surgical prophylaxis. I will notify the nurse to contact Dr. Ya Molina in the morning about continuing zosyn.

## 2022-01-16 NOTE — PROGRESS NOTES
Problem: Falls - Risk of  Goal: *Absence of Falls  Description: Document Kerwin Hancock Fall Risk and appropriate interventions in the flowsheet. Outcome: Progressing Towards Goal  Note: Fall Risk Interventions:  Mobility Interventions: PT Consult for assist device competence,PT Consult for mobility concerns,OT consult for ADLs,Patient to call before getting OOB         Medication Interventions: Bed/chair exit alarm,Patient to call before getting OOB    Elimination Interventions: Call light in reach,Bed/chair exit alarm              Problem: Patient Education: Go to Patient Education Activity  Goal: Patient/Family Education  Outcome: Progressing Towards Goal     Problem: Patient Education: Go to Patient Education Activity  Goal: Patient/Family Education  Outcome: Progressing Towards Goal     Problem: Pressure Injury - Risk of  Goal: *Prevention of pressure injury  Description: Document Miles Scale and appropriate interventions in the flowsheet.   Outcome: Progressing Towards Goal  Note: Pressure Injury Interventions:  Sensory Interventions: Assess changes in LOC,Check visual cues for pain,Minimize linen layers,Maintain/enhance activity level,Keep linens dry and wrinkle-free    Moisture Interventions: Absorbent underpads,Maintain skin hydration (lotion/cream),Minimize layers    Activity Interventions: Increase time out of bed,PT/OT evaluation    Mobility Interventions: PT/OT evaluation    Nutrition Interventions: Document food/fluid/supplement intake    Friction and Shear Interventions: Minimize layers,HOB 30 degrees or less                Problem: Patient Education: Go to Patient Education Activity  Goal: Patient/Family Education  Outcome: Progressing Towards Goal     Problem: Patient Education: Go to Patient Education Activity  Goal: Patient/Family Education  Outcome: Progressing Towards Goal

## 2022-01-16 NOTE — PROGRESS NOTES
Progress Note    Patient: Navya Cespedes MRN: [id number]  SSN: [id number]    YOB: 1965  Age: 49 y.o. Sex: female      Admit Date: 1/10/2022    LOS: 6 days     Subjective:   Postoperative day 6 status post exploratory laparotomy, sigmoid colectomy, hepatic metastectomy for adenocarcinoma of the sigmoid colon  Patient ambulating and getting into chair independently. Reports increased pain intermittently, requesting alteration to pain medication regimen. Denies flatus or bowel movement. Objective:     Vitals:    01/15/22 1643 01/15/22 2131 01/16/22 0235 01/16/22 0807   BP: 136/86 (!) 143/87 (!) 155/86 112/74   Pulse: 80 75  81   Resp: 18 18 16 14   Temp: 98.2 °F (36.8 °C) 98.6 °F (37 °C) 98.3 °F (36.8 °C) 97.9 °F (36.6 °C)   SpO2: 100% 98% 97% 97%   Weight:       Height:            Intake and Output:  Current Shift: No intake/output data recorded. Last three shifts: 01/14 1901 - 01/16 0700  In: 1100 [I.V.:1100]  Out: 300 [Urine:300]        Physical Exam:   Abdomen soft, appropriately tender, nondistended, midline incisions clean and intact, abdominal binder in place    Lab/Data Review:  No results found for this or any previous visit (from the past 24 hour(s)).          Assessment:     Active Problems:    Cancer of sigmoid colon ([facility] (1/10/2022)      Metastatic colon cancer to liver ([facility] (1/10/2022)        Plan:   Continue sips of water ice chips only- awaiting return of bowel function  Out of bed, encourage ambulation  Labs in a.m. tomorrow  Triamcinolone cream for skin lesions  Start toradol 15 mg IV Q6H prn pain  Oxy 5 PRN moderate pain, oxy 10 PRN severe pain    Signed By: Chandra Pompa DO     January 16, 2022

## 2022-01-17 LAB
ANION GAP SERPL CALC-SCNC: 4 MMOL/L (ref 5–15)
BUN SERPL-MCNC: 2 MG/DL (ref 6–20)
BUN/CREAT SERPL: 3 (ref 12–20)
CA-I BLD-MCNC: 8.5 MG/DL (ref 8.5–10.1)
CHLORIDE SERPL-SCNC: 108 MMOL/L (ref 97–108)
CO2 SERPL-SCNC: 26 MMOL/L (ref 21–32)
CREAT SERPL-MCNC: 0.63 MG/DL (ref 0.55–1.02)
ERYTHROCYTE [DISTWIDTH] IN BLOOD BY AUTOMATED COUNT: 31.4 % (ref 11.5–14.5)
GLUCOSE SERPL-MCNC: 95 MG/DL (ref 65–100)
HCT VFR BLD AUTO: 27.5 % (ref 35–47)
HGB BLD-MCNC: 8.2 G/DL (ref 11.5–16)
MAGNESIUM SERPL-MCNC: 2 MG/DL (ref 1.6–2.4)
MCH RBC QN AUTO: 20.7 PG (ref 26–34)
MCHC RBC AUTO-ENTMCNC: 29.8 G/DL (ref 30–36.5)
MCV RBC AUTO: 69.4 FL (ref 80–99)
NRBC # BLD: 0 K/UL (ref 0–0.01)
NRBC BLD-RTO: 0 PER 100 WBC
PLATELET # BLD AUTO: 413 K/UL (ref 150–400)
PMV BLD AUTO: 8.7 FL (ref 8.9–12.9)
POTASSIUM SERPL-SCNC: 3.7 MMOL/L (ref 3.5–5.1)
RBC # BLD AUTO: 3.96 M/UL (ref 3.8–5.2)
SODIUM SERPL-SCNC: 138 MMOL/L (ref 136–145)
WBC # BLD AUTO: 5.4 K/UL (ref 3.6–11)

## 2022-01-17 PROCEDURE — 83735 ASSAY OF MAGNESIUM: CPT

## 2022-01-17 PROCEDURE — 74011250636 HC RX REV CODE- 250/636: Performed by: COLON & RECTAL SURGERY

## 2022-01-17 PROCEDURE — 85027 COMPLETE CBC AUTOMATED: CPT

## 2022-01-17 PROCEDURE — 74011000250 HC RX REV CODE- 250: Performed by: COLON & RECTAL SURGERY

## 2022-01-17 PROCEDURE — 80048 BASIC METABOLIC PNL TOTAL CA: CPT

## 2022-01-17 PROCEDURE — 74011000258 HC RX REV CODE- 258: Performed by: COLON & RECTAL SURGERY

## 2022-01-17 PROCEDURE — 65270000029 HC RM PRIVATE

## 2022-01-17 PROCEDURE — 99024 POSTOP FOLLOW-UP VISIT: CPT | Performed by: COLON & RECTAL SURGERY

## 2022-01-17 PROCEDURE — 74011250637 HC RX REV CODE- 250/637: Performed by: COLON & RECTAL SURGERY

## 2022-01-17 PROCEDURE — 36415 COLL VENOUS BLD VENIPUNCTURE: CPT

## 2022-01-17 PROCEDURE — 74011250636 HC RX REV CODE- 250/636: Performed by: SURGERY

## 2022-01-17 PROCEDURE — 74011250637 HC RX REV CODE- 250/637: Performed by: SURGERY

## 2022-01-17 RX ORDER — QUETIAPINE FUMARATE 100 MG/1
100 TABLET, FILM COATED ORAL DAILY
Status: DISCONTINUED | OUTPATIENT
Start: 2022-01-18 | End: 2022-01-21 | Stop reason: HOSPADM

## 2022-01-17 RX ORDER — ENOXAPARIN SODIUM 100 MG/ML
40 INJECTION SUBCUTANEOUS EVERY 24 HOURS
Status: DISCONTINUED | OUTPATIENT
Start: 2022-01-17 | End: 2022-01-21 | Stop reason: HOSPADM

## 2022-01-17 RX ADMIN — BUPROPION HYDROCHLORIDE 150 MG: 150 TABLET, FILM COATED, EXTENDED RELEASE ORAL at 09:00

## 2022-01-17 RX ADMIN — KETOROLAC TROMETHAMINE 15 MG: 15 INJECTION, SOLUTION INTRAMUSCULAR; INTRAVENOUS at 03:10

## 2022-01-17 RX ADMIN — PIPERACILLIN AND TAZOBACTAM 3.38 G: 3; .375 INJECTION, POWDER, LYOPHILIZED, FOR SOLUTION INTRAVENOUS at 03:10

## 2022-01-17 RX ADMIN — BUSPIRONE HYDROCHLORIDE 7.5 MG: 5 TABLET ORAL at 10:22

## 2022-01-17 RX ADMIN — OXYCODONE HYDROCHLORIDE 10 MG: 10 TABLET ORAL at 14:25

## 2022-01-17 RX ADMIN — POTASSIUM CHLORIDE, DEXTROSE MONOHYDRATE AND SODIUM CHLORIDE 125 ML/HR: 150; 5; 450 INJECTION, SOLUTION INTRAVENOUS at 20:41

## 2022-01-17 RX ADMIN — BUSPIRONE HYDROCHLORIDE 7.5 MG: 5 TABLET ORAL at 20:40

## 2022-01-17 RX ADMIN — QUETIAPINE FUMARATE 100 MG: 100 TABLET ORAL at 10:23

## 2022-01-17 RX ADMIN — OXCARBAZEPINE 300 MG: 150 TABLET, FILM COATED ORAL at 20:40

## 2022-01-17 RX ADMIN — POTASSIUM CHLORIDE, DEXTROSE MONOHYDRATE AND SODIUM CHLORIDE 125 ML/HR: 150; 5; 450 INJECTION, SOLUTION INTRAVENOUS at 13:16

## 2022-01-17 RX ADMIN — ENOXAPARIN SODIUM 40 MG: 100 INJECTION SUBCUTANEOUS at 16:57

## 2022-01-17 RX ADMIN — CYCLOBENZAPRINE 10 MG: 10 TABLET, FILM COATED ORAL at 20:40

## 2022-01-17 RX ADMIN — SODIUM CHLORIDE, PRESERVATIVE FREE 20 MG: 5 INJECTION INTRAVENOUS at 10:23

## 2022-01-17 RX ADMIN — PIPERACILLIN AND TAZOBACTAM 3.38 G: 3; .375 INJECTION, POWDER, LYOPHILIZED, FOR SOLUTION INTRAVENOUS at 10:23

## 2022-01-17 RX ADMIN — TRIAMCINOLONE ACETONIDE: 1 CREAM TOPICAL at 20:43

## 2022-01-17 RX ADMIN — SODIUM CHLORIDE, PRESERVATIVE FREE 20 MG: 5 INJECTION INTRAVENOUS at 20:41

## 2022-01-17 RX ADMIN — CYCLOBENZAPRINE 10 MG: 10 TABLET, FILM COATED ORAL at 11:33

## 2022-01-17 RX ADMIN — TRIAMCINOLONE ACETONIDE: 1 CREAM TOPICAL at 10:30

## 2022-01-17 RX ADMIN — KETOROLAC TROMETHAMINE 15 MG: 15 INJECTION, SOLUTION INTRAMUSCULAR; INTRAVENOUS at 10:33

## 2022-01-17 RX ADMIN — OXYCODONE HYDROCHLORIDE 10 MG: 10 TABLET ORAL at 20:40

## 2022-01-17 RX ADMIN — GUAIFENESIN 600 MG: 600 TABLET, EXTENDED RELEASE ORAL at 20:40

## 2022-01-17 RX ADMIN — GUAIFENESIN 600 MG: 600 TABLET, EXTENDED RELEASE ORAL at 10:23

## 2022-01-17 NOTE — PROGRESS NOTES
Problem: Falls - Risk of  Goal: *Absence of Falls  Description: Document Marium Perez Fall Risk and appropriate interventions in the flowsheet. Outcome: Progressing Towards Goal  Note: Fall Risk Interventions:  Mobility Interventions: Patient to call before getting OOB         Medication Interventions: Patient to call before getting OOB    Elimination Interventions: Call light in reach              Problem: Patient Education: Go to Patient Education Activity  Goal: Patient/Family Education  Outcome: Progressing Towards Goal     Problem: Patient Education: Go to Patient Education Activity  Goal: Patient/Family Education  Outcome: Progressing Towards Goal     Problem: Pressure Injury - Risk of  Goal: *Prevention of pressure injury  Description: Document Miles Scale and appropriate interventions in the flowsheet.   Outcome: Progressing Towards Goal  Note: Pressure Injury Interventions:  Sensory Interventions: Assess changes in LOC    Moisture Interventions: Absorbent underpads    Activity Interventions: Increase time out of bed    Mobility Interventions: HOB 30 degrees or less    Nutrition Interventions: Document food/fluid/supplement intake    Friction and Shear Interventions: Minimize layers                Problem: Patient Education: Go to Patient Education Activity  Goal: Patient/Family Education  Outcome: Progressing Towards Goal     Problem: Patient Education: Go to Patient Education Activity  Goal: Patient/Family Education  Outcome: Progressing Towards Goal

## 2022-01-17 NOTE — PROGRESS NOTES
CM reviewed patient record. Ambulation encouraged per MD note and she is to have her diet advanced. Patient is to go to SNF at [place]. Awaiting acceptance. Clinicals uploaded in [facility]. CM team continue to follow patient's progress.      Lindsay Israel

## 2022-01-17 NOTE — PROGRESS NOTES
Progress Note    Patient: Christina Velasquez MRN: [id number]  SSN: [id number]    YOB: 1965  Age: 49 y.o. Sex: female      Admit Date: 1/10/2022    LOS: 7 days     Subjective:   Postoperative day 7  Patient seen in bed  Reports she had a bowel movement    Objective:     Vitals:    01/16/22 2130 01/17/22 0310 01/17/22 0622 01/17/22 1453   BP: 111/72 115/84 114/82 (!) 141/91   Pulse: 82 75 82 83   Resp: 16 16  16   Temp: 98.3 °F (36.8 °C) 98.7 °F (37.1 °C) 98.2 °F (36.8 °C) 97.9 °F (36.6 °C)   SpO2: 99%  99% 100%   Weight:       Height:            Intake and Output:  Current Shift: No intake/output data recorded.   Last three shifts: 01/15 1901 - 01/17 0700  In: 3050.8 [P.O.:30; I.V.:3020.8]  Out: -     Review of Systems:  ROS     Physical Exam:   Abdomen is soft, appropriately tender, nondistended, midline incision clean and intact    Lab/Data Review:  Recent Results (from the past 24 hour(s))   METABOLIC PANEL, BASIC    Collection Time: 01/17/22  6:46 AM   Result Value Ref Range    Sodium 138 136 - 145 mmol/L    Potassium 3.7 3.5 - 5.1 mmol/L    Chloride 108 97 - 108 mmol/L    CO2 26 21 - 32 mmol/L    Anion gap 4 (L) 5 - 15 mmol/L    Glucose 95 65 - 100 mg/dL    BUN 2 (L) 6 - 20 mg/dL    Creatinine 0.63 0.55 - 1.02 mg/dL    BUN/Creatinine ratio 3 (L) 12 - 20      GFR est AA >60 >60 ml/min/1.73m2    GFR est non-AA >60 >60 ml/min/1.73m2    Calcium 8.5 8.5 - 10.1 mg/dL   CBC W/O DIFF    Collection Time: 01/17/22  6:46 AM   Result Value Ref Range    WBC 5.4 3.6 - 11.0 K/uL    RBC 3.96 3.80 - 5.20 M/uL    HGB 8.2 (L) 11.5 - 16.0 g/dL    HCT 27.5 (L) 35.0 - 47.0 %    MCV 69.4 (L) 80.0 - 99.0 FL    MCH 20.7 (L) 26.0 - 34.0 PG    MCHC 29.8 (L) 30.0 - 36.5 g/dL    RDW 31.4 (H) 11.5 - 14.5 %    PLATELET 552 (H) 393 - 400 K/uL    MPV 8.7 (L) 8.9 - 12.9 FL    NRBC 0.0 0.0  WBC    ABSOLUTE NRBC 0.00 0.00 - 0.01 K/uL   MAGNESIUM    Collection Time: 01/17/22  6:46 AM   Result Value Ref Range    Magnesium 2.0 1.6 - 2.4 mg/dL            Assessment:     Active Problems:    Cancer of sigmoid colon ([facility] (1/10/2022)      Metastatic colon cancer to liver [facility]) (1/10/2022)        Plan:   Doing well  Advance to full liquid diet  Continue to encourage ambulation  Continue Lovenox for DVT prophylaxis    Signed By: Marleni Euceda MD     January 17, 2022

## 2022-01-18 PROCEDURE — 97116 GAIT TRAINING THERAPY: CPT

## 2022-01-18 PROCEDURE — 74011250636 HC RX REV CODE- 250/636: Performed by: SURGERY

## 2022-01-18 PROCEDURE — 74011250637 HC RX REV CODE- 250/637: Performed by: SURGERY

## 2022-01-18 PROCEDURE — 65270000029 HC RM PRIVATE

## 2022-01-18 PROCEDURE — 74011250637 HC RX REV CODE- 250/637: Performed by: COLON & RECTAL SURGERY

## 2022-01-18 PROCEDURE — 74011000250 HC RX REV CODE- 250: Performed by: COLON & RECTAL SURGERY

## 2022-01-18 PROCEDURE — 97530 THERAPEUTIC ACTIVITIES: CPT

## 2022-01-18 PROCEDURE — 99024 POSTOP FOLLOW-UP VISIT: CPT | Performed by: COLON & RECTAL SURGERY

## 2022-01-18 PROCEDURE — 74011250636 HC RX REV CODE- 250/636: Performed by: COLON & RECTAL SURGERY

## 2022-01-18 RX ADMIN — BUSPIRONE HYDROCHLORIDE 7.5 MG: 5 TABLET ORAL at 09:03

## 2022-01-18 RX ADMIN — GUAIFENESIN 600 MG: 600 TABLET, EXTENDED RELEASE ORAL at 20:02

## 2022-01-18 RX ADMIN — POTASSIUM CHLORIDE, DEXTROSE MONOHYDRATE AND SODIUM CHLORIDE 125 ML/HR: 150; 5; 450 INJECTION, SOLUTION INTRAVENOUS at 14:29

## 2022-01-18 RX ADMIN — CYCLOBENZAPRINE 10 MG: 10 TABLET, FILM COATED ORAL at 09:01

## 2022-01-18 RX ADMIN — BUPROPION HYDROCHLORIDE 150 MG: 150 TABLET, FILM COATED, EXTENDED RELEASE ORAL at 09:02

## 2022-01-18 RX ADMIN — SODIUM CHLORIDE, PRESERVATIVE FREE 20 MG: 5 INJECTION INTRAVENOUS at 09:02

## 2022-01-18 RX ADMIN — ENOXAPARIN SODIUM 40 MG: 100 INJECTION SUBCUTANEOUS at 16:01

## 2022-01-18 RX ADMIN — SODIUM CHLORIDE, PRESERVATIVE FREE 20 MG: 5 INJECTION INTRAVENOUS at 20:02

## 2022-01-18 RX ADMIN — KETOROLAC TROMETHAMINE 15 MG: 15 INJECTION, SOLUTION INTRAMUSCULAR; INTRAVENOUS at 21:26

## 2022-01-18 RX ADMIN — OXYCODONE HYDROCHLORIDE 10 MG: 10 TABLET ORAL at 20:02

## 2022-01-18 RX ADMIN — OXCARBAZEPINE 300 MG: 150 TABLET, FILM COATED ORAL at 20:11

## 2022-01-18 RX ADMIN — GUAIFENESIN 600 MG: 600 TABLET, EXTENDED RELEASE ORAL at 09:02

## 2022-01-18 RX ADMIN — CYCLOBENZAPRINE 10 MG: 10 TABLET, FILM COATED ORAL at 20:02

## 2022-01-18 RX ADMIN — OXYCODONE HYDROCHLORIDE 10 MG: 10 TABLET ORAL at 16:04

## 2022-01-18 RX ADMIN — BUSPIRONE HYDROCHLORIDE 7.5 MG: 5 TABLET ORAL at 20:02

## 2022-01-18 RX ADMIN — OXYCODONE HYDROCHLORIDE 10 MG: 10 TABLET ORAL at 10:27

## 2022-01-18 RX ADMIN — TRIAMCINOLONE ACETONIDE: 1 CREAM TOPICAL at 09:00

## 2022-01-18 RX ADMIN — QUETIAPINE FUMARATE 100 MG: 100 TABLET ORAL at 20:11

## 2022-01-18 NOTE — PROGRESS NOTES
Problem: Falls - Risk of  Goal: *Absence of Falls  Description: Document Keisha Kaplan Fall Risk and appropriate interventions in the flowsheet.   Outcome: Progressing Towards Goal  Note: Fall Risk Interventions:  Mobility Interventions: Patient to call before getting OOB         Medication Interventions: Patient to call before getting OOB    Elimination Interventions: Call light in reach

## 2022-01-18 NOTE — PROGRESS NOTES
CM met with patient at bedside to inform her of [facility] accepting her for rehab, post hospitalization pending authorization. Patient is in agreement with going to [facility]. Updated clinical's will be uploaded via [employer] once received.      Bhavana Humphrey

## 2022-01-18 NOTE — PROGRESS NOTES
Spiritual Care Assessment/Progress Note  [facility]      NAME: Fern Rendon      MRN: [id number]  AGE: 49 y.o.  SEX: female  Spiritism Affiliation: No Alevism   Language: English     1/18/2022     Total Time (in minutes): 13     Spiritual Assessment begun in [facility] through conversation with:         [x]Patient        [] Family    [] Friend(s)        Reason for Consult: Initial/Spiritual assessment, patient floor     Spiritual beliefs: (Please include comment if needed)     [x] Identifies with a vikas tradition:  Kim Hawley       [] Supported by a vikas community:            [] Claims no spiritual orientation:           [] Seeking spiritual identity:                [] Adheres to an individual form of spirituality:           [] Not able to assess:                           Identified resources for coping:      [x] Prayer                               [] Music                  [] Guided Imagery     [] Family/friends                 [] Pet visits     [] Devotional reading                         [] Unknown     [x] Other: God and self                                            Interventions offered during this visit: (See comments for more details)    Patient Interventions: Affirmation of emotions/emotional suffering,Affirmation of vikas,Catharsis/review of pertinent events in supportive environment,Coping skills reviewed/reinforced,Iconic (affirming the presence of God/Higher Power),Initial/Spiritual assessment, patient floor,Life review/legacy,Normalization of emotional/spiritual concerns,Spiritism beliefs/image of God discussed           Plan of Care:     [] Support spiritual and/or cultural needs    [] Support AMD and/or advance care planning process      [] Support grieving process   [] Coordinate Rites and/or Rituals    [] Coordination with community clergy   [] No spiritual needs identified at this time   [] Detailed Plan of Care below (See Comments)  [] Make referral to Music Therapy  [] Make referral to Pet Therapy     [] Make referral to Addiction services  [] Make referral to [facility]  [] Make referral to Spiritual Care Partner  [] No future visits requested        [x] Contact Spiritual Care for further referrals     Comments:  Visited patient in the 2 [place] for initial assessment. Patient was alone during the visit. Patient stated she is doing as best as she can on daily basis and stated she does not have many close family members. Expressed her vikas in God whom she looks to for strength. Seemed to process her emotions tearfully. Provided chaplaincy education, listened with empathy and reflection while exploring her needs and resources. Facilitated storytelling, normalized her emotions and coping skills while affirming her vikas in God. Advised of  availability. Contact chaplains for further referrals. Chaplain Ciara Alford M.Div.    can be reached by calling the  at [facility]  [phone]

## 2022-01-18 NOTE — PROGRESS NOTES
Problem: Mobility Impaired (Adult and Pediatric)  Goal: *Acute Goals and Plan of Care (Insert Text)  Description: Physical Therapy Goals  Initiated 1/12/22  1)  I with HEP in 7 days to improve overall functional mobility. 2)  Bed mobility with min A in 7 days to prevent skin breakdown. 3)  Pt to perform stand pivot transfer from bed to chair with CGA in 7 days. 3)  Pt to amb 100ft with LRAD and sup in 7 days    Pt. Goal:  Pt to be able to walk safely without falls. 1/18/2022 1613 by Neftaly Irvin  Outcome: Progressing Towards Goal  1/18/2022 1613 by Neftaly Irvin  Outcome: Progressing Towards Goal   PHYSICAL THERAPY TREATMENT  Patient: Blake Segundo (48 y.o. female)  Date: 1/18/2022  Diagnosis: Cancer of sigmoid colon ([facility] [C18.7]  Metastatic colon cancer to liver ([facility] [C18.9, C78.7] <principal problem not specified>  Procedure(s) (LRB):  EXPLORATORY LAPAROTOMY, SIGMOID COLECTOMY, LOW ANTERIOR RESECTION, APPENDECTOMY, LIVER METASTECTOMY, CYSTOURETHROSCOPY, BILATERAL URETERAL STENT PLACEMENT; (N/A)  CYSTOURETHROSCOPY, BILATERAL URETERAL STENT PLACEMENT (Bilateral) 8 Days Post-Op  Precautions:    Chart, physical therapy assessment, plan of care and goals were reviewed. ASSESSMENT  Patient continues with skilled PT services and is progressing towards goals. Pt. Supine upon arrival and agreed to work with therapy. Pt. I with bed mobility. Pt.performed Sit to stand with SBA. Pt. Ambulated 46' with CGA X 1. Pt. Did not want to use RW this session. Gt. Steady, however, pt. Had a tendency to reach for the wall or bed when walking in the room. No LOB noted. Current Level of Function Impacting Discharge (mobility/balance): decreased mobility    Other factors to consider for discharge: safety         PLAN :  Patient continues to benefit from skilled intervention to address the above impairments. Continue treatment per established plan of care. to address goals.     Recommendation for discharge: (in order for the patient to meet his/her long term goals)  SNF vs HHPT pending safe D/C placement Will defer to case management    This discharge recommendation:  Has not yet been discussed the attending provider and/or case management    IF patient discharges home will need the following DME:None       SUBJECTIVE:   Patient stated I 've been up and walking to the bathroom, I don't need a walker. \"  OT entered the room for session since pt. Did not want to do therapy twice. CO RX  partially this session. OBJECTIVE DATA SUMMARY:   Critical Behavior:  Neurologic State: Alert  Orientation Level: Oriented X4  Cognition: Follows commands,Impaired decision making     Functional Mobility Training:  Bed Mobility:  Rolling: Independent  Supine to Sit: Independent     Scooting: Independent        Transfers:  Sit to Stand: Stand-by assistance  Stand to Sit: Stand-by assistance        Bed to Chair: Contact guard assistance            Pt. Ambulated to the bathroom. In bathroom with OT for ADL s. Balance:  Sitting: Intact; Without support  Sitting - Static: Good (unsupported)  Sitting - Dynamic: Good (unsupported)  Standing: Impaired; Without support  Standing - Static: Good;Occasional  Standing - Dynamic : Fair;Occasional  Ambulation/Gait Training:  Distance (ft): 52 Feet (ft)  Assistive Device:  (declined use of RW)  Ambulation - Level of Assistance: Contact guard assistance                       Speed/Lynda: Slow                Therapeutic Exercises: AP X 12    Pain Ratin/10 abdomen      Activity Tolerance:   Fair  Please refer to the flowsheet for vital signs taken during this treatment. After treatment patient left in no apparent distress:   Sitting in chair, Call bell within reach, and Left pt. With OT. COMMUNICATION/COLLABORATION:   The patients plan of care was discussed with: Occupational therapy assistant and Registered nurse.      Jarod Denson   Time Calculation: 13 mins

## 2022-01-18 NOTE — PROGRESS NOTES
OCCUPATIONAL THERAPY TREATMENT  Patient: Veda Age (17 y.o. female)  Date: 1/18/2022  Diagnosis: Cancer of sigmoid colon ([facility] [C18.7]  Metastatic colon cancer to liver ([facility] [C18.9, C78.7] <principal problem not specified>  Procedure(s) (LRB):  EXPLORATORY LAPAROTOMY, SIGMOID COLECTOMY, LOW ANTERIOR RESECTION, APPENDECTOMY, LIVER METASTECTOMY, CYSTOURETHROSCOPY, BILATERAL URETERAL STENT PLACEMENT; (N/A)  CYSTOURETHROSCOPY, BILATERAL URETERAL STENT PLACEMENT (Bilateral) 8 Days Post-Op  Precautions:    Chart, occupational therapy assessment, plan of care, and goals were reviewed. ASSESSMENT  Patient continues with skilled OT services and is progressing towards goals. Upon [facility]/PTA arrival, pt semi supine in bed and agreeable to tx session with encouragement. Pt completed bed mobility with independence. Pt completed sit>stand with SBA and declined use of RW. Pt ambulated in room with PTA. Pt then completed standing face washing with CGA and LB bathing with CGA. Pt reporting feeling dizzy during session. Pt noted with no LOB, but reached for wall, bed, and therapist during session due to dizziness. Pt ambulated back to recliner in room with CGA and completed standing donning of clean gown with Hilda due to line management. Reviewed UE HEP and pt verbalized understanding. Pt left sitting in chair with call bell within reach. Will continue to follow pt throughout remainder of stay and progress towards OT goals. Recommending SNF vs  HHOT pending safe discharge placement, will derefer to CM for further d/c planning. Current Level of Function Impacting Discharge (ADLs): Hilda UB dressing, CGA LB bathing    Other factors to consider for discharge: home support, severity of deficits         PLAN :  Patient continues to benefit from skilled intervention to address the above impairments. Continue treatment per established plan of care. to address goals.     Recommend next OT session: standing grooming    Recommendation for discharge: (in order for the patient to meet his/her long term goals)  [place] vs HHOT pending safe discharge placement, will derefer to CM for further d/c planning    This discharge recommendation:  Has been made in collaboration with the attending provider and/or case management    IF patient discharges home will need the following DME: TBD       SUBJECTIVE:   Patient stated I 've been up and walking to the bathroom, I don't need a walker.     OBJECTIVE DATA SUMMARY:   Cognitive/Behavioral Status:  Neurologic State: Alert  Orientation Level: Oriented X4  Cognition: Follows commands; Impaired decision making    Functional Mobility and Transfers for ADLs:  Bed Mobility:  Rolling: Independent  Supine to Sit: Independent  Scooting: Independent    Transfers:  Sit to Stand: Stand-by assistance  Functional Transfers  Bathroom Mobility: Contact guard assistance  Bed to Chair: Contact guard assistance    Balance:  Sitting: Intact; Without support  Sitting - Static: Good (unsupported)  Sitting - Dynamic: Good (unsupported)  Standing: Impaired; Without support  Standing - Static: Good;Occasional  Standing - Dynamic : Fair;Occasional    ADL Intervention:  Grooming  Position Performed: Standing  Washing Face: Contact guard assistance    Lower Body Bathing  Perineal  : Contact guard assistance  Position Performed: Standing    Upper Body [address]: Minimum  assistance    Pain:  7/10 abdomen pain    Activity Tolerance:   Fair  Please refer to the flowsheet for vital signs taken during this treatment. After treatment patient left in no apparent distress:   Sitting in chair and Call bell within reach    COMMUNICATION/COLLABORATION:   The patients plan of care was discussed with: Physical therapy assistant and Registered nurse. [place] with PT due to decreased therapy tolerance.     BEATRIZ Sanders  Time Calculation: 24 mins    Problem: Self Care Deficits Care Plan (Adult)  Goal: *Acute Goals and Plan of Care (Insert Text)  Description: 1. Pt will be IND sup <> sit in prep for EOB ADLs  2. Pt will be IND grooming standing at sink  3. Pt will be IND LE dressing sitting EOB/long sit  4. Pt will be IND sit <>  prep for toileting LRAD  5. Pt will be IND toileting/toilet transfer/cloth mgmt LRAD  6.   Pt will be IND following UE HEP in prep for self care tasks      1/18/2022 1346 by BEATRIZ Silva  Outcome: Progressing Towards Goal

## 2022-01-18 NOTE — PROGRESS NOTES
Problem: Falls - Risk of  Goal: *Absence of Falls  Description: Document Iram Chowdary Fall Risk and appropriate interventions in the flowsheet. Outcome: Progressing Towards Goal  Note: Fall Risk Interventions:  Mobility Interventions: Patient to call before getting OOB         Medication Interventions: Patient to call before getting OOB    Elimination Interventions: Call light in reach              Problem: Pressure Injury - Risk of  Goal: *Prevention of pressure injury  Description: Document Miles Scale and appropriate interventions in the flowsheet.   Outcome: Progressing Towards Goal  Note: Pressure Injury Interventions:  Sensory Interventions: Assess changes in LOC    Moisture Interventions: Absorbent underpads    Activity Interventions: Increase time out of bed    Mobility Interventions: HOB 30 degrees or less    Nutrition Interventions: Document food/fluid/supplement intake    Friction and Shear Interventions: Minimize layers

## 2022-01-18 NOTE — PROGRESS NOTES
Problem: Falls - Risk of  Goal: *Absence of Falls  Description: Document [place] Fall Risk and appropriate interventions in the flowsheet. Outcome: Progressing Towards Goal  Note: Fall Risk Interventions:  Mobility Interventions: Patient to call before getting OOB         Medication Interventions: Patient to call before getting OOB    Elimination Interventions: Call light in reach              Problem: Pressure Injury - Risk of  Goal: *Prevention of pressure injury  Description: Document Miles Scale and appropriate interventions in the flowsheet.   Outcome: Progressing Towards Goal  Note: Pressure Injury Interventions:  Sensory Interventions: Assess changes in LOC    Moisture Interventions: Absorbent underpads    Activity Interventions: Increase time out of bed    Mobility Interventions: HOB 30 degrees or less    Nutrition Interventions: Document food/fluid/supplement intake    Friction and Shear Interventions: Minimize layers

## 2022-01-19 PROCEDURE — 65270000029 HC RM PRIVATE

## 2022-01-19 PROCEDURE — 74011250636 HC RX REV CODE- 250/636: Performed by: COLON & RECTAL SURGERY

## 2022-01-19 PROCEDURE — 74011250637 HC RX REV CODE- 250/637: Performed by: SURGERY

## 2022-01-19 PROCEDURE — 99024 POSTOP FOLLOW-UP VISIT: CPT | Performed by: COLON & RECTAL SURGERY

## 2022-01-19 PROCEDURE — 74011250637 HC RX REV CODE- 250/637: Performed by: COLON & RECTAL SURGERY

## 2022-01-19 PROCEDURE — 74011000250 HC RX REV CODE- 250: Performed by: COLON & RECTAL SURGERY

## 2022-01-19 RX ADMIN — OXYCODONE HYDROCHLORIDE 5 MG: 5 TABLET ORAL at 08:37

## 2022-01-19 RX ADMIN — OXYCODONE HYDROCHLORIDE 10 MG: 10 TABLET ORAL at 21:13

## 2022-01-19 RX ADMIN — TRIAMCINOLONE ACETONIDE: 1 CREAM TOPICAL at 21:22

## 2022-01-19 RX ADMIN — CYCLOBENZAPRINE 10 MG: 10 TABLET, FILM COATED ORAL at 14:11

## 2022-01-19 RX ADMIN — CYCLOBENZAPRINE 10 MG: 10 TABLET, FILM COATED ORAL at 21:13

## 2022-01-19 RX ADMIN — SODIUM CHLORIDE, PRESERVATIVE FREE 20 MG: 5 INJECTION INTRAVENOUS at 08:31

## 2022-01-19 RX ADMIN — OXCARBAZEPINE 300 MG: 150 TABLET, FILM COATED ORAL at 21:22

## 2022-01-19 RX ADMIN — GUAIFENESIN 600 MG: 600 TABLET, EXTENDED RELEASE ORAL at 08:31

## 2022-01-19 RX ADMIN — TRIAMCINOLONE ACETONIDE: 1 CREAM TOPICAL at 08:33

## 2022-01-19 RX ADMIN — BUSPIRONE HYDROCHLORIDE 7.5 MG: 5 TABLET ORAL at 21:14

## 2022-01-19 RX ADMIN — BUSPIRONE HYDROCHLORIDE 7.5 MG: 5 TABLET ORAL at 08:30

## 2022-01-19 RX ADMIN — POTASSIUM CHLORIDE, DEXTROSE MONOHYDRATE AND SODIUM CHLORIDE 125 ML/HR: 150; 5; 450 INJECTION, SOLUTION INTRAVENOUS at 21:08

## 2022-01-19 RX ADMIN — POTASSIUM CHLORIDE, DEXTROSE MONOHYDRATE AND SODIUM CHLORIDE 125 ML/HR: 150; 5; 450 INJECTION, SOLUTION INTRAVENOUS at 11:00

## 2022-01-19 RX ADMIN — ENOXAPARIN SODIUM 40 MG: 100 INJECTION SUBCUTANEOUS at 16:28

## 2022-01-19 RX ADMIN — GUAIFENESIN 600 MG: 600 TABLET, EXTENDED RELEASE ORAL at 21:13

## 2022-01-19 RX ADMIN — SODIUM CHLORIDE, PRESERVATIVE FREE 20 MG: 5 INJECTION INTRAVENOUS at 21:13

## 2022-01-19 RX ADMIN — BUPROPION HYDROCHLORIDE 150 MG: 150 TABLET, FILM COATED, EXTENDED RELEASE ORAL at 08:31

## 2022-01-19 RX ADMIN — OXYCODONE HYDROCHLORIDE 5 MG: 5 TABLET ORAL at 14:11

## 2022-01-19 NOTE — PROGRESS NOTES
Patient declined PT this morning and reported she was discharging to rehab today.  Will follow up with patient another time if pt still remains in hospital.

## 2022-01-19 NOTE — PROGRESS NOTES
Problem: Falls - Risk of  Goal: *Absence of Falls  Description: Document Cherylle Cockayne Fall Risk and appropriate interventions in the flowsheet.   Outcome: Progressing Towards Goal  Note: Fall Risk Interventions:  Mobility Interventions: Patient to call before getting OOB         Medication Interventions: Patient to call before getting OOB    Elimination Interventions: Call light in reach

## 2022-01-19 NOTE — PROGRESS NOTES
Problem: Falls - Risk of  Goal: *Absence of Falls  Description: Document Sofia Fothergill Fall Risk and appropriate interventions in the flowsheet. Outcome: Progressing Towards Goal  Note: Fall Risk Interventions:  Mobility Interventions: Patient to call before getting OOB         Medication Interventions: Patient to call before getting OOB    Elimination Interventions: Call light in reach              Problem: Pressure Injury - Risk of  Goal: *Prevention of pressure injury  Description: Document Miles Scale and appropriate interventions in the flowsheet.   Outcome: Progressing Towards Goal  Note: Pressure Injury Interventions:  Sensory Interventions: Minimize linen layers    Moisture Interventions: Absorbent underpads    Activity Interventions: Increase time out of bed    Mobility Interventions: HOB 30 degrees or less    Nutrition Interventions: Document food/fluid/supplement intake    Friction and Shear Interventions: Minimize layers

## 2022-01-19 NOTE — PROGRESS NOTES
Progress Note    Patient: Candie Colby MRN: [id number]  SSN: [id number]    YOB: 1965  Age: 49 y.o. Sex: female      Admit Date: 1/10/2022    LOS: 9 days     Subjective:   Postoperative day 9  Patient seen in bed  No complaints    Objective:     Vitals:    01/18/22 0848 01/18/22 1746 01/18/22 2001 01/19/22 0241   BP: (!) 145/87 120/78 (!) 137/92 103/66   Pulse: 73 89 79 66   Resp: 16 16 16 16   Temp: 98.1 °F (36.7 °C) 98.4 °F (36.9 °C) 98 °F (36.7 °C) 97.7 °F (36.5 °C)   SpO2: 100% 98% 99% 99%   Weight:       Height:            Intake and Output:  Current Shift: No intake/output data recorded. Last three shifts: 01/17 1901 - 01/19 0700  In: 5750 [P.O.:800; I.V.:4950]  Out: -     Review of Systems:  ROS     Physical Exam:   Abdomen is soft, appropriately tender, nondistended, incision clean and intact    Lab/Data Review:  No results found for this or any previous visit (from the past 24 hour(s)).          Assessment:     Active Problems:    Cancer of sigmoid colon ([address] (1/10/2022)      Metastatic colon cancer to liver [facility]) (1/10/2022)        Plan:   Continue regular diet  Out of bed ambulate  Awaiting authorization for SNF for discharge    Signed By: Cande Garza MD     January 19, 2022

## 2022-01-19 NOTE — PROGRESS NOTES
OT attempted to see Pt at 10:42am, however patient refused 2/2 having all her self care needs met and is leaving for [facility]. Will continue follow. Thanks!

## 2022-01-19 NOTE — PROGRESS NOTES
Progress Note    Patient: Cinthya Ceja MRN: [id number]  SSN: [id number]    YOB: 1965  Age: 49 y.o. Sex: female      Admit Date: 1/10/2022    LOS: 8 days     Subjective:   Postoperative day 8  Patient seen in bed  Continues to have bowel movements and passing flatus    Objective:     Vitals:    01/18/22 0242 01/18/22 0848 01/18/22 1746 01/18/22 2001   BP: 127/84 (!) 145/87 120/78 (!) 137/92   Pulse: 68 73 89 79   Resp: 16 16 16 16   Temp: 97.5 °F (36.4 °C) 98.1 °F (36.7 °C) 98.4 °F (36.9 °C) 98 °F (36.7 °C)   SpO2: 99% 100% 98% 99%   Weight:       Height:            Intake and Output:  Current Shift: No intake/output data recorded. Last three shifts: 01/17 0701 - 01/18 1900  In: 3677 [P.O.:100; I.V.:3450]  Out: -     Review of Systems:  ROS     Physical Exam:   Abdomen is soft, appropriately tender, nondistended, incision clean    Lab/Data Review:  No results found for this or any previous visit (from the past 24 hour(s)).          Assessment:     Active Problems:    Cancer of sigmoid colon ([facility] (1/10/2022)      Metastatic colon cancer to [facility]) (1/10/2022)        Plan:   Doing well  Advance diet  Plan discharge in 24 to 48 hours to skilled nursing facility/[facility]  Signed By: Ashley Benton MD     January 18, 2022

## 2022-01-20 LAB
COVID-19 RAPID TEST, COVR: NOT DETECTED
SPECIMEN SOURCE: NORMAL

## 2022-01-20 PROCEDURE — 74011250637 HC RX REV CODE- 250/637: Performed by: SURGERY

## 2022-01-20 PROCEDURE — 74011250636 HC RX REV CODE- 250/636: Performed by: COLON & RECTAL SURGERY

## 2022-01-20 PROCEDURE — 65270000029 HC RM PRIVATE

## 2022-01-20 PROCEDURE — 87635 SARS-COV-2 COVID-19 AMP PRB: CPT

## 2022-01-20 PROCEDURE — 99024 POSTOP FOLLOW-UP VISIT: CPT | Performed by: COLON & RECTAL SURGERY

## 2022-01-20 PROCEDURE — 74011250637 HC RX REV CODE- 250/637: Performed by: COLON & RECTAL SURGERY

## 2022-01-20 PROCEDURE — 74011000250 HC RX REV CODE- 250: Performed by: COLON & RECTAL SURGERY

## 2022-01-20 PROCEDURE — 97535 SELF CARE MNGMENT TRAINING: CPT

## 2022-01-20 RX ORDER — CYCLOBENZAPRINE HCL 10 MG
10 TABLET ORAL
Qty: 60 TABLET | Refills: 0 | Status: SHIPPED | OUTPATIENT
Start: 2022-01-20 | End: 2022-02-19

## 2022-01-20 RX ADMIN — GUAIFENESIN 600 MG: 600 TABLET, EXTENDED RELEASE ORAL at 22:00

## 2022-01-20 RX ADMIN — SODIUM CHLORIDE, PRESERVATIVE FREE 20 MG: 5 INJECTION INTRAVENOUS at 08:41

## 2022-01-20 RX ADMIN — TRIAMCINOLONE ACETONIDE: 1 CREAM TOPICAL at 09:00

## 2022-01-20 RX ADMIN — TRIAMCINOLONE ACETONIDE: 1 CREAM TOPICAL at 21:00

## 2022-01-20 RX ADMIN — CYCLOBENZAPRINE 10 MG: 10 TABLET, FILM COATED ORAL at 15:26

## 2022-01-20 RX ADMIN — OXYCODONE HYDROCHLORIDE 5 MG: 5 TABLET ORAL at 15:26

## 2022-01-20 RX ADMIN — OXYCODONE HYDROCHLORIDE 10 MG: 10 TABLET ORAL at 22:02

## 2022-01-20 RX ADMIN — BUSPIRONE HYDROCHLORIDE 7.5 MG: 5 TABLET ORAL at 08:41

## 2022-01-20 RX ADMIN — CYCLOBENZAPRINE 10 MG: 10 TABLET, FILM COATED ORAL at 21:59

## 2022-01-20 RX ADMIN — GUAIFENESIN 600 MG: 600 TABLET, EXTENDED RELEASE ORAL at 08:41

## 2022-01-20 RX ADMIN — OXYCODONE HYDROCHLORIDE 10 MG: 10 TABLET ORAL at 08:40

## 2022-01-20 RX ADMIN — OXCARBAZEPINE 300 MG: 150 TABLET, FILM COATED ORAL at 22:00

## 2022-01-20 RX ADMIN — SODIUM CHLORIDE, PRESERVATIVE FREE 20 MG: 5 INJECTION INTRAVENOUS at 21:59

## 2022-01-20 RX ADMIN — BUPROPION HYDROCHLORIDE 150 MG: 150 TABLET, FILM COATED, EXTENDED RELEASE ORAL at 08:40

## 2022-01-20 RX ADMIN — BUSPIRONE HYDROCHLORIDE 7.5 MG: 5 TABLET ORAL at 22:01

## 2022-01-20 NOTE — PROGRESS NOTES
Attempted to call pt's insurance to figure out if they would cover her transport to [facility]. Could not get past the automated system to get an answer. Had another nurse attempt to contact insurance Ángel Warner RN) who finally got the okay that insurance would pay for the [facility] transport. At this point, Penny Schroeder had come and gone. Andie Mack was called back and dispatch was told that if the pt didn't get to her destination by 20:00 they would not accept her. Dispatch stated they would not make it back in time. Pt was informed, and had a concern that if they didn't get her tonight, they might not accept her tomorrow morning. [facility] [place] [facility] was called and this nurse had it verified that they would accept the pt in the morning.

## 2022-01-20 NOTE — PROGRESS NOTES
Comprehensive Nutrition Assessment    Type and Reason for Visit: Reassess (goals)    Nutrition Recommendations/Plan:   Continue Regular diet as tolerated. Monitor intake, labs, weights, bowel fx and skin changes. Nutrition Assessment:  Admitted for elective ex-lap for resection of colon and liver cancer (1/10), unknown amount of colon removed. Pt now NPO x4 days awaiting return of bowel function. Pt endorses intermittent PO pta, wt fluctuates with fluid. Stated she is starting to feel hungry and has sharp abd pain that may be gas-related. Post op day 10 with Diet of Full liquid started 1/17. Pt now on Regular diet with good po intake % per EMR. Labs: H/H 8.2/27.5, K+ 3.7, BUN 2, Cr 0.63, BG 95, Alb 2.0, Mg 2.0. Meds: Buproprion, D5, Pepcid, oxycodone,seroquel. Malnutrition Assessment:  Malnutrition Status:  Mild malnutrition    Context:  Chronic illness     Findings of the 6 clinical characteristics of malnutrition:   Energy Intake:  7 - 75% or less est energy requirements for 1 month or longer (per diet recall)  Weight Loss:  No significant weight loss     Body Fat Loss:  No significant body fat loss,     Muscle Mass Loss:  No significant muscle mass loss,    Fluid Accumulation:  No significant fluid accumulation,            Estimated Daily Nutrient Needs:  Energy (kcal): 1507kcals (30kcals/kg, cancer); Weight Used for Energy Requirements: Adjusted (50.25kg)  Protein (g): 94g (1.3g/kg ABW); Weight Used for Protein Requirements: Current  Fluid (ml/day): 1500ml; Method Used for Fluid Requirements: 1 ml/kcal      Nutrition Related Findings:  Pt appears well nourished, obese. Denies issues with c/s. No N/V/D/C, ABD soft, non distended. No edema. Pt appears well nourished, obese. Denies issues with c/s. No N/V/D/C, ABD soft, non distended. No edema.     Wounds:    Surgical incision (vag,abd Clean and intact)       Current Nutrition Therapies:  ADULT DIET Regular    Anthropometric Measures:  · Height:  4' 11.02\" (149.9 cm)  · Current Body Wt:  72 kg (158 lb 11.7 oz) (1/11)   · Admission Body Wt:  158 lb 11.7 oz (1/11)    · Usual Body Wt:  63.5 kg (140 lb) (per pt, wt fluctuates with fluid)     · Ideal Body Wt:  95 lbs:  167.1 %   · Adjusted Body Weight:   ; Weight Adjustment for:  (50.25kg)   · BMI Category:  Obese class 1 (BMI 30.0-34. 9)       Nutrition Diagnosis:   · In context of chronic illness related to altered GI structure,altered GI function as evidenced by GI abnormality      Nutrition Interventions:   Food and/or Nutrient Delivery: Continue current diet  Nutrition Education and Counseling: No recommendations at this time  Coordination of Nutrition Care: Continue to monitor while inpatient    Goals:  Pt will continue to meet >/=75% of EENs through next review. weight maintenance +/-0.5kg.  return to normal Bowel fx post op       Nutrition Monitoring and Evaluation:   Behavioral-Environmental Outcomes: None identified  Food/Nutrient Intake Outcomes: Food and nutrient intake,Diet advancement/tolerance  Physical Signs/Symptoms Outcomes: Biochemical data,GI status,Weight    Discharge Planning:    No discharge needs at this time     Electronically signed by Mckenzie Rascon RD on 1/20/2022 at 11:22 AM    Contact: [phone]

## 2022-01-20 NOTE — PROGRESS NOTES
Problem: Falls - Risk of  Goal: *Absence of Falls  Description: Document Sofia Fothergill Fall Risk and appropriate interventions in the flowsheet. Outcome: Progressing Towards Goal  Note: Fall Risk Interventions:  Mobility Interventions: Communicate number of staff needed for ambulation/transfer,Patient to call before getting OOB,Utilize walker, cane, or other assistive device         Medication Interventions: Patient to call before getting OOB,Teach patient to arise slowly    Elimination Interventions: Call light in reach              Problem: Pressure Injury - Risk of  Goal: *Prevention of pressure injury  Description: Document Miles Scale and appropriate interventions in the flowsheet.   Outcome: Progressing Towards Goal  Note: Pressure Injury Interventions:  Sensory Interventions: Minimize linen layers    Moisture Interventions: Absorbent underpads    Activity Interventions: Increase time out of bed    Mobility Interventions: HOB 30 degrees or less    Nutrition Interventions: Document food/fluid/supplement intake,Offer support with meals,snacks and hydration    Friction and Shear Interventions: Minimize layers

## 2022-01-20 NOTE — PROGRESS NOTES
Problem: Self Care Deficits Care Plan (Adult)  Goal: *Acute Goals and Plan of Care (Insert Text)  Description: 1. Pt will be IND sup <> sit in prep for EOB ADLs  2. Pt will be IND grooming standing at sink  3. Pt will be IND LE dressing sitting EOB/long sit  4. Pt will be IND sit <>  prep for toileting LRAD  5. Pt will be IND toileting/toilet transfer/cloth mgmt LRAD  6. Pt will be IND following UE HEP in prep for self care tasks      Outcome: Progressing Towards Goal   OCCUPATIONAL THERAPY TREATMENT  Patient: Onelia Padilla (37 y.o. female)  Date: 1/20/2022  Diagnosis: Cancer of sigmoid colon ([facility] [C18.7]  Metast[facility] ([facility] [C18.9, C78.7] <principal problem not specified>  Procedure(s) (LRB):  EXPLORATORY LAPAROTOMY, SIGMOID COLECTOMY, LOW ANTERIOR RESECTION, APPENDECTOMY, LIVER METASTECTOMY, CYSTOURETHROSCOPY, BILATERAL URETERAL STENT PLACEMENT; (N/A)  CYSTOURETHROSCOPY, BILATERAL URETERAL STENT PLACEMENT (Bilateral) 10 Days Post-Op  Precautions: FALL  Chart, occupational therapy assessment, plan of care, and goals were reviewed. ASSESSMENT  Patient continues with skilled OT services and is progressing towards goals. Patient received moving about room upon [facility] arrival and agreeable to work with therapist. Pt observed making bed and straightening up her room for ~7 minutes with no LOB. Pt ambulated to bathroom to perform grooming, however pt reported feeling dizzy holding on to towel rack but recovering with MOD I and without AD. Therapist cued pt to sit and take rest break EOB. STS without AD, pt ambulated bed to chair with MOD I,  pt performed UB/LB  bathing, LB dressing (socks, underwear, slippers) and UB dressing (new gown) with MOD I and additional time 2/2 pain and activity tolerance. Training provided for [employer] techniques to sit much as possible while dressing/grooming for increased ADL independence and decreased fall risk.   Pt left resting in chair with call bell. Patient would benefit from continued skilled Occupational services while at Psychiatric in order to increase safety and independence with self care and functional tranfers/mobility. Recommend at discharge to SNF when medically appropriate. Current Level of Function Impacting Discharge (ADLs): MOD I UB/LB dressing, grooming and bathing    Other factors to consider for discharge: Time of onset, medical prognosis/diagnosis, severity of deficits, PLOF, home environment, and family support         PLAN :  Patient continues to benefit from skilled intervention to address the above impairments. Continue treatment per established plan of care. to address goals    Recommend next OT session: HEP, EC and fall safety    Recommendation for discharge: (in order for the patient to meet his/her long term goals)  López Nieto    This discharge recommendation:  Has been made in collaboration with the attending provider and/or case management    IF patient discharges home will need the following DME: TBD       SUBJECTIVE:   Patient stated \" I'm use to doing for my self.     OBJECTIVE DATA SUMMARY:   Cognitive/Behavioral Status:  Neurologic State: Alert  Orientation Level: Oriented X4  Cognition: Follows commands             Functional Mobility and Transfers for ADLs:  Bed Mobility:       Transfers:  Sit to Stand: Modified independent  Functional Transfers  Bathroom Mobility: Modified independent       Balance:  Standing: Intact; Without support  Standing - Static: Good  Standing - Dynamic : Fair    ADL Intervention:       Grooming  Grooming Assistance: Modified independent  Position Performed: Seated in chair  Washing Hands: Modified independent    Upper Body Bathing  Bathing Assistance: Modified independent; Set-up  Position Performed: Seated in chair    Lower Body Bathing  Bathing Assistance: Modified independent; Set-up  Perineal  : Modified independent  Position Performed: Seated in chair  Lower Body : Modified independent; Set-up  Position Performed: Seated in chair    Upper Body Dressing Assistance  Dressing Assistance: Modified independent  Hospital Gown: Modified independent    Lower Body Dressing Assistance  Dressing Assistance: Modified independent  Socks: Modified independent  Slip on Shoes Without Back: Modified independent  Leg Crossed Method Used: Yes  Position Performed: Seated in chair           Pain:  6/10 stomach area and 7-8/10 in back area    Activity Tolerance:   Good  Please refer to the flowsheet for vital signs taken during this treatment. After treatment patient left in no apparent distress:   Sitting in chair and Call bell within reach    COMMUNICATION/COLLABORATION:   The patients plan of care was discussed with: Registered nurse.      BEATRIZ Kerr  Time Calculation: 35 mins

## 2022-01-20 NOTE — DISCHARGE SUMMARY
Admit date: 1/10/2022   Admitting Provider: Mónica Singer MD    Discharge date: 1/20/2022  Discharging Provider: Mónica Singer MD      * Admission Diagnoses: Cancer of sigmoid colon ([facility] [C18.7]; [facility] [C18.9, C78.7]    * Discharge Diagnoses:  Same  Hospital Problems as of 1/20/2022 Never Reviewed          Codes Class Noted - Resolved POA    Cancer of sigmoid colon ([facility] ICD-10-CM: C18.7  ICD-9-CM: 153.3  1/10/2022 - Present Unknown        Metastatic colon cancer to liver [facility]) ICD-10-CM: C18.9, C78.7  ICD-9-CM: 153.9, 197.7  1/10/2022 - Present Unknown              * Hospital Course: Patient is a 22-year-old female who was electively admitted on January 10, 2022 with a sigmoid colon cancer and liver metastasis who underwent an exploratory laparotomy with sigmoid colectomy and liver metastectomy. For full operative details refer to operative note. Postoperatively she did well. With return of bowel function his diet was slowly advanced. She was seen by physical therapy and Occupational Therapy and recommendation was made for discharge to's skilled nursing facility. On the day of discharge she was tolerating a diet and having for bowel function with bowel movement and passing flatus. * Procedures:   Procedure(s):  EXPLORATORY LAPAROTOMY, SIGMOID COLECTOMY, LOW ANTERIOR RESECTION, APPENDECTOMY, LIVER METASTECTOMY, CYSTOURETHROSCOPY, BILATERAL URETERAL STENT PLACEMENT;  CYSTOURETHROSCOPY, BILATERAL URETERAL STENT PLACEMENT      Consults: None    Discharge Exam:  Physical Exam  Constitutional:       General: She is not in acute distress. Appearance: Normal appearance. She is not ill-appearing. HENT:      Head: Normocephalic and atraumatic. Cardiovascular:      Rate and Rhythm: Normal rate and regular rhythm. Heart sounds: Normal heart sounds. Pulmonary:      Effort: Pulmonary effort is normal.      Breath sounds: Normal breath sounds.    Abdominal: General: There is no distension. Palpations: Abdomen is soft. Tenderness: There is abdominal tenderness (Appropriately tender). Comments: Incision clean and intact   Musculoskeletal:         General: Normal range of motion. Cervical back: Normal range of motion and neck supple. Skin:     General: Skin is warm and dry. Neurological:      General: No focal deficit present. Mental Status: She is alert and oriented to person, place, and time. Psychiatric:         Mood and Affect: Mood normal.         Thought Content: Thought content normal.         Judgment: Judgment normal.         * Discharge Condition: good  * Disposition: Home    Discharge Medications:  Current Discharge Medication List      START taking these medications    Details   cyclobenzaprine (FLEXERIL) 10 mg tablet Take 1 Tablet by mouth three (3) times daily as needed for Muscle Spasm(s) for up to 30 days. Qty: 60 Tablet, Refills: 0  Start date: 1/20/2022, End date: 2/19/2022         CONTINUE these medications which have NOT CHANGED    Details   oxyCODONE IR (ROXICODONE) 5 mg immediate release tablet Take 5 mg by mouth every four (4) hours as needed for Pain. buPROPion XL (WELLBUTRIN XL) 150 mg tablet Take 1 Tablet by mouth daily. OXcarbazepine (TRILEPTAL) 300 mg tablet Take 1 Tablet by mouth nightly. busPIRone (BUSPAR) 7.5 mg tablet Take 1 Tablet by mouth two (2) times a day. omeprazole (PRILOSEC) 20 mg capsule Take 1 Capsule by mouth daily. QUEtiapine (SEROquel) 100 mg tablet Take 100 mg by mouth daily. triamcinolone acetonide (KENALOG) 0.1 % ointment Apply  to affected area daily.  use thin layer         STOP taking these medications       dronabinoL (MARINOL) 2.5 mg capsule Comments:   Reason for Stopping:         traMADoL (ULTRAM) 50 mg tablet Comments:   Reason for Stopping:         PEG 3350-Electrolytes (GO-LYTELY) 236-22.74-6.74 -5.86 gram suspension Comments:   Reason for Stopping: ibuprofen (MOTRIN) 800 mg tablet Comments:   Reason for Stopping:               * Follow-up Care/Patient Instructions:   Activity: No lifting greater than 10 pounds, no strenuous activity    Diet: Regular Diet  Wound Care: Keep wound clean and dry    Follow-up Information     Follow up With Specialties Details Why Contact Info    Dr. Bernadine Bravo Oncology  Go on 1/26/2022 Oncology appointment 1/26/2022 @ 10:45AM.   [facility] [address]  [phone]      Abdi Mckinley MD Colon and Rectal Surgery, General Surgery On 1/26/2022  [address] [place] [address] Drive  [phone]      Alyson Garrison NP Nurse Practitioner   [place] [id number] [phone]            Signed:  Ashley Benton MD  1/20/2022  3:10 PM

## 2022-01-20 NOTE — PROGRESS NOTES
Progress Note    Patient: Nivia Castillo MRN: [id number]  SSN: [id number]    YOB: 1965  Age: 49 y.o. Sex: female      Admit Date: 1/10/2022    LOS: 10 days     Subjective:   Postoperative day 10  Patient seen in bed  No complaints  Tolerating diet  For return of bowel function    Objective:     Vitals:    01/19/22 0241 01/19/22 0805 01/19/22 1910 01/20/22 0203   BP: 103/66 115/74 120/80 116/74   Pulse: 66 74 76 73   Resp: 16 16 16 16   Temp: 97.7 °F (36.5 °C) 97.8 °F (36.6 °C) 97.9 °F (36.6 °C) 98.1 °F (36.7 °C)   SpO2: 99% 98% 98% 98%   Weight:       Height:            Intake and Output:  Current Shift: No intake/output data recorded. Last three shifts: 01/18 1901 - 01/20 0700  In: 5412.5 [P.O.:800; I.V.:4612.5]  Out: -     Review of Systems:  ROS     Physical Exam:   Abdomen is soft, nondistended, appropriately tender, incision clean and intact    Lab/Data Review:  No results found for this or any previous visit (from the past 24 hour(s)).          Assessment:     Active Problems:    Cancer of sigmoid colon ([facility] (1/10/2022)      Metastatic colon cancer to liver [facility]) (1/10/2022)        Plan:   Doing well  Awaiting authorization for [facility], patient ready to discharge    Signed By: Marleni Euceda MD     January 20, 2022

## 2022-01-20 NOTE — NURSE NAVIGATOR
Visited patient. Provided copy of patient's appointment (1/26/22) with medical oncology. Patient in better spirits. Provided my contact info again and suggested she reach out if any needs arise. Patient agreed. Will follow as outpatient.

## 2022-01-20 NOTE — DISCHARGE INSTRUCTIONS
No lifting greater than 10 pounds, no strenuous activity  Patient may shower, no tub baths  Follow-up my office 1 week

## 2022-01-20 NOTE — PROGRESS NOTES
14: 26PM SW spoke to admissions coordinator [facility]). Informed patient's PCR COVID test is out of date range. Requiring a Rapid COVID test.  Informed assigned RN Rebecca Doing) of rapid COVID test needed. Admissions further informed writer patient must be at the facility by 20:00PM.          BRIAN Hong            13:58PM [facility] recv'd auth from her insurance company today. Patient has a bed available today. Informed assigned RN Rebecca Doing) of auth/bed availability. Assigned RN Erbecca Doing) to page attending to inform. Room 307. RN to call report to [phone]. Fax: [phone] rapid COVID results. BRIAN Hong              11:37AM SW informed by ONC NN Trey Nash RN of oncology appointment on Jan 26th, 2022. Adrian Hutson appointment w/Dr. Keturah Peres. Patient scheduled to discharge to SNF ([facility]) pending auth from insurance. Writer informed admissions coordinator [facility]/Nelson Ayalaeat) of oncology appointment. Per admissions coordinator \"we can set up the transportation; but we don't pay for it. \"  Acknowledged understanding and voiced will inform patient of this. Pending insurance auth.          BRIAN Hong

## 2022-01-21 VITALS
DIASTOLIC BLOOD PRESSURE: 71 MMHG | TEMPERATURE: 98.7 F | RESPIRATION RATE: 20 BRPM | OXYGEN SATURATION: 99 % | HEIGHT: 59 IN | BODY MASS INDEX: 32 KG/M2 | WEIGHT: 158.73 LBS | HEART RATE: 72 BPM | SYSTOLIC BLOOD PRESSURE: 111 MMHG

## 2022-01-21 PROCEDURE — 74011250637 HC RX REV CODE- 250/637: Performed by: SURGERY

## 2022-01-21 PROCEDURE — 74011250637 HC RX REV CODE- 250/637: Performed by: COLON & RECTAL SURGERY

## 2022-01-21 RX ADMIN — QUETIAPINE FUMARATE 100 MG: 100 TABLET ORAL at 08:30

## 2022-01-21 RX ADMIN — GUAIFENESIN 600 MG: 600 TABLET, EXTENDED RELEASE ORAL at 08:30

## 2022-01-21 RX ADMIN — OXYCODONE HYDROCHLORIDE 10 MG: 10 TABLET ORAL at 08:30

## 2022-01-21 RX ADMIN — BUPROPION HYDROCHLORIDE 150 MG: 150 TABLET, FILM COATED, EXTENDED RELEASE ORAL at 08:30

## 2022-01-21 RX ADMIN — BUSPIRONE HYDROCHLORIDE 7.5 MG: 5 TABLET ORAL at 08:30

## 2022-01-21 NOTE — PROGRESS NOTES
Problem: Falls - Risk of  Goal: *Absence of Falls  Description: Document Cherylle Cockayne Fall Risk and appropriate interventions in the flowsheet. Outcome: Progressing Towards Goal  Note: Fall Risk Interventions:  Mobility Interventions: Bed/chair exit alarm,OT consult for ADLs,PT Consult for mobility concerns,PT Consult for assist device competence,Strengthening exercises (ROM-active/passive),Utilize walker, cane, or other assistive device         Medication Interventions: Bed/chair exit alarm,Patient to call before getting OOB    Elimination Interventions: Bed/chair exit alarm,Call light in reach              Problem: Pressure Injury - Risk of  Goal: *Prevention of pressure injury  Description: Document Miles Scale and appropriate interventions in the flowsheet. Outcome: Progressing Towards Goal  Note: Pressure Injury Interventions:  Sensory Interventions: Turn and reposition approx. every two hours (pillows and wedges if needed),Sit a 90-degree angle/use footstool if needed,Pad between skin to skin,Minimize linen layers,Discuss PT/OT consult with provider    Moisture Interventions: Moisture barrier,Minimize layers,Maintain skin hydration (lotion/cream),Internal/External urinary devices,Apply protective barrier, creams and emollients,Check for incontinence Q2 hours and as needed,Absorbent underpads    Activity Interventions: PT/OT evaluation,Increase time out of bed    Mobility Interventions: Turn and reposition approx.  every two hours(pillow and wedges),PT/OT evaluation,HOB 30 degrees or less    Nutrition Interventions: Offer support with meals,snacks and hydration,Document food/fluid/supplement intake,Discuss nutritional consult with provider    Friction and Shear Interventions: HOB 30 degrees or less,Minimize layers

## 2022-01-26 ENCOUNTER — OFFICE VISIT (OUTPATIENT)
Dept: SURGERY | Age: 57
End: 2022-01-26
Payer: COMMERCIAL

## 2022-01-26 VITALS
HEIGHT: 59 IN | SYSTOLIC BLOOD PRESSURE: 130 MMHG | OXYGEN SATURATION: 97 % | TEMPERATURE: 97.8 F | WEIGHT: 144 LBS | DIASTOLIC BLOOD PRESSURE: 89 MMHG | HEART RATE: 98 BPM | RESPIRATION RATE: 16 BRPM | BODY MASS INDEX: 29.03 KG/M2

## 2022-01-26 DIAGNOSIS — C18.7 CANCER OF SIGMOID COLON (HCC): Primary | ICD-10-CM

## 2022-01-26 DIAGNOSIS — C78.7 METASTATIC COLON CANCER TO LIVER (HCC): ICD-10-CM

## 2022-01-26 DIAGNOSIS — C18.9 METASTATIC COLON CANCER TO LIVER (HCC): ICD-10-CM

## 2022-01-26 PROCEDURE — 99024 POSTOP FOLLOW-UP VISIT: CPT | Performed by: COLON & RECTAL SURGERY

## 2022-01-26 NOTE — PROGRESS NOTES
Chief Complaint   Patient presents with    Follow-up     Sigmoid Colectomy      Visit Vitals  /89 (BP 1 Location: Left arm, BP Patient Position: Sitting)   Pulse 98   Temp 97.8 °F (36.6 °C) (Temporal)   Resp 16   Ht 4' 11\" (1.499 m)   Wt 144 lb (65.3 kg)   SpO2 97%   BMI 29.08 kg/m²     1. Have you been to the ER, urgent care clinic since your last visit? Hospitalized since your last visit? Yes   2. Have you seen or consulted any other health care providers outside of the [address] [place] [address] since your last visit? Include any pap smears or colon screening.  No

## 2022-01-31 NOTE — PROGRESS NOTES
OFFICE VISIT NOTE    Blake Segundo is a 49 y.o. female who presents to the office today for:    Chief Complaint   Patient presents with    Follow-up     Sigmoid Colectomy        Ms. Jyoti Denise comes in today for follow-up status post exploratory laparotomy with low anterior resection and liver metastectomy and appendectomy for sigmoid cancer with liver metastasis. Her surgery was on January 10, 2022. Her postoperative course in the hospital was uneventful. Today since discharge she has no complaints. She has been tolerating a diet. She is doing physical therapy at skilled nursing facility where she is. She is scheduled to see medical oncology today for initiation of chemotherapy. 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       Past Medical History:   Diagnosis Date    Anemia     Cancer [facility])     sigmoid colon - per Dr. Owen Ambrocio order    Depression     GERD (gastroesophageal reflux disease)     Liver spots        Past Surgical History:   Procedure Laterality Date    HX BILATERAL SALPINGECTOMY      HX  SECTION      2    HX COLONOSCOPY  2021        HX UROLOGICAL  01/10/2022    cystoscopy bilateral ureteral catheter placements       Family History   Adopted: Yes   Problem Relation Age of Onset    Colon Cancer Mother     No Known Problems Father        Social History     Socioeconomic History    Marital status:      Spouse name: Not on file    Number of children: Not on file    Years of education: Not on file    Highest education level: Not on file   Occupational History    Not on file   Tobacco Use    Smoking status: Current Some Day Smoker     Packs/day: 0.25    Smokeless tobacco: Never Used    Tobacco comment: occasionally    Vaping Use    Vaping Use: Never used   Substance and Sexual Activity    Alcohol use: Yes     Comment: occasionally    Drug use: Yes     Types: Marijuana     Comment: pt states uses vape pen daily     Sexual activity: Yes     Partners: Male     Birth control/protection: Condom   Other Topics Concern    Not on file   Social History Narrative    Not on file     Social Determinants of Health     Financial Resource Strain:     Difficulty of Paying Living Expenses: Not on file   Food Insecurity:     Worried About Running Out of Food in the Last Year: Not on file    Kalie of Food in the Last Year: Not on file   Transportation Needs:     Lack of Transportation (Medical): Not on file    Lack of Transportation (Non-Medical): Not on file   Physical Activity:     Days of Exercise per Week: Not on file    Minutes of Exercise per Session: Not on file   Stress:     Feeling of Stress : Not on file   Social Connections:     Frequency of Communication with Friends and Family: Not on file    Frequency of Social Gatherings with Friends and Family: Not on file    Attends Scientology Services: Not on file    Active Member of [address] [place] [address] or Organizations: Not on file    Attends Club or Organization Meetings: Not on file    Marital Status: Not on file   Intimate Partner Violence:     Fear of Current or Ex-Partner: Not on file    Emotionally Abused: Not on file    Physically Abused: Not on file    Sexually Abused: Not on file   Housing Stability:     Unable to Pay for Housing in the Last Year: Not on file    Number of [place] in the Last Year: Not on file    Unstable Housing in the Last Year: Not on file       No Known Allergies    Current Outpatient Medications   Medication Sig    cyclobenzaprine (FLEXERIL) 10 mg tablet Take 1 Tablet by mouth three (3) times daily as needed for Muscle Spasm(s) for up to 30 days.  oxyCODONE IR (ROXICODONE) 5 mg immediate release tablet Take 5 mg by mouth every four (4) hours as needed for Pain.     buPROPion XL (WELLBUTRIN XL) 150 mg tablet Take 1 Tablet by mouth daily.  OXcarbazepine (TRILEPTAL) 300 mg tablet Take 1 Tablet by mouth nightly.  busPIRone (BUSPAR) 7.5 mg tablet Take 15 mg by mouth two (2) times a day.  omeprazole (PRILOSEC) 20 mg capsule Take 1 Capsule by mouth daily.  QUEtiapine (SEROquel) 100 mg tablet Take 100 mg by mouth daily.  triamcinolone acetonide (KENALOG) 0.1 % ointment Apply  to affected area daily. use thin layer (Patient not taking: Reported on 1/10/2022)     No current facility-administered medications for this visit. Review of Systems   All other systems reviewed and are negative. /89 (BP 1 Location: Left arm, BP Patient Position: Sitting)   Pulse 98   Temp 97.8 °F (36.6 °C) (Temporal)   Resp 16   Ht 4' 11\" (1.499 m)   Wt 144 lb (65.3 kg)   SpO2 97%   BMI 29.08 kg/m²     Physical Exam  Abdominal:      Comments: On examination her abdomen is soft, nondistended, appropriately tender incision. Her incision is clean and intact. Her staples removed today.          Problem List Items Addressed This Visit        Digestive    Cancer of sigmoid colon [facility]) - Primary    Metastatic colon cancer to liver [facility])          Assessment and Plan:  Doing well from a surgical perspective  Continue to avoid heavy lifting or strenuous activity  Follow-up with medical oncology  Follow-up my office 1 month            Jeff Kerns MD

## 2022-02-09 ENCOUNTER — APPOINTMENT (RX ONLY)
Dept: URBAN - METROPOLITAN AREA CLINIC 1 | Facility: CLINIC | Age: 57
Setting detail: DERMATOLOGY
End: 2022-02-09

## 2022-02-09 DIAGNOSIS — L21.8 OTHER SEBORRHEIC DERMATITIS: ICD-10-CM

## 2022-02-09 DIAGNOSIS — L65.9 NONSCARRING HAIR LOSS, UNSPECIFIED: ICD-10-CM

## 2022-02-09 PROCEDURE — ? ORDER TESTS

## 2022-02-09 PROCEDURE — ? ADDITIONAL NOTES

## 2022-02-09 PROCEDURE — ? COUNSELING

## 2022-02-09 PROCEDURE — ? PRESCRIPTION

## 2022-02-09 PROCEDURE — 99204 OFFICE O/P NEW MOD 45 MIN: CPT

## 2022-02-09 RX ORDER — LIDOCAINE AND PRILOCAINE 25; 25 MG/G; MG/G
CREAM TOPICAL
Qty: 30 | Refills: 2 | Status: ERX | COMMUNITY
Start: 2022-02-09

## 2022-02-09 RX ORDER — KETOCONAZOLE 20 MG/ML
SHAMPOO, SUSPENSION TOPICAL
Qty: 120 | Refills: 4 | Status: ERX | COMMUNITY
Start: 2022-02-09

## 2022-02-09 RX ORDER — BETAMETHASONE DIPROPIONATE 0.5 MG/G
OINTMENT TOPICAL
Qty: 45 | Refills: 2 | Status: ERX | COMMUNITY
Start: 2022-02-09

## 2022-02-09 RX ADMIN — KETOCONAZOLE: 20 SHAMPOO, SUSPENSION TOPICAL at 00:00

## 2022-02-09 RX ADMIN — LIDOCAINE AND PRILOCAINE: 25; 25 CREAM TOPICAL at 00:00

## 2022-02-09 RX ADMIN — BETAMETHASONE DIPROPIONATE: 0.5 OINTMENT TOPICAL at 00:00

## 2022-02-09 ASSESSMENT — LOCATION SIMPLE DESCRIPTION DERM: LOCATION SIMPLE: LEFT SCALP

## 2022-02-09 ASSESSMENT — LOCATION ZONE DERM: LOCATION ZONE: SCALP

## 2022-02-09 ASSESSMENT — LOCATION DETAILED DESCRIPTION DERM: LOCATION DETAILED: LEFT MEDIAL FRONTAL SCALP

## 2022-02-09 NOTE — PROCEDURE: ADDITIONAL NOTES
Detail Level: Generalized
Render Risk Assessment In Note?: no
Additional Notes: Discussed ILK if there’s no improvement with Betamethasone and Ketoconozole shampoo alone

## 2022-02-10 ENCOUNTER — HOSPITAL ENCOUNTER (OUTPATIENT)
Dept: INTERVENTIONAL RADIOLOGY/VASCULAR | Age: 57
Discharge: HOME | End: 2022-02-10
Attending: INTERNAL MEDICINE | Admitting: INTERNAL MEDICINE
Payer: MEDICAID

## 2022-02-10 VITALS
OXYGEN SATURATION: 100 % | TEMPERATURE: 97.9 F | SYSTOLIC BLOOD PRESSURE: 116 MMHG | HEART RATE: 70 BPM | WEIGHT: 148 LBS | RESPIRATION RATE: 16 BRPM | DIASTOLIC BLOOD PRESSURE: 73 MMHG | HEIGHT: 59 IN | BODY MASS INDEX: 29.84 KG/M2

## 2022-02-10 DIAGNOSIS — C18.9 COLON CANCER (HCC): ICD-10-CM

## 2022-02-10 PROBLEM — D50.9 ANEMIA, IRON DEFICIENCY: Status: ACTIVE | Noted: 2022-02-10

## 2022-02-10 LAB
ANION GAP SERPL CALC-SCNC: 6 MMOL/L (ref 5–15)
BASOPHILS # BLD: 0 K/UL (ref 0–0.1)
BASOPHILS NFR BLD: 1 % (ref 0–1)
BUN SERPL-MCNC: 8 MG/DL (ref 6–20)
BUN/CREAT SERPL: 12 (ref 12–20)
CA-I BLD-MCNC: 9.1 MG/DL (ref 8.5–10.1)
CHLORIDE SERPL-SCNC: 103 MMOL/L (ref 97–108)
CO2 SERPL-SCNC: 26 MMOL/L (ref 21–32)
CREAT SERPL-MCNC: 0.68 MG/DL (ref 0.55–1.02)
DIFFERENTIAL METHOD BLD: ABNORMAL
EOSINOPHIL # BLD: 0.3 K/UL (ref 0–0.4)
EOSINOPHIL NFR BLD: 5 % (ref 0–7)
ERYTHROCYTE [DISTWIDTH] IN BLOOD BY AUTOMATED COUNT: 28.8 % (ref 11.5–14.5)
GLUCOSE SERPL-MCNC: 86 MG/DL (ref 65–100)
HCT VFR BLD AUTO: 33.3 % (ref 35–47)
HGB BLD-MCNC: 10.2 G/DL (ref 11.5–16)
IMM GRANULOCYTES # BLD AUTO: 0 K/UL (ref 0–0.04)
IMM GRANULOCYTES NFR BLD AUTO: 0 % (ref 0–0.5)
INR PPP: 1 (ref 0.9–1.1)
LYMPHOCYTES # BLD: 2 K/UL (ref 0.8–3.5)
LYMPHOCYTES NFR BLD: 36 % (ref 12–49)
MCH RBC QN AUTO: 21.3 PG (ref 26–34)
MCHC RBC AUTO-ENTMCNC: 30.6 G/DL (ref 30–36.5)
MCV RBC AUTO: 69.4 FL (ref 80–99)
MONOCYTES # BLD: 0.4 K/UL (ref 0–1)
MONOCYTES NFR BLD: 7 % (ref 5–13)
NEUTS SEG # BLD: 2.8 K/UL (ref 1.8–8)
NEUTS SEG NFR BLD: 51 % (ref 32–75)
NRBC # BLD: 0 K/UL (ref 0–0.01)
NRBC BLD-RTO: 0 PER 100 WBC
PLATELET # BLD AUTO: 431 K/UL (ref 150–400)
PMV BLD AUTO: 9 FL (ref 8.9–12.9)
POTASSIUM SERPL-SCNC: 3.6 MMOL/L (ref 3.5–5.1)
PROTHROMBIN TIME: 13 SEC (ref 11.9–14.6)
RBC # BLD AUTO: 4.8 M/UL (ref 3.8–5.2)
SODIUM SERPL-SCNC: 135 MMOL/L (ref 136–145)
WBC # BLD AUTO: 5.5 K/UL (ref 3.6–11)

## 2022-02-10 PROCEDURE — 85610 PROTHROMBIN TIME: CPT

## 2022-02-10 PROCEDURE — 99153 MOD SED SAME PHYS/QHP EA: CPT

## 2022-02-10 PROCEDURE — 36561 INSERT TUNNELED CV CATH: CPT

## 2022-02-10 PROCEDURE — 76937 US GUIDE VASCULAR ACCESS: CPT

## 2022-02-10 PROCEDURE — 77001 FLUOROGUIDE FOR VEIN DEVICE: CPT

## 2022-02-10 PROCEDURE — 36415 COLL VENOUS BLD VENIPUNCTURE: CPT

## 2022-02-10 PROCEDURE — 77002 NEEDLE LOCALIZATION BY XRAY: CPT

## 2022-02-10 PROCEDURE — 99152 MOD SED SAME PHYS/QHP 5/>YRS: CPT

## 2022-02-10 PROCEDURE — 74011250636 HC RX REV CODE- 250/636: Performed by: RADIOLOGY

## 2022-02-10 PROCEDURE — 80048 BASIC METABOLIC PNL TOTAL CA: CPT

## 2022-02-10 PROCEDURE — 85025 COMPLETE CBC W/AUTO DIFF WBC: CPT

## 2022-02-10 RX ORDER — FENTANYL CITRATE 50 UG/ML
12.5-5 INJECTION, SOLUTION INTRAMUSCULAR; INTRAVENOUS
Status: DISCONTINUED | OUTPATIENT
Start: 2022-02-10 | End: 2022-02-19 | Stop reason: HOSPADM

## 2022-02-10 RX ORDER — TRAMADOL HYDROCHLORIDE 50 MG/1
50 TABLET ORAL
COMMUNITY

## 2022-02-10 RX ORDER — CEFAZOLIN SODIUM 1 G/3ML
2 INJECTION, POWDER, FOR SOLUTION INTRAMUSCULAR; INTRAVENOUS ONCE
Status: COMPLETED | OUTPATIENT
Start: 2022-02-10 | End: 2022-02-10

## 2022-02-10 RX ORDER — MIDAZOLAM HYDROCHLORIDE 1 MG/ML
.5-1 INJECTION, SOLUTION INTRAMUSCULAR; INTRAVENOUS
Status: DISCONTINUED | OUTPATIENT
Start: 2022-02-10 | End: 2022-02-19 | Stop reason: HOSPADM

## 2022-02-10 RX ADMIN — MIDAZOLAM 1 MG: 1 INJECTION INTRAMUSCULAR; INTRAVENOUS at 10:30

## 2022-02-10 RX ADMIN — MIDAZOLAM 1 MG: 1 INJECTION INTRAMUSCULAR; INTRAVENOUS at 10:37

## 2022-02-10 RX ADMIN — FENTANYL CITRATE 50 MCG: 50 INJECTION, SOLUTION INTRAMUSCULAR; INTRAVENOUS at 10:30

## 2022-02-10 RX ADMIN — CEFAZOLIN SODIUM 2 G: 1 INJECTION, POWDER, FOR SOLUTION INTRAMUSCULAR; INTRAVENOUS at 10:25

## 2022-02-10 RX ADMIN — FENTANYL CITRATE 50 MCG: 50 INJECTION, SOLUTION INTRAMUSCULAR; INTRAVENOUS at 10:37

## 2022-02-10 NOTE — PROGRESS NOTES
Pt given discharge education verbally and gave back verbal understanding. Patient taken to ride's car via wheelchair.

## 2022-03-02 ENCOUNTER — APPOINTMENT (RX ONLY)
Dept: URBAN - METROPOLITAN AREA CLINIC 1 | Facility: CLINIC | Age: 57
Setting detail: DERMATOLOGY
End: 2022-03-02

## 2022-03-02 DIAGNOSIS — L21.8 OTHER SEBORRHEIC DERMATITIS: ICD-10-CM

## 2022-03-02 DIAGNOSIS — L65.9 NONSCARRING HAIR LOSS, UNSPECIFIED: ICD-10-CM

## 2022-03-02 PROCEDURE — ? PRESCRIPTION MEDICATION MANAGEMENT

## 2022-03-02 PROCEDURE — ? COUNSELING

## 2022-03-02 PROCEDURE — ? LAB REPORTS REVIEWED

## 2022-03-02 PROCEDURE — 99213 OFFICE O/P EST LOW 20 MIN: CPT | Mod: 25

## 2022-03-02 PROCEDURE — ? INTRALESIONAL KENALOG

## 2022-03-02 PROCEDURE — 11901 INJECT SKIN LESIONS >7: CPT

## 2022-03-02 ASSESSMENT — LOCATION SIMPLE DESCRIPTION DERM
LOCATION SIMPLE: RIGHT OCCIPITAL SCALP
LOCATION SIMPLE: POSTERIOR SCALP
LOCATION SIMPLE: LEFT SCALP
LOCATION SIMPLE: RIGHT FOREHEAD
LOCATION SIMPLE: RIGHT SCALP
LOCATION SIMPLE: LEFT FOREHEAD
LOCATION SIMPLE: SCALP
LOCATION SIMPLE: SUPERIOR FOREHEAD
LOCATION SIMPLE: ANTERIOR SCALP
LOCATION SIMPLE: LEFT OCCIPITAL SCALP

## 2022-03-02 ASSESSMENT — LOCATION DETAILED DESCRIPTION DERM
LOCATION DETAILED: MID-FRONTAL SCALP
LOCATION DETAILED: RIGHT CENTRAL FRONTAL SCALP
LOCATION DETAILED: RIGHT SUPERIOR FOREHEAD
LOCATION DETAILED: LEFT SUPERIOR PARIETAL SCALP
LOCATION DETAILED: LEFT SUPERIOR OCCIPITAL SCALP
LOCATION DETAILED: LEFT CENTRAL FRONTAL SCALP
LOCATION DETAILED: RIGHT CENTRAL PARIETAL SCALP
LOCATION DETAILED: LEFT SUPERIOR FOREHEAD
LOCATION DETAILED: POSTERIOR MID-PARIETAL SCALP
LOCATION DETAILED: SUPERIOR MID FOREHEAD
LOCATION DETAILED: LEFT CENTRAL PARIETAL SCALP
LOCATION DETAILED: RIGHT SUPERIOR OCCIPITAL SCALP
LOCATION DETAILED: RIGHT SUPERIOR PARIETAL SCALP

## 2022-03-02 ASSESSMENT — LOCATION ZONE DERM
LOCATION ZONE: FACE
LOCATION ZONE: SCALP

## 2022-03-02 NOTE — PROCEDURE: PRESCRIPTION MEDICATION MANAGEMENT
Plan: Continue ketoconozole shampoo to scalp weekly \\nContinue betamethasone to scalp QD
Render In Strict Bullet Format?: No
Detail Level: Zone
Plan: Continue betamethasone to scalp QD

## 2022-03-02 NOTE — PROCEDURE: INTRALESIONAL KENALOG
Validate Note Data When Using Inventory: Yes
Medical Necessity Clause: This procedure was medically necessary because the lesions that were treated were:
X Size Of Lesion In Cm (Optional): 0
Administered By (Optional): Naz Quinones
Consent: The risks of atrophy were reviewed with the patient.
Total Volume Injected (Ccs- Only Use Numbers And Decimals): 1.0
Concentration Of Solution Injected (Mg/Ml): 4.0
Kenalog Preparation: Kenalog with 1% lidocaine with epinephrine and a 1:10 solution of 8.4% sodium bicarbonate
Detail Level: Simple
Include Z78.9 (Other Specified Conditions Influencing Health Status) As An Associated Diagnosis?: No
Treatment Number (Optional): 1

## 2022-03-18 PROBLEM — C18.7 CANCER OF SIGMOID COLON (HCC): Status: ACTIVE | Noted: 2022-01-10

## 2022-03-18 PROBLEM — D50.9 ANEMIA, IRON DEFICIENCY: Status: ACTIVE | Noted: 2022-02-10

## 2022-03-19 PROBLEM — C78.7 METASTATIC COLON CANCER TO LIVER (HCC): Status: ACTIVE | Noted: 2022-01-10

## 2022-03-19 PROBLEM — C18.9 METASTATIC COLON CANCER TO LIVER (HCC): Status: ACTIVE | Noted: 2022-01-10

## 2022-04-01 ENCOUNTER — APPOINTMENT (RX ONLY)
Dept: URBAN - METROPOLITAN AREA CLINIC 1 | Facility: CLINIC | Age: 57
Setting detail: DERMATOLOGY
End: 2022-04-01

## 2022-04-01 DIAGNOSIS — L65.9 NONSCARRING HAIR LOSS, UNSPECIFIED: ICD-10-CM

## 2022-04-01 DIAGNOSIS — L21.8 OTHER SEBORRHEIC DERMATITIS: ICD-10-CM

## 2022-04-01 PROCEDURE — ? PRESCRIPTION MEDICATION MANAGEMENT

## 2022-04-01 PROCEDURE — 11901 INJECT SKIN LESIONS >7: CPT

## 2022-04-01 PROCEDURE — ? INTRALESIONAL KENALOG

## 2022-04-01 PROCEDURE — ? LAB REPORTS REVIEWED

## 2022-04-01 PROCEDURE — ? ORDER TESTS

## 2022-04-01 PROCEDURE — ? COUNSELING

## 2022-04-01 PROCEDURE — 99214 OFFICE O/P EST MOD 30 MIN: CPT | Mod: 25

## 2022-04-01 ASSESSMENT — LOCATION DETAILED DESCRIPTION DERM
LOCATION DETAILED: RIGHT SUPERIOR OCCIPITAL SCALP
LOCATION DETAILED: SUPERIOR MID FOREHEAD
LOCATION DETAILED: POSTERIOR MID-PARIETAL SCALP
LOCATION DETAILED: MID-FRONTAL SCALP
LOCATION DETAILED: RIGHT SUPERIOR FOREHEAD
LOCATION DETAILED: RIGHT CENTRAL PARIETAL SCALP
LOCATION DETAILED: RIGHT CENTRAL FRONTAL SCALP
LOCATION DETAILED: LEFT CENTRAL PARIETAL SCALP
LOCATION DETAILED: LEFT SUPERIOR OCCIPITAL SCALP
LOCATION DETAILED: LEFT SUPERIOR FOREHEAD
LOCATION DETAILED: LEFT SUPERIOR PARIETAL SCALP
LOCATION DETAILED: LEFT CENTRAL FRONTAL SCALP
LOCATION DETAILED: RIGHT SUPERIOR PARIETAL SCALP

## 2022-04-01 ASSESSMENT — LOCATION ZONE DERM
LOCATION ZONE: SCALP
LOCATION ZONE: FACE

## 2022-04-01 ASSESSMENT — LOCATION SIMPLE DESCRIPTION DERM
LOCATION SIMPLE: RIGHT SCALP
LOCATION SIMPLE: ANTERIOR SCALP
LOCATION SIMPLE: POSTERIOR SCALP
LOCATION SIMPLE: RIGHT OCCIPITAL SCALP
LOCATION SIMPLE: LEFT SCALP
LOCATION SIMPLE: LEFT OCCIPITAL SCALP
LOCATION SIMPLE: SUPERIOR FOREHEAD
LOCATION SIMPLE: LEFT FOREHEAD
LOCATION SIMPLE: RIGHT FOREHEAD
LOCATION SIMPLE: SCALP

## 2022-04-01 NOTE — PROCEDURE: INTRALESIONAL KENALOG
Validate Note Data When Using Inventory: Yes
Medical Necessity Clause: This procedure was medically necessary because the lesions that were treated were:
X Size Of Lesion In Cm (Optional): 0
Administered By (Optional): Naz Quinones
Consent: The risks of atrophy were reviewed with the patient.
Total Volume Injected (Ccs- Only Use Numbers And Decimals): 1.0
Concentration Of Solution Injected (Mg/Ml): 4.0
Kenalog Preparation: Kenalog with 1% lidocaine with epinephrine and a 1:10 solution of 8.4% sodium bicarbonate
Detail Level: Simple
Include Z78.9 (Other Specified Conditions Influencing Health Status) As An Associated Diagnosis?: No
Treatment Number (Optional): 2

## 2022-04-01 NOTE — PROCEDURE: ORDER TESTS
Expected Date Of Service: 04/01/2022
Performing Laboratory: 0
Bill For Surgical Tray: no
Billing Type: Third-Party Bill

## 2022-06-07 ENCOUNTER — TRANSCRIBE ORDER (OUTPATIENT)
Dept: SCHEDULING | Age: 57
End: 2022-06-07

## 2022-06-07 DIAGNOSIS — C18.7 MALIGNANT NEOPLASM OF SIGMOID COLON (HCC): Primary | ICD-10-CM

## 2022-06-07 DIAGNOSIS — R10.9 STOMACH ACHE: ICD-10-CM

## 2022-06-07 DIAGNOSIS — C78.7 SECONDARY MALIGNANT NEOPLASM OF LIVER (HCC): ICD-10-CM

## 2022-07-08 ENCOUNTER — APPOINTMENT (RX ONLY)
Dept: URBAN - METROPOLITAN AREA CLINIC 1 | Facility: CLINIC | Age: 57
Setting detail: DERMATOLOGY
End: 2022-07-08

## 2022-07-08 DIAGNOSIS — L30.1 DYSHIDROSIS [POMPHOLYX]: ICD-10-CM

## 2022-07-08 DIAGNOSIS — L21.8 OTHER SEBORRHEIC DERMATITIS: ICD-10-CM

## 2022-07-08 DIAGNOSIS — L65.9 NONSCARRING HAIR LOSS, UNSPECIFIED: ICD-10-CM

## 2022-07-08 PROCEDURE — 99213 OFFICE O/P EST LOW 20 MIN: CPT | Mod: 25

## 2022-07-08 PROCEDURE — ? COUNSELING

## 2022-07-08 PROCEDURE — ? PRESCRIPTION

## 2022-07-08 PROCEDURE — 11901 INJECT SKIN LESIONS >7: CPT

## 2022-07-08 PROCEDURE — ? PRESCRIPTION MEDICATION MANAGEMENT

## 2022-07-08 PROCEDURE — ? INTRALESIONAL KENALOG

## 2022-07-08 RX ORDER — KETOCONAZOLE 20 MG/ML
SHAMPOO, SUSPENSION TOPICAL
Qty: 120 | Refills: 5 | Status: ERX

## 2022-07-08 RX ORDER — CLOBETASOL PROPIONATE 0.5 MG/G
CREAM TOPICAL
Qty: 60 | Refills: 1 | Status: ERX | COMMUNITY
Start: 2022-07-08

## 2022-07-08 RX ADMIN — CLOBETASOL PROPIONATE: 0.5 CREAM TOPICAL at 00:00

## 2022-07-08 ASSESSMENT — LOCATION DETAILED DESCRIPTION DERM
LOCATION DETAILED: LEFT SUPERIOR PARIETAL SCALP
LOCATION DETAILED: LEFT CENTRAL PARIETAL SCALP
LOCATION DETAILED: RIGHT SUPERIOR PARIETAL SCALP
LOCATION DETAILED: MID-FRONTAL SCALP
LOCATION DETAILED: POSTERIOR MID-PARIETAL SCALP
LOCATION DETAILED: RIGHT CENTRAL PARIETAL SCALP

## 2022-07-08 ASSESSMENT — LOCATION SIMPLE DESCRIPTION DERM
LOCATION SIMPLE: ANTERIOR SCALP
LOCATION SIMPLE: SCALP
LOCATION SIMPLE: POSTERIOR SCALP

## 2022-07-08 ASSESSMENT — LOCATION ZONE DERM: LOCATION ZONE: SCALP

## 2022-07-08 NOTE — PROCEDURE: PRESCRIPTION MEDICATION MANAGEMENT
Plan: Continue ketoconozole shampoo to scalp weekly \\nContinue betamethasone to scalp QD\\n\\nDiscussed options of how to style and protect hair.
Render In Strict Bullet Format?: No
Detail Level: Zone
Plan: Continue betamethasone to scalp QD

## 2022-07-08 NOTE — PROCEDURE: INTRALESIONAL KENALOG
Kenalog Preparation: Kenalog
Validate Note Data When Using Inventory: Yes
Detail Level: Detailed
Medical Necessity Clause: This procedure was medically necessary because the lesions that were treated were:
Include Z78.9 (Other Specified Conditions Influencing Health Status) As An Associated Diagnosis?: No
X Size Of Lesion In Cm (Optional): 0
Consent: The risks of atrophy were reviewed with the patient.
Total Volume Injected (Ccs- Only Use Numbers And Decimals): 1.0
Concentration Of Solution Injected (Mg/Ml): 4.0

## 2022-07-15 ENCOUNTER — HOSPITAL ENCOUNTER (OUTPATIENT)
Dept: CT IMAGING | Age: 57
Discharge: HOME | End: 2022-07-15
Attending: INTERNAL MEDICINE
Payer: MEDICAID

## 2022-07-15 DIAGNOSIS — C18.7 MALIGNANT NEOPLASM OF SIGMOID COLON (HCC): ICD-10-CM

## 2022-07-15 DIAGNOSIS — C78.7 SECONDARY MALIGNANT NEOPLASM OF LIVER (HCC): ICD-10-CM

## 2022-07-15 DIAGNOSIS — R10.9 STOMACH ACHE: ICD-10-CM

## 2022-07-15 PROCEDURE — 74011000636 HC RX REV CODE- 636: Performed by: INTERNAL MEDICINE

## 2022-07-15 PROCEDURE — 74177 CT ABD & PELVIS W/CONTRAST: CPT

## 2022-07-15 RX ADMIN — IOPAMIDOL 100 ML: 755 INJECTION, SOLUTION INTRAVENOUS at 13:45

## 2022-09-01 ENCOUNTER — TRANSCRIBE ORDER (OUTPATIENT)
Dept: SCHEDULING | Age: 57
End: 2022-09-01

## 2022-09-01 DIAGNOSIS — Z12.31 SCREENING MAMMOGRAM FOR HIGH-RISK PATIENT: Primary | ICD-10-CM

## 2022-09-08 ENCOUNTER — HOSPITAL ENCOUNTER (OUTPATIENT)
Dept: MAMMOGRAPHY | Age: 57
Discharge: HOME | End: 2022-09-08
Payer: MEDICAID

## 2022-09-08 DIAGNOSIS — Z12.31 SCREENING MAMMOGRAM FOR HIGH-RISK PATIENT: ICD-10-CM

## 2022-09-08 PROCEDURE — 77063 BREAST TOMOSYNTHESIS BI: CPT

## 2022-10-26 ENCOUNTER — APPOINTMENT (RX ONLY)
Dept: URBAN - METROPOLITAN AREA CLINIC 1 | Facility: CLINIC | Age: 57
Setting detail: DERMATOLOGY
End: 2022-10-26

## 2022-10-26 ENCOUNTER — RX ONLY (OUTPATIENT)
Age: 57
Setting detail: RX ONLY
End: 2022-10-26

## 2022-10-26 DIAGNOSIS — L21.8 OTHER SEBORRHEIC DERMATITIS: ICD-10-CM

## 2022-10-26 DIAGNOSIS — L65.9 NONSCARRING HAIR LOSS, UNSPECIFIED: ICD-10-CM

## 2022-10-26 DIAGNOSIS — L30.1 DYSHIDROSIS [POMPHOLYX]: ICD-10-CM

## 2022-10-26 PROCEDURE — ? PRESCRIPTION

## 2022-10-26 PROCEDURE — ? TREATMENT REGIMEN

## 2022-10-26 PROCEDURE — ? INTRALESIONAL KENALOG

## 2022-10-26 PROCEDURE — 11901 INJECT SKIN LESIONS >7: CPT

## 2022-10-26 PROCEDURE — ? PRESCRIPTION MEDICATION MANAGEMENT

## 2022-10-26 PROCEDURE — ? COUNSELING

## 2022-10-26 PROCEDURE — 99213 OFFICE O/P EST LOW 20 MIN: CPT | Mod: 25

## 2022-10-26 RX ORDER — BETAMETHASONE DIPROPIONATE 0.5 MG/G
OINTMENT TOPICAL
Qty: 45 | Refills: 3 | Status: ERX

## 2022-10-26 RX ORDER — KETOCONAZOLE 20 MG/ML
SHAMPOO, SUSPENSION TOPICAL
Qty: 120 | Refills: 5 | Status: ERX

## 2022-10-26 RX ORDER — CLOBETASOL PROPIONATE 0.5 MG/G
CREAM TOPICAL
Qty: 60 | Refills: 1 | Status: ERX

## 2022-10-26 ASSESSMENT — LOCATION DETAILED DESCRIPTION DERM
LOCATION DETAILED: RIGHT LATERAL FRONTAL SCALP
LOCATION DETAILED: RIGHT MEDIAL FRONTAL SCALP
LOCATION DETAILED: RIGHT SUPERIOR FOREHEAD
LOCATION DETAILED: LEFT CENTRAL FRONTAL SCALP
LOCATION DETAILED: LEFT SUPERIOR PARIETAL SCALP
LOCATION DETAILED: LEFT SUPERIOR FOREHEAD
LOCATION DETAILED: RIGHT CENTRAL FRONTAL SCALP
LOCATION DETAILED: RIGHT SUPERIOR PARIETAL SCALP
LOCATION DETAILED: MID-FRONTAL SCALP
LOCATION DETAILED: LEFT MEDIAL FRONTAL SCALP
LOCATION DETAILED: RIGHT SUPERIOR MEDIAL FOREHEAD
LOCATION DETAILED: RIGHT CENTRAL PARIETAL SCALP
LOCATION DETAILED: LEFT CENTRAL PARIETAL SCALP

## 2022-10-26 ASSESSMENT — LOCATION SIMPLE DESCRIPTION DERM
LOCATION SIMPLE: LEFT SCALP
LOCATION SIMPLE: SCALP
LOCATION SIMPLE: RIGHT FOREHEAD
LOCATION SIMPLE: LEFT FOREHEAD
LOCATION SIMPLE: ANTERIOR SCALP
LOCATION SIMPLE: RIGHT SCALP

## 2022-10-26 ASSESSMENT — LOCATION ZONE DERM
LOCATION ZONE: SCALP
LOCATION ZONE: FACE

## 2022-10-26 NOTE — PROCEDURE: PRESCRIPTION MEDICATION MANAGEMENT
Render In Strict Bullet Format?: No
Detail Level: Zone
Continue Regimen: betamethasone dipropionate 0.05 % topical ointment: Apply to the scalp once weekly for itch and flake.\\n\\nketoconozole shampoo to scalp weekly

## 2022-10-26 NOTE — PROCEDURE: TREATMENT REGIMEN
Initiate Treatment: Betametasone oint to hands bid prn flares
Detail Level: Zone
Plan: Maintenance ILK 1/3 done today
Continue Regimen: Betametasone oint to scalp \\n\\nNutrafol vitamins bid

## 2022-10-26 NOTE — PROCEDURE: INTRALESIONAL KENALOG
Include Z78.9 (Other Specified Conditions Influencing Health Status) As An Associated Diagnosis?: No
Total Volume Injected (Ccs- Only Use Numbers And Decimals): 1.0
Kenalog Preparation: Kenalog
Concentration Of Solution Injected (Mg/Ml): 4.0
Consent: The risks of atrophy were reviewed with the patient.
Validate Note Data When Using Inventory: Yes
How Many Mls Were Removed From The 40 Mg/Ml (10ml) Vial When Preparing The Injectable Solution?: 0
Detail Level: Simple
Treatment Number (Optional): 1
Medical Necessity Clause: This procedure was medically necessary because the lesions that were treated were:
Administered By (Optional): Naz Quinones

## 2023-01-10 ENCOUNTER — TRANSCRIBE ORDER (OUTPATIENT)
Dept: SCHEDULING | Age: 58
End: 2023-01-10

## 2023-01-10 DIAGNOSIS — C18.7 MALIGNANT NEOPLASM OF SIGMOID COLON (HCC): ICD-10-CM

## 2023-01-10 DIAGNOSIS — D50.9 IRON DEFICIENCY ANEMIA, UNSPECIFIED: Primary | ICD-10-CM

## 2023-02-03 ENCOUNTER — TRANSCRIBE ORDER (OUTPATIENT)
Dept: SCHEDULING | Age: 58
End: 2023-02-03

## 2023-02-06 ENCOUNTER — HOSPITAL ENCOUNTER (OUTPATIENT)
Dept: PET IMAGING | Age: 58
Discharge: HOME | End: 2023-02-06
Attending: INTERNAL MEDICINE
Payer: MEDICAID

## 2023-02-06 DIAGNOSIS — D50.9 IRON DEFICIENCY ANEMIA, UNSPECIFIED: ICD-10-CM

## 2023-02-06 DIAGNOSIS — C18.7 MALIGNANT NEOPLASM OF SIGMOID COLON (HCC): ICD-10-CM

## 2023-02-06 PROCEDURE — 78815 PET IMAGE W/CT SKULL-THIGH: CPT

## 2023-02-06 RX ORDER — FLUDEOXYGLUCOSE F-18 200 MCI/ML
10 INJECTION INTRAVENOUS ONCE
Status: COMPLETED | OUTPATIENT
Start: 2023-02-06 | End: 2023-02-06

## 2023-02-06 RX ADMIN — FLUDEOXYGLUCOSE F-18 10 MILLICURIE: 200 INJECTION INTRAVENOUS at 11:09

## 2023-02-15 ENCOUNTER — APPOINTMENT (RX ONLY)
Dept: URBAN - METROPOLITAN AREA CLINIC 1 | Facility: CLINIC | Age: 58
Setting detail: DERMATOLOGY
End: 2023-02-15

## 2023-02-15 DIAGNOSIS — L65.9 NONSCARRING HAIR LOSS, UNSPECIFIED: ICD-10-CM

## 2023-02-15 DIAGNOSIS — L21.8 OTHER SEBORRHEIC DERMATITIS: ICD-10-CM

## 2023-02-15 DIAGNOSIS — L30.1 DYSHIDROSIS [POMPHOLYX]: ICD-10-CM

## 2023-02-15 PROCEDURE — ? COUNSELING

## 2023-02-15 PROCEDURE — ? PRESCRIPTION MEDICATION MANAGEMENT

## 2023-02-15 PROCEDURE — ? INTRALESIONAL KENALOG

## 2023-02-15 PROCEDURE — ? TREATMENT REGIMEN

## 2023-02-15 PROCEDURE — ? PRESCRIPTION

## 2023-02-15 PROCEDURE — 99213 OFFICE O/P EST LOW 20 MIN: CPT | Mod: 25

## 2023-02-15 PROCEDURE — 11901 INJECT SKIN LESIONS >7: CPT

## 2023-02-15 RX ORDER — BETAMETHASONE DIPROPIONATE 0.5 MG/G
OINTMENT TOPICAL
Qty: 45 | Refills: 3 | Status: ERX

## 2023-02-15 RX ORDER — KETOCONAZOLE 20 MG/ML
SHAMPOO, SUSPENSION TOPICAL
Qty: 120 | Refills: 5 | Status: ERX

## 2023-02-15 ASSESSMENT — LOCATION ZONE DERM
LOCATION ZONE: SCALP
LOCATION ZONE: FACE

## 2023-02-15 ASSESSMENT — LOCATION DETAILED DESCRIPTION DERM
LOCATION DETAILED: RIGHT INFERIOR FRONTAL SCALP
LOCATION DETAILED: LEFT SUPERIOR FRONTAL SCALP
LOCATION DETAILED: LEFT SUPERIOR PARIETAL SCALP
LOCATION DETAILED: LEFT CENTRAL FRONTAL SCALP
LOCATION DETAILED: RIGHT CENTRAL PARIETAL SCALP
LOCATION DETAILED: LEFT INFERIOR OCCIPITAL SCALP
LOCATION DETAILED: RIGHT SUPERIOR LATERAL FOREHEAD
LOCATION DETAILED: POSTERIOR MID-PARIETAL SCALP
LOCATION DETAILED: RIGHT CENTRAL FRONTAL SCALP
LOCATION DETAILED: RIGHT FOREHEAD
LOCATION DETAILED: MID-FRONTAL SCALP
LOCATION DETAILED: RIGHT MEDIAL FOREHEAD
LOCATION DETAILED: RIGHT SUPERIOR FOREHEAD
LOCATION DETAILED: LEFT INFERIOR PARIETAL SCALP
LOCATION DETAILED: LEFT MEDIAL FOREHEAD
LOCATION DETAILED: RIGHT INFERIOR OCCIPITAL SCALP
LOCATION DETAILED: INFERIOR MID FOREHEAD
LOCATION DETAILED: RIGHT SUPERIOR FRONTAL SCALP
LOCATION DETAILED: RIGHT SUPERIOR PARIETAL SCALP
LOCATION DETAILED: RIGHT LATERAL FOREHEAD
LOCATION DETAILED: RIGHT INFERIOR PARIETAL SCALP
LOCATION DETAILED: LEFT CENTRAL OCCIPITAL SCALP
LOCATION DETAILED: SUPERIOR MID FOREHEAD
LOCATION DETAILED: LEFT CENTRAL PARIETAL SCALP

## 2023-02-15 ASSESSMENT — LOCATION SIMPLE DESCRIPTION DERM
LOCATION SIMPLE: SUPERIOR FOREHEAD
LOCATION SIMPLE: POSTERIOR SCALP
LOCATION SIMPLE: ANTERIOR SCALP
LOCATION SIMPLE: INFERIOR FOREHEAD
LOCATION SIMPLE: SCALP
LOCATION SIMPLE: RIGHT FOREHEAD
LOCATION SIMPLE: LEFT FOREHEAD

## 2023-02-15 NOTE — PROCEDURE: TREATMENT REGIMEN
Detail Level: Zone
Plan: Maintenance ILK 2/3 done today\\nPatient extremely emotional, complains of high stress work and personal life.\\nPatient will begin Rogaine otc on scalp.
Initiate Treatment: Rogaine otc
Continue Regimen: Betamethasone oint to scalp
Continue Regimen: Betamethasone BID on hands for flares using occlusion method
Plan: Patient informed on occlusion process for increased absorption of betamethasone on hands.

## 2023-02-15 NOTE — PROCEDURE: INTRALESIONAL KENALOG
Administered By (Optional): Naz Quinones
Detail Level: Simple
Consent: The risks of atrophy were reviewed with the patient.
Which Kenalog Vial Was Used?: Kenalog 40 mg/ml (1 ml vial)
Kenalog Preparation: Kenalog
Concentration Of Solution Injected (Mg/Ml): 4.0
Validate Note Data When Using Inventory: Yes
Medical Necessity Clause: This procedure was medically necessary because the lesions that were treated were:
How Many Mls Were Removed From The 80 Mg/Ml (5ml) Vial When Preparing The Injectable Solution?: 0
Include Z78.9 (Other Specified Conditions Influencing Health Status) As An Associated Diagnosis?: No
Total Volume Injected (Ccs- Only Use Numbers And Decimals): 1.0

## 2023-04-05 ENCOUNTER — APPOINTMENT (RX ONLY)
Dept: URBAN - METROPOLITAN AREA CLINIC 1 | Facility: CLINIC | Age: 58
Setting detail: DERMATOLOGY
End: 2023-04-05

## 2023-04-05 DIAGNOSIS — L65.9 NONSCARRING HAIR LOSS, UNSPECIFIED: ICD-10-CM

## 2023-04-05 DIAGNOSIS — L21.8 OTHER SEBORRHEIC DERMATITIS: ICD-10-CM

## 2023-04-05 DIAGNOSIS — L30.1 DYSHIDROSIS [POMPHOLYX]: ICD-10-CM

## 2023-04-05 PROCEDURE — ? PRESCRIPTION MEDICATION MANAGEMENT

## 2023-04-05 PROCEDURE — 11901 INJECT SKIN LESIONS >7: CPT

## 2023-04-05 PROCEDURE — ? INTRALESIONAL KENALOG

## 2023-04-05 PROCEDURE — 99213 OFFICE O/P EST LOW 20 MIN: CPT | Mod: 25

## 2023-04-05 PROCEDURE — ? TREATMENT REGIMEN

## 2023-04-05 PROCEDURE — ? PRESCRIPTION

## 2023-04-05 PROCEDURE — ? COUNSELING

## 2023-04-05 RX ORDER — BETAMETHASONE DIPROPIONATE 0.5 MG/G
OINTMENT TOPICAL
Qty: 45 | Refills: 3 | Status: ERX

## 2023-04-05 RX ORDER — KETOCONAZOLE 20 MG/ML
SHAMPOO, SUSPENSION TOPICAL
Qty: 120 | Refills: 5 | Status: ERX

## 2023-04-05 ASSESSMENT — LOCATION SIMPLE DESCRIPTION DERM
LOCATION SIMPLE: SUPERIOR FOREHEAD
LOCATION SIMPLE: RIGHT FOREHEAD
LOCATION SIMPLE: INFERIOR FOREHEAD
LOCATION SIMPLE: LEFT FOREHEAD
LOCATION SIMPLE: SCALP
LOCATION SIMPLE: POSTERIOR SCALP
LOCATION SIMPLE: ANTERIOR SCALP

## 2023-04-05 ASSESSMENT — LOCATION DETAILED DESCRIPTION DERM
LOCATION DETAILED: RIGHT SUPERIOR LATERAL FOREHEAD
LOCATION DETAILED: MID-FRONTAL SCALP
LOCATION DETAILED: RIGHT INFERIOR FRONTAL SCALP
LOCATION DETAILED: RIGHT INFERIOR PARIETAL SCALP
LOCATION DETAILED: LEFT SUPERIOR PARIETAL SCALP
LOCATION DETAILED: SUPERIOR MID FOREHEAD
LOCATION DETAILED: INFERIOR MID FOREHEAD
LOCATION DETAILED: RIGHT SUPERIOR FRONTAL SCALP
LOCATION DETAILED: RIGHT CENTRAL PARIETAL SCALP
LOCATION DETAILED: LEFT INFERIOR OCCIPITAL SCALP
LOCATION DETAILED: RIGHT INFERIOR OCCIPITAL SCALP
LOCATION DETAILED: RIGHT MEDIAL FOREHEAD
LOCATION DETAILED: LEFT INFERIOR PARIETAL SCALP
LOCATION DETAILED: LEFT CENTRAL FRONTAL SCALP
LOCATION DETAILED: RIGHT SUPERIOR PARIETAL SCALP
LOCATION DETAILED: LEFT CENTRAL PARIETAL SCALP
LOCATION DETAILED: POSTERIOR MID-PARIETAL SCALP
LOCATION DETAILED: LEFT CENTRAL OCCIPITAL SCALP
LOCATION DETAILED: LEFT SUPERIOR FRONTAL SCALP
LOCATION DETAILED: RIGHT FOREHEAD
LOCATION DETAILED: RIGHT LATERAL FOREHEAD
LOCATION DETAILED: RIGHT CENTRAL FRONTAL SCALP
LOCATION DETAILED: LEFT MEDIAL FOREHEAD
LOCATION DETAILED: RIGHT SUPERIOR FOREHEAD

## 2023-04-05 ASSESSMENT — LOCATION ZONE DERM
LOCATION ZONE: FACE
LOCATION ZONE: SCALP

## 2023-04-05 NOTE — PROCEDURE: TREATMENT REGIMEN
Detail Level: Zone
Plan: Maintenance ILK 3/3 done today\\nPatient extremely emotional, complains of high stress work and personal life.\\nPatient will continue Rogaine otc on scalp.
Continue Regimen: Betamethasone oint to scalp\\n\\nRogaine otc
Continue Regimen: Betamethasone BID on hands for flares using occlusion method
Otc Regimen: Amlactin
Plan: Patient informed on occlusion process for increased absorption of betamethasone on hands.

## 2023-04-18 ENCOUNTER — TRANSCRIBE ORDER (OUTPATIENT)
Dept: SCHEDULING | Age: 58
End: 2023-04-18

## 2023-04-18 DIAGNOSIS — D50.9 IRON DEFICIENCY ANEMIA, UNSPECIFIED: Primary | ICD-10-CM

## 2023-04-18 DIAGNOSIS — C18.7 MALIGNANT NEOPLASM OF SIGMOID COLON (HCC): ICD-10-CM

## 2023-05-05 ENCOUNTER — HOSPITAL ENCOUNTER (OUTPATIENT)
Dept: CT IMAGING | Age: 58
Discharge: HOME | End: 2023-05-04
Attending: INTERNAL MEDICINE

## 2023-05-05 ENCOUNTER — TRANSCRIBE ORDERS (OUTPATIENT)
Facility: HOSPITAL | Age: 58
End: 2023-05-05

## 2023-05-05 DIAGNOSIS — D50.9 IRON DEFICIENCY ANEMIA, UNSPECIFIED IRON DEFICIENCY ANEMIA TYPE: Primary | ICD-10-CM

## 2023-05-05 DIAGNOSIS — D50.9 IRON DEFICIENCY ANEMIA, UNSPECIFIED: ICD-10-CM

## 2023-05-05 DIAGNOSIS — C18.7 MALIGNANT NEOPLASM OF SIGMOID COLON (HCC): ICD-10-CM

## 2023-05-24 ENCOUNTER — HOSPITAL ENCOUNTER (OUTPATIENT)
Facility: HOSPITAL | Age: 58
Discharge: HOME | End: 2023-05-27
Payer: COMMERCIAL

## 2023-05-24 DIAGNOSIS — D50.9 IRON DEFICIENCY ANEMIA, UNSPECIFIED IRON DEFICIENCY ANEMIA TYPE: ICD-10-CM

## 2023-05-24 DIAGNOSIS — C18.7 MALIGNANT NEOPLASM OF SIGMOID COLON (HCC): ICD-10-CM

## 2023-05-24 PROCEDURE — 74177 CT ABD & PELVIS W/CONTRAST: CPT

## 2023-05-24 PROCEDURE — 6360000004 HC RX CONTRAST MEDICATION: Performed by: INTERNAL MEDICINE

## 2023-05-24 RX ADMIN — IOPAMIDOL 100 ML: 755 INJECTION, SOLUTION INTRAVENOUS at 09:59

## 2023-07-18 ENCOUNTER — APPOINTMENT (RX ONLY)
Dept: URBAN - METROPOLITAN AREA CLINIC 1 | Facility: CLINIC | Age: 58
Setting detail: DERMATOLOGY
End: 2023-07-18

## 2023-07-18 DIAGNOSIS — L21.8 OTHER SEBORRHEIC DERMATITIS: ICD-10-CM | Status: WELL CONTROLLED

## 2023-07-18 DIAGNOSIS — L65.9 NONSCARRING HAIR LOSS, UNSPECIFIED: ICD-10-CM | Status: IMPROVED

## 2023-07-18 PROCEDURE — ? PRESCRIPTION MEDICATION MANAGEMENT

## 2023-07-18 PROCEDURE — ? TREATMENT REGIMEN

## 2023-07-18 PROCEDURE — ? COUNSELING

## 2023-07-18 PROCEDURE — 99213 OFFICE O/P EST LOW 20 MIN: CPT

## 2023-07-18 ASSESSMENT — LOCATION DETAILED DESCRIPTION DERM
LOCATION DETAILED: RIGHT SUPERIOR PARIETAL SCALP
LOCATION DETAILED: MID-FRONTAL SCALP

## 2023-07-18 ASSESSMENT — LOCATION SIMPLE DESCRIPTION DERM
LOCATION SIMPLE: ANTERIOR SCALP
LOCATION SIMPLE: SCALP

## 2023-07-18 ASSESSMENT — LOCATION ZONE DERM: LOCATION ZONE: SCALP

## 2023-07-18 NOTE — PROCEDURE: PRESCRIPTION MEDICATION MANAGEMENT
Plan: Patient admits to using fenugreek oil tiw, taking Nutraful supplements qd and using Nexxus shampoo and conditioner qw. \\n\\nAdmits to noticing treating improvement with itching of scalp.
Render In Strict Bullet Format?: No
Detail Level: Zone
Continue Regimen: betamethasone dipropionate 0.05 % topical ointment: Apply to the scalp once weekly for itch and flake.\\n\\nketoconozole shampoo to scalp weekly

## 2023-07-18 NOTE — PROCEDURE: TREATMENT REGIMEN
Detail Level: Zone
Plan: Maintenance ILK 3/3 done 4/5/2023\\n\\nS/p 3 months rogaine 5% liquid to scalp tiw. \\nPatient admits to applying betamethsone ointment to scalp qw\\nPatient admits to noticing great improvement with growth, admits she would like to increase hair density\\n\\nWill continue with regimen and follow up with Dr. Quinones in 3 months.
Continue Regimen: Betamethasone oint to scalp - qw\\n\\nRogaine 5% - apply to scalp tiw

## 2023-08-29 ENCOUNTER — HOSPITAL ENCOUNTER (OUTPATIENT)
Facility: HOSPITAL | Age: 58
Discharge: HOME | End: 2023-09-01
Payer: COMMERCIAL

## 2023-08-29 VITALS — BODY MASS INDEX: 35.95 KG/M2 | WEIGHT: 178 LBS

## 2023-08-29 DIAGNOSIS — C18.7 MALIGNANT NEOPLASM OF SIGMOID COLON (HCC): ICD-10-CM

## 2023-08-29 DIAGNOSIS — D50.9 IRON DEFICIENCY ANEMIA, UNSPECIFIED IRON DEFICIENCY ANEMIA TYPE: ICD-10-CM

## 2023-08-29 LAB
GLUCOSE BLD STRIP.AUTO-MCNC: 113 MG/DL (ref 65–117)
SERVICE CMNT-IMP: NORMAL

## 2023-08-29 PROCEDURE — 82962 GLUCOSE BLOOD TEST: CPT

## 2023-08-29 PROCEDURE — 78815 PET IMAGE W/CT SKULL-THIGH: CPT

## 2023-08-29 PROCEDURE — A9552 F18 FDG: HCPCS | Performed by: INTERNAL MEDICINE

## 2023-08-29 PROCEDURE — 3430000000 HC RX DIAGNOSTIC RADIOPHARMACEUTICAL: Performed by: INTERNAL MEDICINE

## 2023-08-29 RX ORDER — FLUDEOXYGLUCOSE F-18 500 MCI/ML
10 INJECTION INTRAVENOUS
Status: COMPLETED | OUTPATIENT
Start: 2023-08-29 | End: 2023-08-29

## 2023-08-29 RX ADMIN — FLUDEOXYGLUCOSE F-18 10 MILLICURIE: 500 INJECTION INTRAVENOUS at 08:21

## 2023-10-11 ENCOUNTER — APPOINTMENT (RX ONLY)
Dept: URBAN - METROPOLITAN AREA CLINIC 1 | Facility: CLINIC | Age: 58
Setting detail: DERMATOLOGY
End: 2023-10-11

## 2023-10-11 DIAGNOSIS — L21.8 OTHER SEBORRHEIC DERMATITIS: ICD-10-CM | Status: STABLE

## 2023-10-11 DIAGNOSIS — L65.9 NONSCARRING HAIR LOSS, UNSPECIFIED: ICD-10-CM | Status: STABLE

## 2023-10-11 PROCEDURE — ? INTRALESIONAL KENALOG

## 2023-10-11 PROCEDURE — ? PRESCRIPTION

## 2023-10-11 PROCEDURE — 99213 OFFICE O/P EST LOW 20 MIN: CPT | Mod: 25

## 2023-10-11 PROCEDURE — ? TREATMENT REGIMEN

## 2023-10-11 PROCEDURE — ? COUNSELING

## 2023-10-11 PROCEDURE — ? PRESCRIPTION MEDICATION MANAGEMENT

## 2023-10-11 PROCEDURE — 11901 INJECT SKIN LESIONS >7: CPT

## 2023-10-11 RX ORDER — MINOXIDIL
POWDER (GRAM) MISCELLANEOUS
Qty: 100 | Refills: 1 | Status: ERX | COMMUNITY
Start: 2023-10-11

## 2023-10-11 RX ADMIN — Medication: at 00:00

## 2023-10-11 ASSESSMENT — LOCATION SIMPLE DESCRIPTION DERM
LOCATION SIMPLE: SCALP
LOCATION SIMPLE: LEFT SCALP
LOCATION SIMPLE: POSTERIOR SCALP
LOCATION SIMPLE: RIGHT FOREHEAD
LOCATION SIMPLE: LEFT FOREHEAD
LOCATION SIMPLE: SUPERIOR FOREHEAD
LOCATION SIMPLE: INFERIOR FOREHEAD
LOCATION SIMPLE: RIGHT SCALP
LOCATION SIMPLE: ANTERIOR SCALP

## 2023-10-11 ASSESSMENT — LOCATION DETAILED DESCRIPTION DERM
LOCATION DETAILED: RIGHT INFERIOR POSTAURICULAR SKIN
LOCATION DETAILED: LEFT LATERAL FRONTAL SCALP
LOCATION DETAILED: LEFT CENTRAL PARIETAL SCALP
LOCATION DETAILED: RIGHT SUPERIOR POSTAURICULAR SKIN
LOCATION DETAILED: RIGHT SUPERIOR LATERAL FOREHEAD
LOCATION DETAILED: LEFT SUPERIOR POSTAURICULAR SKIN
LOCATION DETAILED: RIGHT INFERIOR OCCIPITAL SCALP
LOCATION DETAILED: POSTERIOR MID-PARIETAL SCALP
LOCATION DETAILED: SUPERIOR MID FOREHEAD
LOCATION DETAILED: RIGHT SUPERIOR PARIETAL SCALP
LOCATION DETAILED: RIGHT SUPERIOR FOREHEAD
LOCATION DETAILED: LEFT SUPERIOR OCCIPITAL SCALP
LOCATION DETAILED: LEFT CENTRAL FRONTAL SCALP
LOCATION DETAILED: RIGHT CENTRAL PARIETAL SCALP
LOCATION DETAILED: INFERIOR MID FOREHEAD
LOCATION DETAILED: LEFT INFERIOR OCCIPITAL SCALP
LOCATION DETAILED: RIGHT SUPERIOR OCCIPITAL SCALP
LOCATION DETAILED: RIGHT CENTRAL FRONTAL SCALP
LOCATION DETAILED: MID-OCCIPITAL SCALP
LOCATION DETAILED: LEFT SUPERIOR FOREHEAD
LOCATION DETAILED: MID-FRONTAL SCALP

## 2023-10-11 ASSESSMENT — LOCATION ZONE DERM
LOCATION ZONE: SCALP
LOCATION ZONE: FACE

## 2023-10-11 NOTE — PROCEDURE: PRESCRIPTION MEDICATION MANAGEMENT
Plan: Patient admits to using fenugreek oil tiw, taking Nutraful supplements qd and using Nexxus shampoo and conditioner qw. \\n\\nAdmits to noticing treating improvement with itching of scalp. Patient admits itch is stable and 1/10 itch factor at most. \\nIncreasing betamethasone application to increase results associated with alopecia
Render In Strict Bullet Format?: No
Detail Level: Zone
Continue Regimen: ketoconozole shampoo to scalp weekly
Modify Regimen: betamethasone dipropionate 0.05 % topical ointment: Apply to the scalp Tiw for itch and flake.
Initiate Treatment: minoxidil 8%, 0.05% Fluocinonide (bulk) in castor oil , apply to scalp qd

## 2023-10-11 NOTE — PROCEDURE: TREATMENT REGIMEN
Discontinue Regimen: Rogaine 5% - apply to scalp tiw
Detail Level: Zone
Plan: Maintenance ILK administered today, will initiate 3/3 every 3 months \\n\\nS/p 6 months rogaine 5% liquid to scalp tiw. \\nPatient admits to applying betamethsone ointment to scalp qw, will increase to tiw\\nPatient admits to noticing great improvement with growth, admits she would like to increase hair density
Initiate Treatment: Minoxidil 8% liquid - apply to scalp qd
Modify Regimen: Betamethasone oint to scalp - tiw

## 2023-12-21 ENCOUNTER — HOSPITAL ENCOUNTER (OUTPATIENT)
Facility: HOSPITAL | Age: 58
Discharge: HOME | End: 2023-12-24
Attending: INTERNAL MEDICINE
Payer: COMMERCIAL

## 2023-12-21 DIAGNOSIS — C18.7 MALIGNANT NEOPLASM OF SIGMOID COLON (HCC): ICD-10-CM

## 2023-12-21 DIAGNOSIS — D50.9 IRON DEFICIENCY ANEMIA, UNSPECIFIED IRON DEFICIENCY ANEMIA TYPE: ICD-10-CM

## 2023-12-21 PROCEDURE — 74177 CT ABD & PELVIS W/CONTRAST: CPT

## 2023-12-21 PROCEDURE — 6360000004 HC RX CONTRAST MEDICATION: Performed by: INTERNAL MEDICINE

## 2023-12-21 RX ADMIN — IOPAMIDOL 100 ML: 755 INJECTION, SOLUTION INTRAVENOUS at 17:04

## 2023-12-27 ENCOUNTER — RX ONLY (OUTPATIENT)
Age: 58
Setting detail: RX ONLY
End: 2023-12-27

## 2023-12-27 ENCOUNTER — APPOINTMENT (RX ONLY)
Dept: URBAN - METROPOLITAN AREA CLINIC 1 | Facility: CLINIC | Age: 58
Setting detail: DERMATOLOGY
End: 2023-12-27

## 2023-12-27 DIAGNOSIS — L21.8 OTHER SEBORRHEIC DERMATITIS: ICD-10-CM | Status: INADEQUATELY CONTROLLED

## 2023-12-27 DIAGNOSIS — L65.9 NONSCARRING HAIR LOSS, UNSPECIFIED: ICD-10-CM | Status: INADEQUATELY CONTROLLED

## 2023-12-27 DIAGNOSIS — L81.4 OTHER MELANIN HYPERPIGMENTATION: ICD-10-CM

## 2023-12-27 PROCEDURE — ? PRESCRIPTION

## 2023-12-27 PROCEDURE — ? PRESCRIPTION MEDICATION MANAGEMENT

## 2023-12-27 PROCEDURE — ? COUNSELING

## 2023-12-27 PROCEDURE — 11901 INJECT SKIN LESIONS >7: CPT

## 2023-12-27 PROCEDURE — ? INTRALESIONAL KENALOG

## 2023-12-27 PROCEDURE — 99214 OFFICE O/P EST MOD 30 MIN: CPT | Mod: 25

## 2023-12-27 RX ORDER — KETOCONAZOLE 20 MG/ML
SHAMPOO, SUSPENSION TOPICAL
Qty: 120 | Refills: 5 | Status: ERX

## 2023-12-27 RX ORDER — HYDROQUINONE
POWDER (GRAM) MISCELLANEOUS
Qty: 30 | Refills: 1 | Status: ERX | COMMUNITY
Start: 2023-12-27

## 2023-12-27 RX ORDER — MINOXIDIL
POWDER (GRAM) MISCELLANEOUS
Qty: 100 | Refills: 1 | Status: ERX

## 2023-12-27 RX ORDER — BETAMETHASONE DIPROPIONATE 0.5 MG/G
OINTMENT TOPICAL
Qty: 45 | Refills: 3 | Status: ERX

## 2023-12-27 RX ADMIN — Medication: at 00:00

## 2023-12-27 ASSESSMENT — LOCATION ZONE DERM
LOCATION ZONE: SCALP
LOCATION ZONE: FACE

## 2023-12-27 ASSESSMENT — LOCATION SIMPLE DESCRIPTION DERM
LOCATION SIMPLE: SCALP
LOCATION SIMPLE: POSTERIOR SCALP
LOCATION SIMPLE: RIGHT CHEEK
LOCATION SIMPLE: RIGHT SCALP
LOCATION SIMPLE: ANTERIOR SCALP
LOCATION SIMPLE: LEFT SCALP

## 2023-12-27 ASSESSMENT — LOCATION DETAILED DESCRIPTION DERM
LOCATION DETAILED: MID-FRONTAL SCALP
LOCATION DETAILED: POSTERIOR MID-PARIETAL SCALP
LOCATION DETAILED: LEFT SUPERIOR PARIETAL SCALP
LOCATION DETAILED: LEFT CENTRAL FRONTAL SCALP
LOCATION DETAILED: LEFT CENTRAL PARIETAL SCALP
LOCATION DETAILED: RIGHT CENTRAL FRONTAL SCALP
LOCATION DETAILED: LEFT MEDIAL FRONTAL SCALP
LOCATION DETAILED: RIGHT CENTRAL PARIETAL SCALP
LOCATION DETAILED: RIGHT SUPERIOR PARIETAL SCALP
LOCATION DETAILED: RIGHT SUPERIOR MEDIAL BUCCAL CHEEK

## 2023-12-27 NOTE — PROCEDURE: INTRALESIONAL KENALOG
Kenalog Type Of Vial: Multiple Dose
How Many Mls Were Removed From The 10 Mg/Ml (5ml) Vial When Preparing The Injectable Solution?: 0
Kenalog Preparation: Kenalog
Validate Note Data When Using Inventory: Yes
Detail Level: Simple
Require Ndc Code?: No
Total Volume (Ccs): 1.0
Medical Necessity Clause: This procedure was medically necessary because the lesions that were treated were:
Concentration Of Kenalog Solution Injected (Mg/Ml): 4.0
Administered By (Optional): Naz Quinones
Consent: The risks of atrophy were reviewed with the patient.

## 2023-12-27 NOTE — PROCEDURE: PRESCRIPTION MEDICATION MANAGEMENT
Detail Level: Zone
Continue Regimen: ketoconozole 2% shampoo to scalp bi weekly\\nbetamethasone dipropionate 0.05 % topical ointment Apply to the scalp Tiw for itch and flake
Render In Strict Bullet Format?: No
Initiate Treatment: hydroquinone (bulk) 98% powder Spot treat to dark areas on face Qhs only
Continue Regimen: minoxidil 8%, 0.05% Fluocinonide (bulk) in castor oil , apply to scalp qd

## 2024-01-10 ENCOUNTER — APPOINTMENT (RX ONLY)
Dept: URBAN - METROPOLITAN AREA CLINIC 1 | Facility: CLINIC | Age: 59
Setting detail: DERMATOLOGY
End: 2024-01-10

## 2024-01-10 PROCEDURE — ? REASON FOR LEAVING

## 2024-01-20 ENCOUNTER — APPOINTMENT (OUTPATIENT)
Facility: HOSPITAL | Age: 59
End: 2024-01-20
Payer: COMMERCIAL

## 2024-01-20 ENCOUNTER — HOSPITAL ENCOUNTER (EMERGENCY)
Facility: HOSPITAL | Age: 59
Discharge: HOME | End: 2024-01-20
Payer: COMMERCIAL

## 2024-01-20 VITALS
DIASTOLIC BLOOD PRESSURE: 87 MMHG | SYSTOLIC BLOOD PRESSURE: 132 MMHG | OXYGEN SATURATION: 98 % | TEMPERATURE: 98 F | HEART RATE: 79 BPM | HEIGHT: 59 IN | RESPIRATION RATE: 16 BRPM | BODY MASS INDEX: 36.29 KG/M2 | WEIGHT: 180 LBS

## 2024-01-20 DIAGNOSIS — S82.431A CLOSED DISPLACED OBLIQUE FRACTURE OF SHAFT OF RIGHT FIBULA, INITIAL ENCOUNTER: Primary | ICD-10-CM

## 2024-01-20 DIAGNOSIS — M25.471 ANKLE EFFUSION, RIGHT: ICD-10-CM

## 2024-01-20 DIAGNOSIS — R55 SYMPTOM OF SYNCOPE: ICD-10-CM

## 2024-01-20 LAB
EKG ATRIAL RATE: 78 BPM
EKG DIAGNOSIS: NORMAL
EKG P AXIS: 28 DEGREES
EKG P-R INTERVAL: 174 MS
EKG Q-T INTERVAL: 402 MS
EKG QRS DURATION: 86 MS
EKG QTC CALCULATION (BAZETT): 458 MS
EKG R AXIS: 70 DEGREES
EKG T AXIS: 30 DEGREES
EKG VENTRICULAR RATE: 78 BPM

## 2024-01-20 PROCEDURE — 73590 X-RAY EXAM OF LOWER LEG: CPT

## 2024-01-20 PROCEDURE — 6370000000 HC RX 637 (ALT 250 FOR IP): Performed by: NURSE PRACTITIONER

## 2024-01-20 PROCEDURE — 93005 ELECTROCARDIOGRAM TRACING: CPT | Performed by: NURSE PRACTITIONER

## 2024-01-20 PROCEDURE — 73630 X-RAY EXAM OF FOOT: CPT

## 2024-01-20 PROCEDURE — 99284 EMERGENCY DEPT VISIT MOD MDM: CPT

## 2024-01-20 PROCEDURE — 73610 X-RAY EXAM OF ANKLE: CPT

## 2024-01-20 RX ORDER — TRAMADOL HYDROCHLORIDE 50 MG/1
50 TABLET ORAL
Status: COMPLETED | OUTPATIENT
Start: 2024-01-20 | End: 2024-01-20

## 2024-01-20 RX ORDER — OXYCODONE HYDROCHLORIDE 5 MG/1
5 TABLET ORAL EVERY 6 HOURS PRN
Qty: 12 TABLET | Refills: 0 | Status: SHIPPED | OUTPATIENT
Start: 2024-01-20 | End: 2024-01-23

## 2024-01-20 RX ORDER — IBUPROFEN 200 MG
600 TABLET ORAL
Status: COMPLETED | OUTPATIENT
Start: 2024-01-20 | End: 2024-01-20

## 2024-01-20 RX ADMIN — TRAMADOL HYDROCHLORIDE 50 MG: 50 TABLET ORAL at 11:32

## 2024-01-20 RX ADMIN — IBUPROFEN 600 MG: 200 TABLET, FILM COATED ORAL at 11:32

## 2024-01-20 ASSESSMENT — LIFESTYLE VARIABLES
HOW MANY STANDARD DRINKS CONTAINING ALCOHOL DO YOU HAVE ON A TYPICAL DAY: 1 OR 2
HOW OFTEN DO YOU HAVE A DRINK CONTAINING ALCOHOL: MONTHLY OR LESS

## 2024-01-20 ASSESSMENT — PAIN DESCRIPTION - LOCATION
LOCATION: ANKLE
LOCATION: ANKLE;FOOT
LOCATION: ANKLE

## 2024-01-20 ASSESSMENT — PAIN - FUNCTIONAL ASSESSMENT
PAIN_FUNCTIONAL_ASSESSMENT: 0-10
PAIN_FUNCTIONAL_ASSESSMENT: 0-10

## 2024-01-20 ASSESSMENT — PAIN SCALES - GENERAL
PAINLEVEL_OUTOF10: 8
PAINLEVEL_OUTOF10: 10
PAINLEVEL_OUTOF10: 10

## 2024-01-20 ASSESSMENT — PAIN DESCRIPTION - ORIENTATION: ORIENTATION: RIGHT

## 2024-01-20 NOTE — ED PROVIDER NOTES
[facility] EMERGENCY DEPT  EMERGENCY DEPARTMENT HISTORY AND PHYSICAL EXAM      Date: 2024  Patient Name: Jessie Ruiz  MRN: [id number]  YOB: 1965  Date of evaluation: 2024  Provider: AMALIA Hernández NP   Note Started: 10:42 AM EST 24    HISTORY OF PRESENT ILLNESS     Chief Complaint   Patient presents with    Ankle Pain       History Provided By: Patient    HPI: Jessie Ruiz is a 58 y.o. female past medical significant for anemia, cancer under current chemo and other history reviewed as listed below presents complaining of right ankle and foot pain and swelling after a fall 4 days ago.  Patient reports that she believes she took 2 Seroquel that day instead of 1 accidentally and she began to feel \"woozy\" with her legs shaky and when she got up to have a bowel movement she states she \"blacked out\" and fell witnessed by her friend with no head injury and she did not make it to the bathroom in time and had a bowel movement on herself but denies any history of seizures and states that she was back to her baseline very shortly after and has had no more incidents of syncope or blacking out.  She denies any history of that as well.  Started having pain in her ankle and the bruising and swelling has worsened prompting her visit today.  She denies pain or injury anywhere else.    PAST MEDICAL HISTORY   Past Medical History:  Past Medical History:   Diagnosis Date    Anemia     Cancer (HCC)     sigmoid colon - per Dr. Hayden's order    Colon cancer (HCC)     Depression     GERD (gastroesophageal reflux disease)     Liver cancer (HCC)     Liver disease     Liver spots        Past Surgical History:  Past Surgical History:   Procedure Laterality Date     SECTION      2    COLONOSCOPY  2021        IR FLUORO GUIDED NEEDLE PLACEMENT  2/10/2022    IR PORT PLACEMENT EQUAL OR GREATER THAN 5 YEARS  2/10/2022    IR PORT PLACEMENT EQUAL OR GREATER THAN 5 YEARS 2/10/2022 [facility] ANGIO  displacement of approximately 3 mm of the distal fragment with ankle effusion and borderline ankle mortise widening.  Consulted with  orthopedics and provided patient's HPI, imaging results and he advised to have patient placed in walking boot, weightbearing as tolerated, and follow-up in office.  Discussed with patient and she also will be provided with some crutches for assistance.  Pain medication sent to pharmacy.  Patient was given strict return precautions and signs and symptoms to watch for in terms of circulatory compromise or abnormalities and to return immediately which she demonstrated understanding and comfort with discharge plan. [KR]      ED Course User Index  [KR] Nava Porter, APRN - NP       Sepsis Reassessment: Sepsis reassessment not applicable    Disposition Considerations (Tests not done, Shared Decision Making, Pt Expectation of Test or Treatment.): See ED Course    Patient was given the following medications:  Medications   traMADol (ULTRAM) tablet 50 mg (50 mg Oral Given 1/20/24 1132)   ibuprofen (ADVIL;MOTRIN) tablet 600 mg (600 mg Oral Given 1/20/24 1132)       CONSULTS: (Who and What was discussed)  None     Social Determinants affecting Dx or Tx: None    Smoking Cessation: Not Applicable    PROCEDURES   Unless otherwise noted above, none.  Procedures      CRITICAL CARE TIME   Patient does not meet Critical Care Time, 0 minutes    FINAL IMPRESSION     1. Closed displaced oblique fracture of shaft of right fibula, initial encounter    2. Ankle effusion, right    3. Symptom of syncope          DISPOSITION/PLAN   DISPOSITION Discharge - Pending Orders Complete 01/20/2024 11:44:25 AM    Discharge Note: The patient is stable for discharge home. The signs, symptoms, diagnosis, and discharge instructions have been discussed, understanding conveyed, and agreed upon. The patient is to follow up as recommended or return to ER should their symptoms worsen.      PATIENT REFERRED

## 2024-01-24 NOTE — PROGRESS NOTES
or  soft tissue abnormality. A large bunion is present. Small Achilles and plantar  spurs are seen.    Impression  Acute right distal fibula fracture with borderline ankle mortise and joint  effusion  No fracture in the proximal right tibia and fibula or right foot  Large bunion      XR FOOT RIGHT (MIN 3 VIEWS) 01/20/2024    Narrative  EXAM: XR ANKLE RIGHT (MIN 3 VIEWS), XR TIBIA FIBULA RIGHT (2 VIEWS), XR FOOT  RIGHT (MIN 3 VIEWS)    INDICATION: Pain, swelling, and bruising after fall 4 days ago..    COMPARISON: None.    FINDINGS:    Two views of the right tibia and fibula and straight no fracture or dislocation  of the right proximal tibia or fibula.    Three views of the right ankle demonstrate an oblique fracture through the  distal fibula with lateral displacement of the distal fragment by about 3 mm.  The ankle mortise is widened. There is ankle effusion as well as lateral soft  tissue swelling.    3 views of the right foot demonstrate no fracture or other osseous, articular or  soft tissue abnormality. A large bunion is present. Small Achilles and plantar  spurs are seen.    Impression  Acute right distal fibula fracture with borderline ankle mortise and joint  effusion  No fracture in the proximal right tibia and fibula or right foot  Large bunion       No orders of the defined types were placed in this encounter.           An electronic signature was used to authenticate this note.  -- Sultana Pelletier MD

## 2024-01-29 ENCOUNTER — OFFICE VISIT (OUTPATIENT)
Age: 59
End: 2024-01-29
Payer: COMMERCIAL

## 2024-01-29 VITALS — WEIGHT: 180 LBS | HEIGHT: 59 IN | BODY MASS INDEX: 36.29 KG/M2

## 2024-01-29 DIAGNOSIS — S82.831A CLOSED FRACTURE OF DISTAL END OF RIGHT FIBULA, UNSPECIFIED FRACTURE MORPHOLOGY, INITIAL ENCOUNTER: Primary | ICD-10-CM

## 2024-01-29 PROCEDURE — 99203 OFFICE O/P NEW LOW 30 MIN: CPT | Performed by: ORTHOPAEDIC SURGERY

## 2024-01-29 RX ORDER — OXYCODONE HYDROCHLORIDE 5 MG/1
5 TABLET ORAL EVERY 8 HOURS PRN
Qty: 21 TABLET | Refills: 0 | Status: SHIPPED | OUTPATIENT
Start: 2024-01-29 | End: 2024-02-05

## 2024-01-29 ASSESSMENT — PATIENT HEALTH QUESTIONNAIRE - PHQ9
1. LITTLE INTEREST OR PLEASURE IN DOING THINGS: 0
SUM OF ALL RESPONSES TO PHQ QUESTIONS 1-9: 0
2. FEELING DOWN, DEPRESSED OR HOPELESS: 0
SUM OF ALL RESPONSES TO PHQ QUESTIONS 1-9: 0
SUM OF ALL RESPONSES TO PHQ9 QUESTIONS 1 & 2: 0

## 2024-02-08 ENCOUNTER — HOSPITAL ENCOUNTER (OUTPATIENT)
Facility: HOSPITAL | Age: 59
Discharge: HOME | End: 2024-02-08
Payer: COMMERCIAL

## 2024-02-08 DIAGNOSIS — S82.831A CLOSED FRACTURE OF DISTAL END OF RIGHT FIBULA, UNSPECIFIED FRACTURE MORPHOLOGY, INITIAL ENCOUNTER: ICD-10-CM

## 2024-02-08 PROCEDURE — 73610 X-RAY EXAM OF ANKLE: CPT

## 2024-02-14 ENCOUNTER — APPOINTMENT (RX ONLY)
Dept: URBAN - METROPOLITAN AREA CLINIC 1 | Facility: CLINIC | Age: 59
Setting detail: DERMATOLOGY
End: 2024-02-14

## 2024-02-14 DIAGNOSIS — L65.9 NONSCARRING HAIR LOSS, UNSPECIFIED: ICD-10-CM | Status: INADEQUATELY CONTROLLED

## 2024-02-14 DIAGNOSIS — L21.8 OTHER SEBORRHEIC DERMATITIS: ICD-10-CM | Status: STABLE

## 2024-02-14 PROCEDURE — ? COUNSELING

## 2024-02-14 PROCEDURE — 11901 INJECT SKIN LESIONS >7: CPT

## 2024-02-14 PROCEDURE — ? PRESCRIPTION MEDICATION MANAGEMENT

## 2024-02-14 PROCEDURE — ? INTRALESIONAL KENALOG

## 2024-02-14 PROCEDURE — 99213 OFFICE O/P EST LOW 20 MIN: CPT | Mod: 25

## 2024-02-14 PROCEDURE — ? PRESCRIPTION

## 2024-02-14 RX ORDER — KETOCONAZOLE 20 MG/ML
SHAMPOO, SUSPENSION TOPICAL
Qty: 120 | Refills: 5 | Status: ERX

## 2024-02-14 ASSESSMENT — LOCATION SIMPLE DESCRIPTION DERM
LOCATION SIMPLE: LEFT SCALP
LOCATION SIMPLE: SCALP
LOCATION SIMPLE: SUPERIOR FOREHEAD
LOCATION SIMPLE: RIGHT FOREHEAD
LOCATION SIMPLE: ANTERIOR SCALP
LOCATION SIMPLE: POSTERIOR SCALP
LOCATION SIMPLE: RIGHT SCALP

## 2024-02-14 ASSESSMENT — LOCATION DETAILED DESCRIPTION DERM
LOCATION DETAILED: RIGHT SUPERIOR FOREHEAD
LOCATION DETAILED: LEFT MEDIAL FRONTAL SCALP
LOCATION DETAILED: RIGHT MEDIAL FRONTAL SCALP
LOCATION DETAILED: RIGHT SUPERIOR PARIETAL SCALP
LOCATION DETAILED: MID-FRONTAL SCALP
LOCATION DETAILED: RIGHT CENTRAL FRONTAL SCALP
LOCATION DETAILED: LEFT CENTRAL PARIETAL SCALP
LOCATION DETAILED: POSTERIOR MID-PARIETAL SCALP
LOCATION DETAILED: SUPERIOR MID FOREHEAD
LOCATION DETAILED: LEFT SUPERIOR PARIETAL SCALP
LOCATION DETAILED: LEFT CENTRAL FRONTAL SCALP
LOCATION DETAILED: RIGHT CENTRAL PARIETAL SCALP

## 2024-02-14 ASSESSMENT — LOCATION ZONE DERM
LOCATION ZONE: FACE
LOCATION ZONE: SCALP

## 2024-02-14 NOTE — PROCEDURE: PRESCRIPTION MEDICATION MANAGEMENT
Detail Level: Zone
Render In Strict Bullet Format?: No
Continue Regimen: minoxidil 8%, 0.05% Fluocinonide (bulk) in castor oil , apply to scalp qd
Continue Regimen: betamethasone dipropionate 0.05 % topical ointment: Apply to the scalp once weekly for itch and flake.\\n\\nketoconozole shampoo to scalp weekly

## 2024-03-05 ENCOUNTER — TELEPHONE (OUTPATIENT)
Age: 59
End: 2024-03-05

## 2024-03-05 DIAGNOSIS — S82.831A CLOSED FRACTURE OF DISTAL END OF RIGHT FIBULA, UNSPECIFIED FRACTURE MORPHOLOGY, INITIAL ENCOUNTER: Primary | ICD-10-CM

## 2024-03-26 ENCOUNTER — HOSPITAL ENCOUNTER (OUTPATIENT)
Facility: HOSPITAL | Age: 59
Discharge: HOME | End: 2024-03-29
Payer: COMMERCIAL

## 2024-03-26 DIAGNOSIS — S82.831A CLOSED FRACTURE OF DISTAL END OF RIGHT FIBULA, UNSPECIFIED FRACTURE MORPHOLOGY, INITIAL ENCOUNTER: ICD-10-CM

## 2024-03-26 PROCEDURE — 73610 X-RAY EXAM OF ANKLE: CPT

## 2024-03-27 ENCOUNTER — OFFICE VISIT (OUTPATIENT)
Age: 59
End: 2024-03-27

## 2024-03-27 ENCOUNTER — APPOINTMENT (RX ONLY)
Dept: URBAN - METROPOLITAN AREA CLINIC 1 | Facility: CLINIC | Age: 59
Setting detail: DERMATOLOGY
End: 2024-03-27

## 2024-03-27 VITALS
HEIGHT: 59 IN | BODY MASS INDEX: 36.29 KG/M2 | TEMPERATURE: 97.5 F | RESPIRATION RATE: 18 BRPM | HEART RATE: 87 BPM | WEIGHT: 180 LBS | OXYGEN SATURATION: 95 % | DIASTOLIC BLOOD PRESSURE: 73 MMHG | SYSTOLIC BLOOD PRESSURE: 105 MMHG

## 2024-03-27 DIAGNOSIS — L20.89 OTHER ATOPIC DERMATITIS: ICD-10-CM | Status: INADEQUATELY CONTROLLED

## 2024-03-27 DIAGNOSIS — S82.831A CLOSED FRACTURE OF DISTAL END OF RIGHT FIBULA, UNSPECIFIED FRACTURE MORPHOLOGY, INITIAL ENCOUNTER: Primary | ICD-10-CM

## 2024-03-27 DIAGNOSIS — L65.9 NONSCARRING HAIR LOSS, UNSPECIFIED: ICD-10-CM | Status: INADEQUATELY CONTROLLED

## 2024-03-27 DIAGNOSIS — L21.8 OTHER SEBORRHEIC DERMATITIS: ICD-10-CM | Status: INADEQUATELY CONTROLLED

## 2024-03-27 PROCEDURE — ? INTRALESIONAL KENALOG

## 2024-03-27 PROCEDURE — 99214 OFFICE O/P EST MOD 30 MIN: CPT | Mod: 25

## 2024-03-27 PROCEDURE — ? PRESCRIPTION MEDICATION MANAGEMENT

## 2024-03-27 PROCEDURE — 11901 INJECT SKIN LESIONS >7: CPT

## 2024-03-27 PROCEDURE — ? PRESCRIPTION

## 2024-03-27 PROCEDURE — ? COUNSELING

## 2024-03-27 PROCEDURE — ? MEDICATION COUNSELING

## 2024-03-27 PROCEDURE — ? PHOTO-DOCUMENTATION

## 2024-03-27 PROCEDURE — 99024 POSTOP FOLLOW-UP VISIT: CPT | Performed by: ORTHOPAEDIC SURGERY

## 2024-03-27 RX ORDER — CLOBETASOL PROPIONATE 0.5 MG/G
CREAM TOPICAL
Qty: 45 | Refills: 2 | Status: ERX | COMMUNITY
Start: 2024-03-27

## 2024-03-27 RX ORDER — KETOCONAZOLE 20 MG/ML
SHAMPOO, SUSPENSION TOPICAL
Qty: 120 | Refills: 3 | Status: ERX

## 2024-03-27 RX ADMIN — CLOBETASOL PROPIONATE: 0.5 CREAM TOPICAL at 00:00

## 2024-03-27 ASSESSMENT — LOCATION DETAILED DESCRIPTION DERM
LOCATION DETAILED: MID-FRONTAL SCALP
LOCATION DETAILED: RIGHT SUPERIOR FOREHEAD
LOCATION DETAILED: RIGHT CENTRAL FRONTAL SCALP
LOCATION DETAILED: POSTERIOR MID-PARIETAL SCALP
LOCATION DETAILED: RIGHT SUPERIOR PARIETAL SCALP
LOCATION DETAILED: LEFT CENTRAL FRONTAL SCALP
LOCATION DETAILED: LEFT MEDIAL FRONTAL SCALP
LOCATION DETAILED: RIGHT CENTRAL PARIETAL SCALP
LOCATION DETAILED: LEFT CENTRAL PARIETAL SCALP
LOCATION DETAILED: SUPERIOR MID FOREHEAD
LOCATION DETAILED: LEFT RADIAL DORSAL HAND
LOCATION DETAILED: LEFT SUPERIOR PARIETAL SCALP
LOCATION DETAILED: RIGHT MEDIAL FRONTAL SCALP

## 2024-03-27 ASSESSMENT — PATIENT HEALTH QUESTIONNAIRE - PHQ9
SUM OF ALL RESPONSES TO PHQ QUESTIONS 1-9: 0
2. FEELING DOWN, DEPRESSED OR HOPELESS: NOT AT ALL
SUM OF ALL RESPONSES TO PHQ9 QUESTIONS 1 & 2: 0
1. LITTLE INTEREST OR PLEASURE IN DOING THINGS: NOT AT ALL
SUM OF ALL RESPONSES TO PHQ QUESTIONS 1-9: 0

## 2024-03-27 ASSESSMENT — LOCATION ZONE DERM
LOCATION ZONE: HAND
LOCATION ZONE: SCALP
LOCATION ZONE: FACE

## 2024-03-27 ASSESSMENT — LOCATION SIMPLE DESCRIPTION DERM
LOCATION SIMPLE: SUPERIOR FOREHEAD
LOCATION SIMPLE: LEFT SCALP
LOCATION SIMPLE: SCALP
LOCATION SIMPLE: LEFT HAND
LOCATION SIMPLE: RIGHT SCALP
LOCATION SIMPLE: POSTERIOR SCALP
LOCATION SIMPLE: ANTERIOR SCALP
LOCATION SIMPLE: RIGHT FOREHEAD

## 2024-03-27 NOTE — PROCEDURE: PRESCRIPTION MEDICATION MANAGEMENT
Plan: S/P 5 months Minoxidil 8% compound
Detail Level: Zone
Render In Strict Bullet Format?: No
Continue Regimen: minoxidil 8%, 0.05% Fluocinonide (bulk) in castor oil , apply to scalp qd
Continue Regimen: betamethasone dipropionate 0.05 % topical ointment: Apply to the scalp once weekly for itch and flake.\\n\\nketoconozole shampoo to scalp weekly
Continue Regimen: Apply to the eczema flares on the body BID x 2 weeks prn flares.

## 2024-03-27 NOTE — PROCEDURE: MEDICATION COUNSELING
Odomzo Counseling- I discussed with the patient the risks of Odomzo including but not limited to nausea, vomiting, diarrhea, constipation, weight loss, changes in the sense of taste, decreased appetite, muscle spasms, and hair loss.  The patient verbalized understanding of the proper use and possible adverse effects of Odomzo.  All of the patient's questions and concerns were addressed.
Itraconazole Counseling:  I discussed with the patient the risks of itraconazole including but not limited to liver damage, nausea/vomiting, neuropathy, and severe allergy.  The patient understands that this medication is best absorbed when taken with acidic beverages such as non-diet cola or ginger ale.  The patient understands that monitoring is required including baseline LFTs and repeat LFTs at intervals.  The patient understands that they are to contact us or the primary physician if concerning signs are noted.
Tranexamic Acid Counseling:  Patient advised of the small risk of bleeding problems with tranexamic acid. They were also instructed to call if they developed any nausea, vomiting or diarrhea. All of the patient's questions and concerns were addressed.
Bimzelx Counseling:  I discussed with the patient the risks of Bimzelx including but not limited to depression, immunosuppression, allergic reactions and infections.  The patient understands that monitoring is required including a PPD at baseline and must alert us or the primary physician if symptoms of infection or other concerning signs are noted.
Hydroxyzine Counseling: Patient advised that the medication is sedating and not to drive a car after taking this medication.  Patient informed of potential adverse effects including but not limited to dry mouth, urinary retention, and blurry vision.  The patient verbalized understanding of the proper use and possible adverse effects of hydroxyzine.  All of the patient's questions and concerns were addressed.
Sotyktu Counseling:  I discussed the most common side effects of Sotyktu including: common cold, sore throat, sinus infections, cold sores, canker sores, folliculitis, and acne.  I also discussed more serious side effects of Sotyktu including but not limited to: serious allergic reactions; increased risk for infections such as TB; cancers such as lymphomas; rhabdomyolysis and elevated CPK; and elevated triglycerides and liver enzymes. 
Clofazimine Counseling:  I discussed with the patient the risks of clofazimine including but not limited to skin and eye pigmentation, liver damage, nausea/vomiting, gastrointestinal bleeding and allergy.
Drysol Pregnancy And Lactation Text: This medication is considered safe during pregnancy and breast feeding.
Erythromycin Pregnancy And Lactation Text: This medication is Pregnancy Category B and is considered safe during pregnancy. It is also excreted in breast milk.
Nsaids Pregnancy And Lactation Text: These medications are considered safe up to 30 weeks gestation. It is excreted in breast milk.
Hydroxyzine Pregnancy And Lactation Text: This medication is not safe during pregnancy and should not be taken. It is also excreted in breast milk and breast feeding isn't recommended.
Birth Control Pills Pregnancy And Lactation Text: This medication should be avoided if pregnant and for the first 30 days post-partum.
Olanzapine Counseling- I discussed with the patient the common side effects of olanzapine including but are not limited to: lack of energy, dry mouth, increased appetite, sleepiness, tremor, constipation, dizziness, changes in behavior, or restlessness.  Explained that teenagers are more likely to experience headaches, abdominal pain, pain in the arms or legs, tiredness, and sleepiness.  Serious side effects include but are not limited: increased risk of death in elderly patients who are confused, have memory loss, or dementia-related psychosis; hyperglycemia; increased cholesterol and triglycerides; and weight gain.
Elidel Counseling: Patient may experience a mild burning sensation during topical application. Elidel is not approved in children less than 2 years of age. There have been case reports of hematologic and skin malignancies in patients using topical calcineurin inhibitors although causality is questionable.
Siliq Pregnancy And Lactation Text: The risk during pregnancy and breastfeeding is uncertain with this medication.
Humira Counseling:  I discussed with the patient the risks of adalimumab including but not limited to myelosuppression, immunosuppression, autoimmune hepatitis, demyelinating diseases, lymphoma, and serious infections.  The patient understands that monitoring is required including a PPD at baseline and must alert us or the primary physician if symptoms of infection or other concerning signs are noted.
Tremfya Counseling: I discussed with the patient the risks of guselkumab including but not limited to immunosuppression, serious infections, worsening of inflammatory bowel disease and drug reactions.  The patient understands that monitoring is required including a PPD at baseline and must alert us or the primary physician if symptoms of infection or other concerning signs are noted.
Zoryve Pregnancy And Lactation Text: It is unknown if this medication can cause problems during pregnancy and breastfeeding.
Bimzelx Pregnancy And Lactation Text: This medication crosses the placenta and the safety is uncertain during pregnancy. It is unknown if this medication is present in breast milk.
Isotretinoin Pregnancy And Lactation Text: This medication is Pregnancy Category X and is considered extremely dangerous during pregnancy. It is unknown if it is excreted in breast milk.
Topical Sulfur Applications Counseling: Topical Sulfur Counseling: Patient counseled that this medication may cause skin irritation or allergic reactions.  In the event of skin irritation, the patient was advised to reduce the amount of the drug applied or use it less frequently.   The patient verbalized understanding of the proper use and possible adverse effects of topical sulfur application.  All of the patient's questions and concerns were addressed.
Minoxidil Pregnancy And Lactation Text: This medication has not been assigned a Pregnancy Risk Category but animal studies failed to show danger with the topical medication. It is unknown if the medication is excreted in breast milk.
Tazorac Pregnancy And Lactation Text: This medication is not safe during pregnancy. It is unknown if this medication is excreted in breast milk.
Glycopyrrolate Pregnancy And Lactation Text: This medication is Pregnancy Category B and is considered safe during pregnancy. It is unknown if it is excreted breast milk.
Rhofade Counseling: Rhofade is a topical medication which can decrease superficial blood flow where applied. Side effects are uncommon and include stinging, redness and allergic reactions.
Cellcept Pregnancy And Lactation Text: This medication is Pregnancy Category D and isn't considered safe during pregnancy. It is unknown if this medication is excreted in breast milk.
Benzoyl Peroxide Pregnancy And Lactation Text: This medication is Pregnancy Category C. It is unknown if benzoyl peroxide is excreted in breast milk.
Bactrim Pregnancy And Lactation Text: This medication is Pregnancy Category D and is known to cause fetal risk.  It is also excreted in breast milk.
Sarecycline Counseling: Patient advised regarding possible photosensitivity and discoloration of the teeth, skin, lips, tongue and gums.  Patient instructed to avoid sunlight, if possible.  When exposed to sunlight, patients should wear protective clothing, sunglasses, and sunscreen.  The patient was instructed to call the office immediately if the following severe adverse effects occur:  hearing changes, easy bruising/bleeding, severe headache, or vision changes.  The patient verbalized understanding of the proper use and possible adverse effects of sarecycline.  All of the patient's questions and concerns were addressed.
Rhofade Pregnancy And Lactation Text: This medication has not been assigned a Pregnancy Risk Category. It is unknown if the medication is excreted in breast milk.
Odomzo Pregnancy And Lactation Text: This medication is Pregnancy Category X and is absolutely contraindicated during pregnancy. It is unknown if it is excreted in breast milk.
Topical Sulfur Applications Pregnancy And Lactation Text: This medication is Pregnancy Category C and has an unknown safety profile during pregnancy. It is unknown if this topical medication is excreted in breast milk.
Topical Clindamycin Counseling: Patient counseled that this medication may cause skin irritation or allergic reactions.  In the event of skin irritation, the patient was advised to reduce the amount of the drug applied or use it less frequently.   The patient verbalized understanding of the proper use and possible adverse effects of clindamycin.  All of the patient's questions and concerns were addressed.
Metronidazole Counseling:  I discussed with the patient the risks of metronidazole including but not limited to seizures, nausea/vomiting, a metallic taste in the mouth, nausea/vomiting and severe allergy.
Sotyktu Pregnancy And Lactation Text: There is insufficient data to evaluate whether or not Sotyktu is safe to use during pregnancy.   It is not known if Sotyktu passes into breast milk and whether or not it is safe to use when breastfeeding.  
Cibinqo Counseling: I discussed with the patient the risks of Cibinqo therapy including but not limited to common cold, nausea, headache, cold sores, increased blood CPK levels, dizziness, UTIs, fatigue, acne, and vomitting. Live vaccines should be avoided.  This medication has been linked to serious infections; higher rate of mortality; malignancy and lymphoproliferative disorders; major adverse cardiovascular events; thrombosis; thrombocytopenia and lymphopenia; lipid elevations; and retinal detachment.
Tranexamic Acid Pregnancy And Lactation Text: It is unknown if this medication is safe during pregnancy or breast feeding.
Itraconazole Pregnancy And Lactation Text: This medication is Pregnancy Category C and it isn't know if it is safe during pregnancy. It is also excreted in breast milk.
Clofazimine Pregnancy And Lactation Text: This medication is Pregnancy Category C and isn't considered safe during pregnancy. It is excreted in breast milk.
Colchicine Counseling:  Patient counseled regarding adverse effects including but not limited to stomach upset (nausea, vomiting, stomach pain, or diarrhea).  Patient instructed to limit alcohol consumption while taking this medication.  Colchicine may reduce blood counts especially with prolonged use.  The patient understands that monitoring of kidney function and blood counts may be required, especially at baseline. The patient verbalized understanding of the proper use and possible adverse effects of colchicine.  All of the patient's questions and concerns were addressed.
Olanzapine Pregnancy And Lactation Text: This medication is pregnancy category C.   There are no adequate and well controlled trials with olanzapine in pregnant females.  Olanzapine should be used during pregnancy only if the potential benefit justifies the potential risk to the fetus.   In a study in lactating healthy women, olanzapine was excreted in breast milk.  It is recommended that women taking olanzapine should not breast feed.
Metronidazole Pregnancy And Lactation Text: This medication is Pregnancy Category B and considered safe during pregnancy.  It is also excreted in breast milk.
Humira Pregnancy And Lactation Text: This medication is Pregnancy Category B and is considered safe during pregnancy. It is unknown if this medication is excreted in breast milk.
Oxybutynin Pregnancy And Lactation Text: This medication is Pregnancy Category B and is considered safe during pregnancy. It is unknown if it is excreted in breast milk.
Simponi Counseling:  I discussed with the patient the risks of golimumab including but not limited to myelosuppression, immunosuppression, autoimmune hepatitis, demyelinating diseases, lymphoma, and serious infections.  The patient understands that monitoring is required including a PPD at baseline and must alert us or the primary physician if symptoms of infection or other concerning signs are noted.
Elidel Pregnancy And Lactation Text: This medication is Pregnancy Category C. It is unknown if this medication is excreted in breast milk.
High Dose Vitamin A Counseling: Side effects reviewed, pt to contact office should one occur.
Zyclara Counseling:  I discussed with the patient the risks of imiquimod including but not limited to erythema, scaling, itching, weeping, crusting, and pain.  Patient understands that the inflammatory response to imiquimod is variable from person to person and was educated regarded proper titration schedule.  If flu-like symptoms develop, patient knows to discontinue the medication and contact us.
Hydroxychloroquine Counseling:  I discussed with the patient that a baseline ophthalmologic exam is needed at the start of therapy and every year thereafter while on therapy. A CBC may also be warranted for monitoring.  The side effects of this medication were discussed with the patient, including but not limited to agranulocytosis, aplastic anemia, seizures, rashes, retinopathy, and liver toxicity. Patient instructed to call the office should any adverse effect occur.  The patient verbalized understanding of the proper use and possible adverse effects of Plaquenil.  All the patient's questions and concerns were addressed.
Cephalexin Counseling: I counseled the patient regarding use of cephalexin as an antibiotic for prophylactic and/or therapeutic purposes. Cephalexin (commonly prescribed under brand name Keflex) is a cephalosporin antibiotic which is active against numerous classes of bacteria, including most skin bacteria. Side effects may include nausea, diarrhea, gastrointestinal upset, rash, hives, yeast infections, and in rare cases, hepatitis, kidney disease, seizures, fever, confusion, neurologic symptoms, and others. Patients with severe allergies to penicillin medications are cautioned that there is about a 10% incidence of cross-reactivity with cephalosporins. When possible, patients with penicillin allergies should use alternatives to cephalosporins for antibiotic therapy.
Mirvaso Counseling: Mirvaso is a topical medication which can decrease superficial blood flow where applied. Side effects are uncommon and include stinging, redness and allergic reactions.
Carac Counseling:  I discussed with the patient the risks of Carac including but not limited to erythema, scaling, itching, weeping, crusting, and pain.
Spironolactone Counseling: Patient advised regarding risks of diarrhea, abdominal pain, hyperkalemia, birth defects (for female patients), liver toxicity and renal toxicity. The patient may need blood work to monitor liver and kidney function and potassium levels while on therapy. The patient verbalized understanding of the proper use and possible adverse effects of spironolactone.  All of the patient's questions and concerns were addressed.
Cimzia Counseling:  I discussed with the patient the risks of Cimzia including but not limited to immunosuppression, allergic reactions and infections.  The patient understands that monitoring is required including a PPD at baseline and must alert us or the primary physician if symptoms of infection or other concerning signs are noted.
Cyclophosphamide Counseling:  I discussed with the patient the risks of cyclophosphamide including but not limited to hair loss, hormonal abnormalities, decreased fertility, abdominal pain, diarrhea, nausea and vomiting, bone marrow suppression and infection. The patient understands that monitoring is required while taking this medication.
Sarecycline Pregnancy And Lactation Text: This medication is Pregnancy Category D and not consider safe during pregnancy. It is also excreted in breast milk.
Solaraze Counseling:  I discussed with the patient the risks of Solaraze including but not limited to erythema, scaling, itching, weeping, crusting, and pain.
Cyclophosphamide Pregnancy And Lactation Text: This medication is Pregnancy Category D and it isn't considered safe during pregnancy. This medication is excreted in breast milk.
Tetracycline Counseling: Patient counseled regarding possible photosensitivity and increased risk for sunburn.  Patient instructed to avoid sunlight, if possible.  When exposed to sunlight, patients should wear protective clothing, sunglasses, and sunscreen.  The patient was instructed to call the office immediately if the following severe adverse effects occur:  hearing changes, easy bruising/bleeding, severe headache, or vision changes.  The patient verbalized understanding of the proper use and possible adverse effects of tetracycline.  All of the patient's questions and concerns were addressed. Patient understands to avoid pregnancy while on therapy due to potential birth defects.
Wartpeel Counseling:  I discussed with the patient the risks of Wartpeel including but not limited to erythema, scaling, itching, weeping, crusting, and pain.
Detail Level: Simple
Albendazole Counseling:  I discussed with the patient the risks of albendazole including but not limited to cytopenia, kidney damage, nausea/vomiting and severe allergy.  The patient understands that this medication is being used in an off-label manner.
Ketoconazole Counseling:   Patient counseled regarding improving absorption with orange juice.  Adverse effects include but are not limited to breast enlargement, headache, diarrhea, nausea, upset stomach, liver function test abnormalities, taste disturbance, and stomach pain.  There is a rare possibility of liver failure that can occur when taking ketoconazole. The patient understands that monitoring of LFTs may be required, especially at baseline. The patient verbalized understanding of the proper use and possible adverse effects of ketoconazole.  All of the patient's questions and concerns were addressed.
Valtrex Counseling: I discussed with the patient the risks of valacyclovir including but not limited to kidney damage, nausea, vomiting and severe allergy.  The patient understands that if the infection seems to be worsening or is not improving, they are to call.
Xeljanz Counseling: I discussed with the patient the risks of Xeljanz therapy including increased risk of infection, liver issues, headache, diarrhea, or cold symptoms. Live vaccines should be avoided. They were instructed to call if they have any problems.
Cibinqo Pregnancy And Lactation Text: It is unknown if this medication will adversely affect pregnancy or breast feeding.  You should not take this medication if you are currently pregnant or planning a pregnancy or while breastfeeding.
Xelfideliaz Pregnancy And Lactation Text: This medication is Pregnancy Category D and is not considered safe during pregnancy.  The risk during breast feeding is also uncertain.
Minocycline Counseling: Patient advised regarding possible photosensitivity and discoloration of the teeth, skin, lips, tongue and gums.  Patient instructed to avoid sunlight, if possible.  When exposed to sunlight, patients should wear protective clothing, sunglasses, and sunscreen.  The patient was instructed to call the office immediately if the following severe adverse effects occur:  hearing changes, easy bruising/bleeding, severe headache, or vision changes.  The patient verbalized understanding of the proper use and possible adverse effects of minocycline.  All of the patient's questions and concerns were addressed.
Oral Minoxidil Counseling- I discussed with the patient the risks of oral minoxidil including but not limited to shortness of breath, swelling of the feet or ankles, dizziness, lightheadedness, unwanted hair growth and allergic reaction.  The patient verbalized understanding of the proper use and possible adverse effects of oral minoxidil.  All of the patient's questions and concerns were addressed.
Litfulo Counseling: I discussed with the patient the risks of Litfulo therapy including but not limited to upper respiratory tract infections, shingles, cold sores, and nausea. Live vaccines should be avoided.  This medication has been linked to serious infections; higher rate of mortality; malignancy and lymphoproliferative disorders; major adverse cardiovascular events; thrombosis; gastrointestinal perforations; neutropenia; lymphopenia; anemia; liver enzyme elevations; and lipid elevations.
Eucrisa Counseling: Patient may experience a mild burning sensation during topical application. Eucrisa is not approved in children less than 2 years of age.
Propranolol Counseling:  I discussed with the patient the risks of propranolol including but not limited to low heart rate, low blood pressure, low blood sugar, restlessness and increased cold sensitivity. They should call the office if they experience any of these side effects.
Ilumya Counseling: I discussed with the patient the risks of tildrakizumab including but not limited to immunosuppression, malignancy, posterior leukoencephalopathy syndrome, and serious infections.  The patient understands that monitoring is required including a PPD at baseline and must alert us or the primary physician if symptoms of infection or other concerning signs are noted.
Xolair Counseling:  Patient informed of potential adverse effects including but not limited to fever, muscle aches, rash and allergic reactions.  The patient verbalized understanding of the proper use and possible adverse effects of Xolair.  All of the patient's questions and concerns were addressed.
Dutasteride Male Counseling: Dutasteride Counseling:  I discussed with the patient the risks of use of dutasteride including but not limited to decreased libido, decreased ejaculate volume, and gynecomastia. Women who can become pregnant should not handle medication.  All of the patient's questions and concerns were addressed.
Cephalexin Pregnancy And Lactation Text: This medication is Pregnancy Category B and considered safe during pregnancy.  It is also excreted in breast milk but can be used safely for shorter doses.
Cimzia Pregnancy And Lactation Text: This medication crosses the placenta but can be considered safe in certain situations. Cimzia may be excreted in breast milk.
Carac Pregnancy And Lactation Text: This medication is Pregnancy Category X and contraindicated in pregnancy and in women who may become pregnant. It is unknown if this medication is excreted in breast milk.
Spironolactone Pregnancy And Lactation Text: This medication can cause feminization of the male fetus and should be avoided during pregnancy. The active metabolite is also found in breast milk.
High Dose Vitamin A Pregnancy And Lactation Text: High dose vitamin A therapy is contraindicated during pregnancy and breast feeding.
Hydroxychloroquine Pregnancy And Lactation Text: This medication has been shown to cause fetal harm but it isn't assigned a Pregnancy Risk Category. There are small amounts excreted in breast milk.
Cyclosporine Counseling:  I discussed with the patient the risks of cyclosporine including but not limited to hypertension, gingival hyperplasia,myelosuppression, immunosuppression, liver damage, kidney damage, neurotoxicity, lymphoma, and serious infections. The patient understands that monitoring is required including baseline blood pressure, CBC, CMP, lipid panel and uric acid, and then 1-2 times monthly CMP and blood pressure.
Skyrizi Counseling: I discussed with the patient the risks of risankizumab-rzaa including but not limited to immunosuppression, and serious infections.  The patient understands that monitoring is required including a PPD at baseline and must alert us or the primary physician if symptoms of infection or other concerning signs are noted.
Xolair Pregnancy And Lactation Text: This medication is Pregnancy Category B and is considered safe during pregnancy. This medication is excreted in breast milk.
Include Pregnancy/Lactation Warning?: No
Opzelura Counseling:  I discussed with the patient the risks of Opzelura including but not limited to nasopharngitis, bronchitis, ear infection, eosinophila, hives, diarrhea, folliculitis, tonsillitis, and rhinorrhea.  Taken orally, this medication has been linked to serious infections; higher rate of mortality; malignancy and lymphoproliferative disorders; major adverse cardiovascular events; thrombosis; thrombocytopenia, anemia, and neutropenia; and lipid elevations.
Albendazole Pregnancy And Lactation Text: This medication is Pregnancy Category C and it isn't known if it is safe during pregnancy. It is also excreted in breast milk.
Topical Ketoconazole Counseling: Patient counseled that this medication may cause skin irritation or allergic reactions.  In the event of skin irritation, the patient was advised to reduce the amount of the drug applied or use it less frequently.   The patient verbalized understanding of the proper use and possible adverse effects of ketoconazole.  All of the patient's questions and concerns were addressed.
Valtrex Pregnancy And Lactation Text: this medication is Pregnancy Category B and is considered safe during pregnancy. This medication is not directly found in breast milk but it's metabolite acyclovir is present.
Solaraze Pregnancy And Lactation Text: This medication is Pregnancy Category B and is considered safe. There is some data to suggest avoiding during the third trimester. It is unknown if this medication is excreted in breast milk.
Ketoconazole Pregnancy And Lactation Text: This medication is Pregnancy Category C and it isn't know if it is safe during pregnancy. It is also excreted in breast milk and breast feeding isn't recommended.
Opioid Counseling: I discussed with the patient the potential side effects of opioids including but not limited to addiction, altered mental status, and depression. I stressed avoiding alcohol, benzodiazepines, muscle relaxants and sleep aids unless specifically okayed by a physician. The patient verbalized understanding of the proper use and possible adverse effects of opioids. All of the patient's questions and concerns were addressed. They were instructed to flush the remaining pills down the toilet if they did not need them for pain.
Calcipotriene Pregnancy And Lactation Text: The use of this medication during pregnancy or lactation is not recommended as there is insufficient data.
Azathioprine Counseling:  I discussed with the patient the risks of azathioprine including but not limited to myelosuppression, immunosuppression, hepatotoxicity, lymphoma, and infections.  The patient understands that monitoring is required including baseline LFTs, Creatinine, possible TPMP genotyping and weekly CBCs for the first month and then every 2 weeks thereafter.  The patient verbalized understanding of the proper use and possible adverse effects of azathioprine.  All of the patient's questions and concerns were addressed.
Ivermectin Counseling:  Patient instructed to take medication on an empty stomach with a full glass of water.  Patient informed of potential adverse effects including but not limited to nausea, diarrhea, dizziness, itching, and swelling of the extremities or lymph nodes.  The patient verbalized understanding of the proper use and possible adverse effects of ivermectin.  All of the patient's questions and concerns were addressed.
Litfulo Pregnancy And Lactation Text: Based on animal studies, Lifulo may cause embryo-fetal harm when administered to pregnant women.  The medication should not be used in pregnancy.  Breastfeeding is not recommended during treatment.
Cosentyx Counseling:  I discussed with the patient the risks of Cosentyx including but not limited to worsening of Crohn's disease, immunosuppression, allergic reactions and infections.  The patient understands that monitoring is required including a PPD at baseline and must alert us or the primary physician if symptoms of infection or other concerning signs are noted.
Propranolol Pregnancy And Lactation Text: This medication is Pregnancy Category C and it isn't known if it is safe during pregnancy. It is excreted in breast milk.
Dapsone Counseling: I discussed with the patient the risks of dapsone including but not limited to hemolytic anemia, agranulocytosis, rashes, methemoglobinemia, kidney failure, peripheral neuropathy, headaches, GI upset, and liver toxicity.  Patients who start dapsone require monitoring including baseline LFTs and weekly CBCs for the first month, then every month thereafter.  The patient verbalized understanding of the proper use and possible adverse effects of dapsone.  All of the patient's questions and concerns were addressed.
Dutasteride Female Counseling: Dutasteride Counseling:  I discussed with the patient the risks of use of dutasteride including but not limited to decreased libido and sexual dysfunction. Explained the teratogenic nature of the medication and stressed the importance of not getting pregnant during treatment. All of the patient's questions and concerns were addressed.
Low Dose Naltrexone Counseling- I discussed with the patient the potential risks and side effects of low dose naltrexone including but not limited to: more vivid dreams, headaches, nausea, vomiting, abdominal pain, fatigue, dizziness, and anxiety.
Calcipotriene Counseling:  I discussed with the patient the risks of calcipotriene including but not limited to erythema, scaling, itching, and irritation.
Oral Minoxidil Pregnancy And Lactation Text: This medication should only be used when clearly needed if you are pregnant, attempting to become pregnant or breast feeding.
Clindamycin Counseling: I counseled the patient regarding use of clindamycin as an antibiotic for prophylactic and/or therapeutic purposes. Clindamycin is active against numerous classes of bacteria, including skin bacteria. Side effects may include nausea, diarrhea, gastrointestinal upset, rash, hives, yeast infections, and in rare cases, colitis.
Hydroquinone Counseling:  Patient advised that medication may result in skin irritation, lightening (hypopigmentation), dryness, and burning.  In the event of skin irritation, the patient was advised to reduce the amount of the drug applied or use it less frequently.  Rarely, spots that are treated with hydroquinone can become darker (pseudoochronosis).  Should this occur, patient instructed to stop medication and call the office. The patient verbalized understanding of the proper use and possible adverse effects of hydroquinone.  All of the patient's questions and concerns were addressed.
Low Dose Naltrexone Pregnancy And Lactation Text: Naltrexone is pregnancy category C.  There have been no adequate and well-controlled studies in pregnant women.  It should be used in pregnancy only if the potential benefit justifies the potential risk to the fetus.   Limited data indicates that naltrexone is minimally excreted into breastmilk.
Clindamycin Pregnancy And Lactation Text: This medication can be used in pregnancy if certain situations. Clindamycin is also present in breast milk.
Cantharidin Counseling:  I discussed with the patient the risks of Cantharidin including but not limited to pain, redness, burning, itching, and blistering.
Infliximab Counseling:  I discussed with the patient the risks of infliximab including but not limited to myelosuppression, immunosuppression, autoimmune hepatitis, demyelinating diseases, lymphoma, and serious infections.  The patient understands that monitoring is required including a PPD at baseline and must alert us or the primary physician if symptoms of infection or other concerning signs are noted.
Opzelura Pregnancy And Lactation Text: There is insufficient data to evaluate drug-associated risk for major birth defects, miscarriage, or other adverse maternal or fetal outcomes.  There is a pregnancy registry that monitors pregnancy outcomes in pregnant persons exposed to the medication during pregnancy.  It is unknown if this medication is excreted in breast milk.  Do not breastfeed during treatment and for about 4 weeks after the last dose.
Aklief counseling:  Patient advised to apply a pea-sized amount only at bedtime and wait 30 minutes after washing their face before applying.  If too drying, patient may add a non-comedogenic moisturizer.  The most commonly reported side effects including irritation, redness, scaling, dryness, stinging, burning, itching, and increased risk of sunburn.  The patient verbalized understanding of the proper use and possible adverse effects of retinoids.  All of the patient's questions and concerns were addressed.
Dutasteride Pregnancy And Lactation Text: This medication is absolutely contraindicated in women, especially during pregnancy and breast feeding. Feminization of male fetuses is possible if taking while pregnant.
Acitretin Counseling:  I discussed with the patient the risks of acitretin including but not limited to hair loss, dry lips/skin/eyes, liver damage, hyperlipidemia, depression/suicidal ideation, photosensitivity.  Serious rare side effects can include but are not limited to pancreatitis, pseudotumor cerebri, bony changes, clot formation/stroke/heart attack.  Patient understands that alcohol is contraindicated since it can result in liver toxicity and significantly prolong the elimination of the drug by many years.
Cyclosporine Pregnancy And Lactation Text: This medication is Pregnancy Category C and it isn't know if it is safe during pregnancy. This medication is excreted in breast milk.
Soolantra Counseling: I discussed with the patients the risks of topial Soolantra. This is a medicine which decreases the number of mites and inflammation in the skin. You experience burning, stinging, eye irritation or allergic reactions.  Please call our office if you develop any problems from using this medication.
Winlevi Counseling:  I discussed with the patient the risks of topical clascoterone including but not limited to erythema, scaling, itching, and stinging. Patient voiced their understanding.
Terbinafine Counseling: Patient counseling regarding adverse effects of terbinafine including but not limited to headache, diarrhea, rash, upset stomach, liver function test abnormalities, itching, taste/smell disturbance, nausea, abdominal pain, and flatulence.  There is a rare possibility of liver failure that can occur when taking terbinafine.  The patient understands that a baseline LFT and kidney function test may be required. The patient verbalized understanding of the proper use and possible adverse effects of terbinafine.  All of the patient's questions and concerns were addressed.
Erivedge Counseling- I discussed with the patient the risks of Erivedge including but not limited to nausea, vomiting, diarrhea, constipation, weight loss, changes in the sense of taste, decreased appetite, muscle spasms, and hair loss.  The patient verbalized understanding of the proper use and possible adverse effects of Erivedge.  All of the patient's questions and concerns were addressed.
Terbinafine Pregnancy And Lactation Text: This medication is Pregnancy Category B and is considered safe during pregnancy. It is also excreted in breast milk and breast feeding isn't recommended.
Opioid Pregnancy And Lactation Text: These medications can lead to premature delivery and should be avoided during pregnancy. These medications are also present in breast milk in small amounts.
Quinolones Counseling:  I discussed with the patient the risks of fluoroquinolones including but not limited to GI upset, allergic reaction, drug rash, diarrhea, dizziness, photosensitivity, yeast infections, liver function test abnormalities, tendonitis/tendon rupture.
Cantharidin Pregnancy And Lactation Text: This medication has not been proven safe during pregnancy. It is unknown if this medication is excreted in breast milk.
SSKI Counseling:  I discussed with the patient the risks of SSKI including but not limited to thyroid abnormalities, metallic taste, GI upset, fever, headache, acne, arthralgias, paraesthesias, lymphadenopathy, easy bleeding, arrhythmias, and allergic reaction.
Otezla Counseling: The side effects of Otezla were discussed with the patient, including but not limited to worsening or new depression, weight loss, diarrhea, nausea, upper respiratory tract infection, and headache. Patient instructed to call the office should any adverse effect occur.  The patient verbalized understanding of the proper use and possible adverse effects of Otezla.  All the patient's questions and concerns were addressed.
Fluconazole Counseling:  Patient counseled regarding adverse effects of fluconazole including but not limited to headache, diarrhea, nausea, upset stomach, liver function test abnormalities, taste disturbance, and stomach pain.  There is a rare possibility of liver failure that can occur when taking fluconazole.  The patient understands that monitoring of LFTs and kidney function test may be required, especially at baseline. The patient verbalized understanding of the proper use and possible adverse effects of fluconazole.  All of the patient's questions and concerns were addressed.
Olumiant Counseling: I discussed with the patient the risks of Olumiant therapy including but not limited to upper respiratory tract infections, shingles, cold sores, and nausea. Live vaccines should be avoided.  This medication has been linked to serious infections; higher rate of mortality; malignancy and lymphoproliferative disorders; major adverse cardiovascular events; thrombosis; gastrointestinal perforations; neutropenia; lymphopenia; anemia; liver enzyme elevations; and lipid elevations.
Dapsone Pregnancy And Lactation Text: This medication is Pregnancy Category C and is not considered safe during pregnancy or breast feeding.
Doxycycline Counseling:  Patient counseled regarding possible photosensitivity and increased risk for sunburn.  Patient instructed to avoid sunlight, if possible.  When exposed to sunlight, patients should wear protective clothing, sunglasses, and sunscreen.  The patient was instructed to call the office immediately if the following severe adverse effects occur:  hearing changes, easy bruising/bleeding, severe headache, or vision changes.  The patient verbalized understanding of the proper use and possible adverse effects of doxycycline.  All of the patient's questions and concerns were addressed.
5-Fu Counseling: 5-Fluorouracil Counseling:  I discussed with the patient the risks of 5-fluorouracil including but not limited to erythema, scaling, itching, weeping, crusting, and pain.
Finasteride Male Counseling: Finasteride Counseling:  I discussed with the patient the risks of use of finasteride including but not limited to decreased libido, decreased ejaculate volume, gynecomastia, and depression. Women should not handle medication.  All of the patient's questions and concerns were addressed.
Otezla Pregnancy And Lactation Text: This medication is Pregnancy Category C and it isn't known if it is safe during pregnancy. It is unknown if it is excreted in breast milk.
Sski Pregnancy And Lactation Text: This medication is Pregnancy Category D and isn't considered safe during pregnancy. It is excreted in breast milk.
Stelara Counseling:  I discussed with the patient the risks of ustekinumab including but not limited to immunosuppression, malignancy, posterior leukoencephalopathy syndrome, and serious infections.  The patient understands that monitoring is required including a PPD at baseline and must alert us or the primary physician if symptoms of infection or other concerning signs are noted.
Dupixent Counseling: I discussed with the patient the risks of dupilumab including but not limited to eye infection and irritation, cold sores, injection site reactions, worsening of asthma, allergic reactions and increased risk of parasitic infection.  Live vaccines should be avoided while taking dupilumab. Dupilumab will also interact with certain medications such as warfarin and cyclosporine. The patient understands that monitoring is required and they must alert us or the primary physician if symptoms of infection or other concerning signs are noted.
Winlevi Pregnancy And Lactation Text: This medication is considered safe during pregnancy and breastfeeding.
Topical Metronidazole Counseling: Metronidazole is a topical antibiotic medication. You may experience burning, stinging, redness, or allergic reactions.  Please call our office if you develop any problems from using this medication.
Acitretin Pregnancy And Lactation Text: This medication is Pregnancy Category X and should not be given to women who are pregnant or may become pregnant in the future. This medication is excreted in breast milk.
Soolantra Pregnancy And Lactation Text: This medication is Pregnancy Category C. This medication is considered safe during breast feeding.
Picato Counseling:  I discussed with the patient the risks of Picato including but not limited to erythema, scaling, itching, weeping, crusting, and pain.
Niacinamide Counseling: I recommended taking niacin or niacinamide, also know as vitamin B3, twice daily. Recent evidence suggests that taking vitamin B3 (500 mg twice daily) can reduce the risk of actinic keratoses and non-melanoma skin cancers. Side effects of vitamin B3 include flushing and headache.
Aklief Pregnancy And Lactation Text: It is unknown if this medication is safe to use during pregnancy.  It is unknown if this medication is excreted in breast milk.  Breastfeeding women should use the topical cream on the smallest area of the skin for the shortest time needed while breastfeeding.  Do not apply to nipple and areola.
Methotrexate Counseling:  Patient counseled regarding adverse effects of methotrexate including but not limited to nausea, vomiting, abnormalities in liver function tests. Patients may develop mouth sores, rash, diarrhea, and abnormalities in blood counts. The patient understands that monitoring is required including LFT's and blood counts.  There is a rare possibility of scarring of the liver and lung problems that can occur when taking methotrexate. Persistent nausea, loss of appetite, pale stools, dark urine, cough, and shortness of breath should be reported immediately. Patient advised to discontinue methotrexate treatment at least three months before attempting to become pregnant.  I discussed the need for folate supplements while taking methotrexate.  These supplements can decrease side effects during methotrexate treatment. The patient verbalized understanding of the proper use and possible adverse effects of methotrexate.  All of the patient's questions and concerns were addressed.
Protopic Pregnancy And Lactation Text: This medication is Pregnancy Category C. It is unknown if this medication is excreted in breast milk when applied topically.
VTAMA Counseling: I discussed with the patient that VTAMA is not for use in the eyes, mouth or mouth. They should call the office if they develop any signs of allergic reactions to VTAMA. The patient verbalized understanding of the proper use and possible adverse effects of VTAMA.  All of the patient's questions and concerns were addressed.
Bexarotene Counseling:  I discussed with the patient the risks of bexarotene including but not limited to hair loss, dry lips/skin/eyes, liver abnormalities, hyperlipidemia, pancreatitis, depression/suicidal ideation, photosensitivity, drug rash/allergic reactions, hypothyroidism, anemia, leukopenia, infection, cataracts, and teratogenicity.  Patient understands that they will need regular blood tests to check lipid profile, liver function tests, white blood cell count, thyroid function tests and pregnancy test if applicable.
Topical Metronidazole Pregnancy And Lactation Text: This medication is Pregnancy Category B and considered safe during pregnancy.  It is also considered safe to use while breastfeeding.
Topical Retinoid counseling:  Patient advised to apply a pea-sized amount only at bedtime and wait 30 minutes after washing their face before applying.  If too drying, patient may add a non-comedogenic moisturizer. The patient verbalized understanding of the proper use and possible adverse effects of retinoids.  All of the patient's questions and concerns were addressed.
Cellcept Counseling:  I discussed with the patient the risks of mycophenolate mofetil including but not limited to infection/immunosuppression, GI upset, hypokalemia, hypercholesterolemia, bone marrow suppression, lymphoproliferative disorders, malignancy, GI ulceration/bleed/perforation, colitis, interstitial lung disease, kidney failure, progressive multifocal leukoencephalopathy, and birth defects.  The patient understands that monitoring is required including a baseline creatinine and regular CBC testing. In addition, patient must alert us immediately if symptoms of infection or other concerning signs are noted.
Olumiant Pregnancy And Lactation Text: Based on animal studies, Olumiant may cause embryo-fetal harm when administered to pregnant women.  The medication should not be used in pregnancy.  Breastfeeding is not recommended during treatment.
Gabapentin Counseling: I discussed with the patient the risks of gabapentin including but not limited to dizziness, somnolence, fatigue and ataxia.
Oxybutynin Counseling:  I discussed with the patient the risks of oxybutynin including but not limited to skin rash, drowsiness, dry mouth, difficulty urinating, and blurred vision.
Dupixent Pregnancy And Lactation Text: This medication likely crosses the placenta but the risk for the fetus is uncertain. This medication is excreted in breast milk.
Cimetidine Counseling:  I discussed with the patient the risks of Cimetidine including but not limited to gynecomastia, headache, diarrhea, nausea, drowsiness, arrhythmias, pancreatitis, skin rashes, psychosis, bone marrow suppression and kidney toxicity.
Doxycycline Pregnancy And Lactation Text: This medication is Pregnancy Category D and not consider safe during pregnancy. It is also excreted in breast milk but is considered safe for shorter treatment courses.
Imiquimod Counseling:  I discussed with the patient the risks of imiquimod including but not limited to erythema, scaling, itching, weeping, crusting, and pain.  Patient understands that the inflammatory response to imiquimod is variable from person to person and was educated regarded proper titration schedule.  If flu-like symptoms develop, patient knows to discontinue the medication and contact us.
Finasteride Female Counseling: Finasteride Counseling:  I discussed with the patient the risks of use of finasteride including but not limited to decreased libido and sexual dysfunction. Explained the teratogenic nature of the medication and stressed the importance of not getting pregnant during treatment. All of the patient's questions and concerns were addressed.
Arava Counseling:  Patient counseled regarding adverse effects of Arava including but not limited to nausea, vomiting, abnormalities in liver function tests. Patients may develop mouth sores, rash, diarrhea, and abnormalities in blood counts. The patient understands that monitoring is required including LFTs and blood counts.  There is a rare possibility of scarring of the liver and lung problems that can occur when taking methotrexate. Persistent nausea, loss of appetite, pale stools, dark urine, cough, and shortness of breath should be reported immediately. Patient advised to discontinue Arava treatment and consult with a physician prior to attempting conception. The patient will have to undergo a treatment to eliminate Arava from the body prior to conception.
Niacinamide Pregnancy And Lactation Text: These medications are considered safe during pregnancy.
Azithromycin Counseling:  I discussed with the patient the risks of azithromycin including but not limited to GI upset, allergic reaction, drug rash, diarrhea, and yeast infections.
Adbry Counseling: I discussed with the patient the risks of tralokinumab including but not limited to eye infection and irritation, cold sores, injection site reactions, worsening of asthma, allergic reactions and increased risk of parasitic infection.  Live vaccines should be avoided while taking tralokinumab. The patient understands that monitoring is required and they must alert us or the primary physician if symptoms of infection or other concerning signs are noted.
Azelaic Acid Counseling: Patient counseled that medicine may cause skin irritation and to avoid applying near the eyes.  In the event of skin irritation, the patient was advised to reduce the amount of the drug applied or use it less frequently.   The patient verbalized understanding of the proper use and possible adverse effects of azelaic acid.  All of the patient's questions and concerns were addressed.
Methotrexate Pregnancy And Lactation Text: This medication is Pregnancy Category X and is known to cause fetal harm. This medication is excreted in breast milk.
Doxepin Counseling:  Patient advised that the medication is sedating and not to drive a car after taking this medication. Patient informed of potential adverse effects including but not limited to dry mouth, urinary retention, and blurry vision.  The patient verbalized understanding of the proper use and possible adverse effects of doxepin.  All of the patient's questions and concerns were addressed.
Rituxan Counseling:  I discussed with the patient the risks of Rituxan infusions. Side effects can include infusion reactions, severe drug rashes including mucocutaneous reactions, reactivation of latent hepatitis and other infections and rarely progressive multifocal leukoencephalopathy.  All of the patient's questions and concerns were addressed.
Prednisone Counseling:  I discussed with the patient the risks of prolonged use of prednisone including but not limited to weight gain, insomnia, osteoporosis, mood changes, diabetes, susceptibility to infection, glaucoma and high blood pressure.  In cases where prednisone use is prolonged, patients should be monitored with blood pressure checks, serum glucose levels and an eye exam.  Additionally, the patient may need to be placed on GI prophylaxis, PCP prophylaxis, and calcium and vitamin D supplementation and/or a bisphosphonate.  The patient verbalized understanding of the proper use and the possible adverse effects of prednisone.  All of the patient's questions and concerns were addressed.
Klisyri Pregnancy And Lactation Text: It is unknown if this medication can harm a developing fetus or if it is excreted in breast milk.
Protopic Counseling: Patient may experience a mild burning sensation during topical application. Protopic is not approved in children less than 2 years of age. There have been case reports of hematologic and skin malignancies in patients using topical calcineurin inhibitors although causality is questionable.
Rifampin Counseling: I discussed with the patient the risks of rifampin including but not limited to liver damage, kidney damage, red-orange body fluids, nausea/vomiting and severe allergy.
Topical Steroids Counseling: I discussed with the patient that prolonged use of topical steroids can result in the increased appearance of superficial blood vessels (telangiectasias), lightening (hypopigmentation) and thinning of the skin (atrophy).  Patient understands to avoid using high potency steroids in skin folds, the groin or the face.  The patient verbalized understanding of the proper use and possible adverse effects of topical steroids.  All of the patient's questions and concerns were addressed.
Rinvoq Counseling: I discussed with the patient the risks of Rinvoq therapy including but not limited to upper respiratory tract infections, shingles, cold sores, bronchitis, nausea, cough, fever, acne, and headache. Live vaccines should be avoided.  This medication has been linked to serious infections; higher rate of mortality; malignancy and lymphoproliferative disorders; major adverse cardiovascular events; thrombosis; thrombocytopenia, anemia, and neutropenia; lipid elevations; liver enzyme elevations; and gastrointestinal perforations.
Thalidomide Counseling: I discussed with the patient the risks of thalidomide including but not limited to birth defects, anxiety, weakness, chest pain, dizziness, cough and severe allergy.
Griseofulvin Counseling:  I discussed with the patient the risks of griseofulvin including but not limited to photosensitivity, cytopenia, liver damage, nausea/vomiting and severe allergy.  The patient understands that this medication is best absorbed when taken with a fatty meal (e.g., ice cream or french fries).
Qbrexza Counseling:  I discussed with the patient the risks of Qbrexza including but not limited to headache, mydriasis, blurred vision, dry eyes, nasal dryness, dry mouth, dry throat, dry skin, urinary hesitation, and constipation.  Local skin reactions including erythema, burning, stinging, and itching can also occur.
Libtayo Counseling- I discussed with the patient the risks of Libtayo including but not limited to nausea, vomiting, diarrhea, and bone or muscle pain.  The patient verbalized understanding of the proper use and possible adverse effects of Libtayo.  All of the patient's questions and concerns were addressed.
Rinvoq Pregnancy And Lactation Text: Based on animal studies, Rinvoq may cause embryo-fetal harm when administered to pregnant women.  The medication should not be used in pregnancy.  Breastfeeding is not recommended during treatment and for 6 days after the last dose.
Griseofulvin Pregnancy And Lactation Text: This medication is Pregnancy Category X and is known to cause serious birth defects. It is unknown if this medication is excreted in breast milk but breast feeding should be avoided.
Qbrexza Pregnancy And Lactation Text: There is no available data on Qbrexza use in pregnant women.  There is no available data on Qbrexza use in lactation.
Erythromycin Counseling:  I discussed with the patient the risks of erythromycin including but not limited to GI upset, allergic reaction, drug rash, diarrhea, increase in liver enzymes, and yeast infections.
Enbrel Counseling:  I discussed with the patient the risks of etanercept including but not limited to myelosuppression, immunosuppression, autoimmune hepatitis, demyelinating diseases, lymphoma, and infections.  The patient understands that monitoring is required including a PPD at baseline and must alert us or the primary physician if symptoms of infection or other concerning signs are noted.
Taltz Counseling: I discussed with the patient the risks of ixekizumab including but not limited to immunosuppression, serious infections, worsening of inflammatory bowel disease and drug reactions.  The patient understands that monitoring is required including a PPD at baseline and must alert us or the primary physician if symptoms of infection or other concerning signs are noted.
Adbry Pregnancy And Lactation Text: It is unknown if this medication will adversely affect pregnancy or breast feeding.
Doxepin Pregnancy And Lactation Text: This medication is Pregnancy Category C and it isn't known if it is safe during pregnancy. It is also excreted in breast milk and breast feeding isn't recommended.
Drysol Counseling:  I discussed with the patient the risks of drysol/aluminum chloride including but not limited to skin rash, itching, irritation, burning.
Azithromycin Pregnancy And Lactation Text: This medication is considered safe during pregnancy and is also secreted in breast milk.
Bexarotene Pregnancy And Lactation Text: This medication is Pregnancy Category X and should not be given to women who are pregnant or may become pregnant. This medication should not be used if you are breast feeding.
Finasteride Pregnancy And Lactation Text: This medication is absolutely contraindicated during pregnancy. It is unknown if it is excreted in breast milk.
Nsaids Counseling: NSAID Counseling: I discussed with the patient that NSAIDs should be taken with food. Prolonged use of NSAIDs can result in the development of stomach ulcers.  Patient advised to stop taking NSAIDs if abdominal pain occurs.  The patient verbalized understanding of the proper use and possible adverse effects of NSAIDs.  All of the patient's questions and concerns were addressed.
Rituxan Pregnancy And Lactation Text: This medication is Pregnancy Category C and it isn't know if it is safe during pregnancy. It is unknown if this medication is excreted in breast milk but similar antibodies are known to be excreted.
Klisyri Counseling:  I discussed with the patient the risks of Klisyri including but not limited to erythema, scaling, itching, weeping, crusting, and pain.
Siliq Counseling:  I discussed with the patient the risks of Siliq including but not limited to new or worsening depression, suicidal thoughts and behavior, immunosuppression, malignancy, posterior leukoencephalopathy syndrome, and serious infections.  The patient understands that monitoring is required including a PPD at baseline and must alert us or the primary physician if symptoms of infection or other concerning signs are noted. There is also a special program designed to monitor depression which is required with Siliq.
Zoryve Counseling:  I discussed with the patient that Zoryve is not for use in the eyes, mouth or vagina. The most commonly reported side effects include diarrhea, headache, insomnia, application site pain, upper respiratory tract infections, and urinary tract infections.  All of the patient's questions and concerns were addressed.
Benzoyl Peroxide Counseling: Patient counseled that medicine may cause skin irritation and bleach clothing.  In the event of skin irritation, the patient was advised to reduce the amount of the drug applied or use it less frequently.   The patient verbalized understanding of the proper use and possible adverse effects of benzoyl peroxide.  All of the patient's questions and concerns were addressed.
Bactrim Counseling:  I discussed with the patient the risks of sulfa antibiotics including but not limited to GI upset, allergic reaction, drug rash, diarrhea, dizziness, photosensitivity, and yeast infections.  Rarely, more serious reactions can occur including but not limited to aplastic anemia, agranulocytosis, methemoglobinemia, blood dyscrasias, liver or kidney failure, lung infiltrates or desquamative/blistering drug rashes.
Birth Control Pills Counseling: Birth Control Pill Counseling: I discussed with the patient the potential side effects of OCPs including but not limited to increased risk of stroke, heart attack, thrombophlebitis, deep venous thrombosis, hepatic adenomas, breast changes, GI upset, headaches, and depression.  The patient verbalized understanding of the proper use and possible adverse effects of OCPs. All of the patient's questions and concerns were addressed.
Isotretinoin Counseling: Patient should get monthly blood tests, not donate blood, not drive at night if vision affected, not share medication, and not undergo elective surgery for 6 months after tx completed. Side effects reviewed, pt to contact office should one occur.
Glycopyrrolate Counseling:  I discussed with the patient the risks of glycopyrrolate including but not limited to skin rash, drowsiness, dry mouth, difficulty urinating, and blurred vision.
Minoxidil Counseling: Minoxidil is a topical medication which can increase blood flow where it is applied. It is uncertain how this medication increases hair growth. Side effects are uncommon and include stinging and allergic reactions.
Rifampin Pregnancy And Lactation Text: This medication is Pregnancy Category C and it isn't know if it is safe during pregnancy. It is also excreted in breast milk and should not be used if you are breast feeding.
Topical Steroids Applications Pregnancy And Lactation Text: Most topical steroids are considered safe to use during pregnancy and lactation.  Any topical steroid applied to the breast or nipple should be washed off before breastfeeding.
Tazorac Counseling:  Patient advised that medication is irritating and drying.  Patient may need to apply sparingly and wash off after an hour before eventually leaving it on overnight.  The patient verbalized understanding of the proper use and possible adverse effects of tazorac.  All of the patient's questions and concerns were addressed.
Libtayo Pregnancy And Lactation Text: This medication is contraindicated in pregnancy and when breast feeding.

## 2024-03-27 NOTE — PROGRESS NOTES
is here for a follow up visit for right Severino B distal fibula fracture that is being treated nonoperatively due to underlying comorbidities including colon and liver cancer on chemotherapy.     Patient reports pain is controlled.    The patient denies fevers, chills, nausea, vomiting, numbness/paresthesias.  The patient has been following the weight bearing precautions: WBAT RLE in [facility].  The patient has not started physical therapy.     Current Outpatient Medications on File Prior to Visit   Medication Sig Dispense Refill    buPROPion (WELLBUTRIN XL) 150 MG extended release tablet Take 1 tablet by mouth daily      omeprazole (PRILOSEC) 20 MG delayed release capsule Take 1 capsule by mouth daily      OXcarbazepine (TRILEPTAL) 300 MG tablet Take 1 tablet by mouth nightly (Patient not taking: Reported on 1/29/2024)      QUEtiapine (SEROQUEL) 100 MG tablet Take 1 tablet by mouth daily      triamcinolone (KENALOG) 0.1 % ointment Apply topically daily       No current facility-administered medications on file prior to visit.       ROS:  General: denies agitation, fevers/chills  Cardiac: denies major chest pain  Gastrointestinal: denies nausea/vomiting  Neurologic: Denies numbness/paresthesias  Skin: Denies erythema, warmth to touch, active drainage  Musculoskeletal: Endorses appropriate pain at surgical site      Physical Examination:    There were no vitals taken for this visit.    GENERAL: Patient is a healthy-appearing and in no apparent distress. Afebrile, normotensive, regular rate  MENTAL STATUS: Alert and oriented to time, place, person; mood and affect normal.  PULMONARY: Respiratory rate normal and non-labored on room air.  GASTROINTESTINAL: Nondistended abdomen  CARDIAC: Regular rate and rhythm. Without pitting edema of affected extremity and peripheral pulses normal unless otherwise noted.  SKIN: No obvious lacerations or cutaneous disruptions in examined extremity unless otherwise

## 2024-03-27 NOTE — PROGRESS NOTES
Identified pt with two pt identifiers (name and ). Reviewed chart in preparation for visit and have obtained necessary documentation.    Jessie Ruiz is a 58 y.o. female Follow-up (Reason for Visit: RT Ankle FX/Pain level: 5/Patient states there is pain in shin//)  .    Vitals:    24 1104   BP: 105/73   Site: Right Upper Arm   Position: Sitting   Cuff Size: Large Adult   Pulse: 87   Resp: 18   Temp: 97.5 °F (36.4 °C)   TempSrc: Oral   SpO2: 95%   Weight: 81.6 kg (180 lb)   Height: 1.499 m (4' 11\")          1. Have you been to the ER, urgent care clinic since your last visit?  Hospitalized since your last visit?  no     2. Have you seen or consulted any other health care providers outside of the [facility] since your last visit?  Include any pap smears or colon screening.  no

## 2024-04-03 DIAGNOSIS — S82.831A CLOSED FRACTURE OF DISTAL END OF RIGHT FIBULA, UNSPECIFIED FRACTURE MORPHOLOGY, INITIAL ENCOUNTER: Primary | ICD-10-CM

## 2024-04-24 ENCOUNTER — APPOINTMENT (RX ONLY)
Dept: URBAN - METROPOLITAN AREA CLINIC 1 | Facility: CLINIC | Age: 59
Setting detail: DERMATOLOGY
End: 2024-04-24

## 2024-04-24 DIAGNOSIS — L21.8 OTHER SEBORRHEIC DERMATITIS: ICD-10-CM | Status: WELL CONTROLLED

## 2024-04-24 DIAGNOSIS — L65.9 NONSCARRING HAIR LOSS, UNSPECIFIED: ICD-10-CM

## 2024-04-24 DIAGNOSIS — L20.89 OTHER ATOPIC DERMATITIS: ICD-10-CM | Status: INADEQUATELY CONTROLLED

## 2024-04-24 PROCEDURE — ? PHOTO-DOCUMENTATION

## 2024-04-24 PROCEDURE — ? PRESCRIPTION

## 2024-04-24 PROCEDURE — ? COUNSELING

## 2024-04-24 PROCEDURE — 11901 INJECT SKIN LESIONS >7: CPT

## 2024-04-24 PROCEDURE — ? PRESCRIPTION MEDICATION MANAGEMENT

## 2024-04-24 PROCEDURE — ? INTRALESIONAL KENALOG

## 2024-04-24 PROCEDURE — 99214 OFFICE O/P EST MOD 30 MIN: CPT | Mod: 25

## 2024-04-24 RX ORDER — KETOCONAZOLE 20 MG/ML
SHAMPOO, SUSPENSION TOPICAL
Qty: 120 | Refills: 3 | Status: ERX

## 2024-04-24 RX ORDER — CLOBETASOL PROPIONATE 0.5 MG/G
CREAM TOPICAL
Qty: 45 | Refills: 2 | Status: ERX

## 2024-04-24 ASSESSMENT — LOCATION DETAILED DESCRIPTION DERM
LOCATION DETAILED: LEFT RADIAL DORSAL HAND
LOCATION DETAILED: SUPERIOR MID FOREHEAD
LOCATION DETAILED: LEFT CENTRAL PARIETAL SCALP
LOCATION DETAILED: RIGHT MEDIAL FRONTAL SCALP
LOCATION DETAILED: RIGHT SUPERIOR FOREHEAD
LOCATION DETAILED: RIGHT CENTRAL FRONTAL SCALP
LOCATION DETAILED: MID-FRONTAL SCALP
LOCATION DETAILED: RIGHT CENTRAL PARIETAL SCALP
LOCATION DETAILED: LEFT SUPERIOR PARIETAL SCALP
LOCATION DETAILED: LEFT MEDIAL FRONTAL SCALP
LOCATION DETAILED: LEFT CENTRAL FRONTAL SCALP
LOCATION DETAILED: POSTERIOR MID-PARIETAL SCALP
LOCATION DETAILED: RIGHT SUPERIOR PARIETAL SCALP

## 2024-04-24 ASSESSMENT — LOCATION SIMPLE DESCRIPTION DERM
LOCATION SIMPLE: ANTERIOR SCALP
LOCATION SIMPLE: RIGHT SCALP
LOCATION SIMPLE: LEFT HAND
LOCATION SIMPLE: RIGHT FOREHEAD
LOCATION SIMPLE: SUPERIOR FOREHEAD
LOCATION SIMPLE: SCALP
LOCATION SIMPLE: LEFT SCALP
LOCATION SIMPLE: POSTERIOR SCALP

## 2024-04-24 ASSESSMENT — LOCATION ZONE DERM
LOCATION ZONE: FACE
LOCATION ZONE: SCALP
LOCATION ZONE: HAND

## 2024-04-24 NOTE — PROCEDURE: PRESCRIPTION MEDICATION MANAGEMENT
Plan: S/P 6 months Minoxidil 8% compound
Detail Level: Zone
Render In Strict Bullet Format?: No
Continue Regimen: minoxidil 8%, 0.05% Fluocinonide (bulk) in castor oil , apply to scalp qd\\nClobetasol ointment QHS
Continue Regimen: betamethasone dipropionate 0.05 % topical ointment: Apply to the scalp once weekly for itch and flake.\\n\\nketoconozole shampoo to scalp weekly
Continue Regimen: Apply to the eczema flares on the body BID x 2 weeks prn flares.

## 2024-04-24 NOTE — PROCEDURE: INTRALESIONAL KENALOG
Kenalog Type Of Vial: Multiple Dose
How Many Mls Were Removed From The 10 Mg/Ml (5ml) Vial When Preparing The Injectable Solution?: 0
Ndc# For Kenalog Only: 222751350
Kenalog Preparation: Kenalog
Validate Note Data When Using Inventory: Yes
Detail Level: Simple
Require Ndc Code?: No
Total Volume (Ccs): 1.0
Medical Necessity Clause: This procedure was medically necessary because the lesions that were treated were:
Concentration Of Kenalog Solution Injected (Mg/Ml): 4.0
Administered By (Optional): Naz Quinones
Consent: The risks of atrophy were reviewed with the patient.

## 2024-05-07 ENCOUNTER — HOSPITAL ENCOUNTER (OUTPATIENT)
Facility: HOSPITAL | Age: 59
Discharge: HOME | End: 2024-05-10
Payer: COMMERCIAL

## 2024-05-07 DIAGNOSIS — S82.831A CLOSED FRACTURE OF DISTAL END OF RIGHT FIBULA, UNSPECIFIED FRACTURE MORPHOLOGY, INITIAL ENCOUNTER: ICD-10-CM

## 2024-05-07 PROCEDURE — 73610 X-RAY EXAM OF ANKLE: CPT

## 2024-05-09 ENCOUNTER — OFFICE VISIT (OUTPATIENT)
Age: 59
End: 2024-05-09
Payer: COMMERCIAL

## 2024-05-09 VITALS
SYSTOLIC BLOOD PRESSURE: 122 MMHG | DIASTOLIC BLOOD PRESSURE: 87 MMHG | WEIGHT: 180 LBS | HEIGHT: 59 IN | HEART RATE: 68 BPM | BODY MASS INDEX: 36.29 KG/M2

## 2024-05-09 DIAGNOSIS — S82.831D CLOSED FRACTURE OF DISTAL END OF RIGHT FIBULA WITH ROUTINE HEALING, UNSPECIFIED FRACTURE MORPHOLOGY, SUBSEQUENT ENCOUNTER: Primary | ICD-10-CM

## 2024-05-09 PROCEDURE — 99213 OFFICE O/P EST LOW 20 MIN: CPT | Performed by: ORTHOPAEDIC SURGERY

## 2024-05-09 NOTE — PROGRESS NOTES
is here for a follow up visit for right Severino B distal fibula fracture that is being treated nonoperatively due to underlying comorbidities including colon and liver cancer on chemotherapy.     Patient reports pain is controlled.    The patient denies fevers, chills, nausea, vomiting, numbness/paresthesias.  The patient has been following the weight bearing precautions: WBAT RLE in regular shoe.  The patient has not started physical therapy she has been doing a home exercise program on her own.  She reports some pain over the anterior lateral ankle and swelling.    Current Outpatient Medications on File Prior to Visit   Medication Sig Dispense Refill    buPROPion (WELLBUTRIN XL) 150 MG extended release tablet Take 1 tablet by mouth daily      omeprazole (PRILOSEC) 20 MG delayed release capsule Take 1 capsule by mouth daily      OXcarbazepine (TRILEPTAL) 300 MG tablet Take 1 tablet by mouth nightly (Patient not taking: Reported on 1/29/2024)      QUEtiapine (SEROQUEL) 100 MG tablet Take 1 tablet by mouth daily      triamcinolone (KENALOG) 0.1 % ointment Apply topically daily (Patient not taking: Reported on 3/27/2024)       No current facility-administered medications on file prior to visit.       ROS:  General: denies agitation, fevers/chills  Cardiac: denies major chest pain  Gastrointestinal: denies nausea/vomiting  Neurologic: Denies numbness/paresthesias  Skin: Denies erythema, warmth to touch, active drainage  Musculoskeletal: Endorses appropriate pain at surgical site      Physical Examination:    Blood pressure 122/87, pulse 68, height 1.499 m (4' 11\"), weight 81.6 kg (180 lb).    GENERAL: Patient is a healthy-appearing and in no apparent distress. Afebrile, normotensive, regular rate  MENTAL STATUS: Alert and oriented to time, place, person; mood and affect normal.  PULMONARY: Respiratory rate normal and non-labored on room air.  GASTROINTESTINAL: Nondistended abdomen  CARDIAC: Regular rate and

## 2024-06-27 ENCOUNTER — APPOINTMENT (RX ONLY)
Dept: URBAN - METROPOLITAN AREA CLINIC 1 | Facility: CLINIC | Age: 59
Setting detail: DERMATOLOGY
End: 2024-06-27

## 2024-06-27 DIAGNOSIS — L65.9 NONSCARRING HAIR LOSS, UNSPECIFIED: ICD-10-CM | Status: IMPROVED

## 2024-06-27 DIAGNOSIS — L21.8 OTHER SEBORRHEIC DERMATITIS: ICD-10-CM | Status: IMPROVED

## 2024-06-27 DIAGNOSIS — L20.89 OTHER ATOPIC DERMATITIS: ICD-10-CM | Status: INADEQUATELY CONTROLLED

## 2024-06-27 PROCEDURE — 99214 OFFICE O/P EST MOD 30 MIN: CPT | Mod: 25

## 2024-06-27 PROCEDURE — ? PHOTO-DOCUMENTATION

## 2024-06-27 PROCEDURE — ? INTRALESIONAL KENALOG

## 2024-06-27 PROCEDURE — ? PRESCRIPTION MEDICATION MANAGEMENT

## 2024-06-27 PROCEDURE — ? COUNSELING

## 2024-06-27 PROCEDURE — 11901 INJECT SKIN LESIONS >7: CPT

## 2024-06-27 ASSESSMENT — LOCATION ZONE DERM
LOCATION ZONE: FACE
LOCATION ZONE: SCALP
LOCATION ZONE: HAND

## 2024-06-27 ASSESSMENT — LOCATION SIMPLE DESCRIPTION DERM
LOCATION SIMPLE: RIGHT SCALP
LOCATION SIMPLE: LEFT SCALP
LOCATION SIMPLE: RIGHT FOREHEAD
LOCATION SIMPLE: ANTERIOR SCALP
LOCATION SIMPLE: LEFT HAND
LOCATION SIMPLE: SUPERIOR FOREHEAD
LOCATION SIMPLE: POSTERIOR SCALP
LOCATION SIMPLE: SCALP

## 2024-06-27 ASSESSMENT — LOCATION DETAILED DESCRIPTION DERM
LOCATION DETAILED: RIGHT SUPERIOR FOREHEAD
LOCATION DETAILED: RIGHT SUPERIOR PARIETAL SCALP
LOCATION DETAILED: LEFT SUPERIOR PARIETAL SCALP
LOCATION DETAILED: MID-FRONTAL SCALP
LOCATION DETAILED: RIGHT CENTRAL FRONTAL SCALP
LOCATION DETAILED: LEFT CENTRAL PARIETAL SCALP
LOCATION DETAILED: SUPERIOR MID FOREHEAD
LOCATION DETAILED: LEFT RADIAL DORSAL HAND
LOCATION DETAILED: RIGHT CENTRAL PARIETAL SCALP
LOCATION DETAILED: LEFT MEDIAL FRONTAL SCALP
LOCATION DETAILED: POSTERIOR MID-PARIETAL SCALP
LOCATION DETAILED: RIGHT MEDIAL FRONTAL SCALP
LOCATION DETAILED: LEFT CENTRAL FRONTAL SCALP

## 2024-06-27 NOTE — PROCEDURE: MIPS QUALITY
Detail Level: Detailed
Quality 431: Preventive Care And Screening: Unhealthy Alcohol Use - Screening: Patient not identified as an unhealthy alcohol user when screened for unhealthy alcohol use using a systematic screening method
Quality 226: Preventive Care And Screening: Tobacco Use: Screening And Cessation Intervention: Patient screened for tobacco use and is an ex/non-smoker

## 2024-06-27 NOTE — PROCEDURE: PRESCRIPTION MEDICATION MANAGEMENT
Plan: S/P 8 months Minoxidil 8% compound
Detail Level: Zone
Render In Strict Bullet Format?: No
Continue Regimen: minoxidil 8%, 0.05% Fluocinonide (bulk) in castor oil , apply to scalp qd\\nClobetasol ointment QHS
Continue Regimen: betamethasone dipropionate 0.05 % topical ointment: Apply to the scalp once weekly for itch and flake.\\n\\nketoconozole shampoo to scalp weekly
Continue Regimen: Apply to the eczema flares on the body BID x 2 weeks prn flares.

## 2024-06-27 NOTE — PROCEDURE: INTRALESIONAL KENALOG
Kenalog Type Of Vial: Multiple Dose
How Many Mls Were Removed From The 10 Mg/Ml (5ml) Vial When Preparing The Injectable Solution?: 0
Ndc# For Kenalog Only: [id number]
Kenalog Preparation: Kenalog
Validate Note Data When Using Inventory: Yes
Detail Level: Simple
Require Ndc Code?: No
Total Volume (Ccs): 1.0
Medical Necessity Clause: This procedure was medically necessary because the lesions that were treated were:
Concentration Of Kenalog Solution Injected (Mg/Ml): 4.0
Administered By (Optional): Naz Quinones
Consent: The risks of atrophy were reviewed with the patient.

## 2024-08-15 ENCOUNTER — APPOINTMENT (RX ONLY)
Dept: URBAN - METROPOLITAN AREA CLINIC 1 | Facility: CLINIC | Age: 59
Setting detail: DERMATOLOGY
End: 2024-08-15

## 2024-08-15 DIAGNOSIS — L65.9 NONSCARRING HAIR LOSS, UNSPECIFIED: ICD-10-CM

## 2024-08-15 PROCEDURE — ? INTRALESIONAL KENALOG

## 2024-08-15 PROCEDURE — ? PHOTO-DOCUMENTATION

## 2024-08-15 PROCEDURE — ? PRESCRIPTION MEDICATION MANAGEMENT

## 2024-08-15 PROCEDURE — ? COUNSELING

## 2024-08-15 PROCEDURE — 11901 INJECT SKIN LESIONS >7: CPT

## 2024-08-15 ASSESSMENT — LOCATION DETAILED DESCRIPTION DERM
LOCATION DETAILED: MID-FRONTAL SCALP
LOCATION DETAILED: RIGHT MEDIAL FRONTAL SCALP
LOCATION DETAILED: RIGHT CENTRAL FRONTAL SCALP
LOCATION DETAILED: LEFT CENTRAL FRONTAL SCALP
LOCATION DETAILED: RIGHT SUPERIOR PARIETAL SCALP
LOCATION DETAILED: RIGHT CENTRAL PARIETAL SCALP
LOCATION DETAILED: POSTERIOR MID-PARIETAL SCALP
LOCATION DETAILED: LEFT CENTRAL PARIETAL SCALP
LOCATION DETAILED: SUPERIOR MID FOREHEAD
LOCATION DETAILED: RIGHT SUPERIOR FOREHEAD
LOCATION DETAILED: LEFT SUPERIOR PARIETAL SCALP
LOCATION DETAILED: LEFT MEDIAL FRONTAL SCALP

## 2024-08-15 ASSESSMENT — LOCATION SIMPLE DESCRIPTION DERM
LOCATION SIMPLE: POSTERIOR SCALP
LOCATION SIMPLE: ANTERIOR SCALP
LOCATION SIMPLE: LEFT SCALP
LOCATION SIMPLE: SCALP
LOCATION SIMPLE: SUPERIOR FOREHEAD
LOCATION SIMPLE: RIGHT FOREHEAD
LOCATION SIMPLE: RIGHT SCALP

## 2024-08-15 ASSESSMENT — LOCATION ZONE DERM
LOCATION ZONE: FACE
LOCATION ZONE: SCALP

## 2024-08-15 NOTE — PROCEDURE: INTRALESIONAL KENALOG
Kenalog Type Of Vial: Multiple Dose
How Many Mls Were Removed From The 10 Mg/Ml (5ml) Vial When Preparing The Injectable Solution?: 0
Ndc# For Kenalog Only: [id number]
Kenalog Preparation: Kenalog
Validate Note Data When Using Inventory: Yes
Detail Level: Simple
Require Ndc Code?: No
Total Volume (Ccs): 1.0
Medical Necessity Clause: This procedure was medically necessary because the lesions that were treated were:
Concentration Of Kenalog Solution Injected (Mg/Ml): 4.0
Administered By (Optional): Naz Quinones
Consent: The risks of atrophy were reviewed with the patient.

## 2024-08-15 NOTE — PROCEDURE: PRESCRIPTION MEDICATION MANAGEMENT
Plan: S/P 8 months Minoxidil 8% compound
Detail Level: Zone
Render In Strict Bullet Format?: No
Continue Regimen: minoxidil 8%, 0.05% Fluocinonide (bulk) in castor oil , apply to scalp qd\\nClobetasol ointment QHS

## 2024-09-12 ENCOUNTER — APPOINTMENT (RX ONLY)
Dept: URBAN - METROPOLITAN AREA CLINIC 1 | Facility: CLINIC | Age: 59
Setting detail: DERMATOLOGY
End: 2024-09-12

## 2024-09-12 DIAGNOSIS — L65.9 NONSCARRING HAIR LOSS, UNSPECIFIED: ICD-10-CM

## 2024-09-12 PROCEDURE — ? INTRALESIONAL KENALOG

## 2024-09-12 PROCEDURE — ? DIAGNOSIS COMMENT

## 2024-09-12 PROCEDURE — ? PRESCRIPTION MEDICATION MANAGEMENT

## 2024-09-12 PROCEDURE — ? PHOTO-DOCUMENTATION

## 2024-09-12 PROCEDURE — ? COUNSELING

## 2024-09-12 PROCEDURE — 11901 INJECT SKIN LESIONS >7: CPT

## 2024-09-12 ASSESSMENT — LOCATION DETAILED DESCRIPTION DERM
LOCATION DETAILED: RIGHT SUPERIOR PARIETAL SCALP
LOCATION DETAILED: LEFT CENTRAL FRONTAL SCALP
LOCATION DETAILED: RIGHT CENTRAL FRONTAL SCALP
LOCATION DETAILED: LEFT SUPERIOR PARIETAL SCALP
LOCATION DETAILED: RIGHT MEDIAL FRONTAL SCALP
LOCATION DETAILED: LEFT CENTRAL PARIETAL SCALP

## 2024-09-12 ASSESSMENT — LOCATION ZONE DERM: LOCATION ZONE: SCALP

## 2024-09-12 ASSESSMENT — LOCATION SIMPLE DESCRIPTION DERM
LOCATION SIMPLE: SCALP
LOCATION SIMPLE: LEFT SCALP
LOCATION SIMPLE: RIGHT SCALP

## 2024-09-12 NOTE — PROCEDURE: DIAGNOSIS COMMENT
Render Risk Assessment In Note?: no
Detail Level: Simple
Comment: Intralesional Maintenance, Minoxidil 8% for 11 month. Pt is using clobetasol QHS

## 2024-09-12 NOTE — PROCEDURE: INTRALESIONAL KENALOG
Kenalog Type Of Vial: Multiple Dose
How Many Mls Were Removed From The 10 Mg/Ml (5ml) Vial When Preparing The Injectable Solution?: 0
Ndc# For Kenalog Only: 70321-541-39
Kenalog Preparation: Kenalog
Validate Note Data When Using Inventory: Yes
Detail Level: Simple
Require Ndc Code?: No
Total Volume (Ccs): 1.0
Medical Necessity Clause: This procedure was medically necessary because the lesions that were treated were:
Concentration Of Kenalog Solution Injected (Mg/Ml): 4.0
Administered By (Optional): Naz Quinones
Consent: The risks of atrophy were reviewed with the patient.

## 2024-10-05 ENCOUNTER — HOSPITAL ENCOUNTER (OUTPATIENT)
Facility: HOSPITAL | Age: 59
Discharge: HOME | End: 2024-10-08
Attending: INTERNAL MEDICINE
Payer: COMMERCIAL

## 2024-10-05 DIAGNOSIS — C18.7 MALIGNANT NEOPLASM OF SIGMOID COLON (HCC): ICD-10-CM

## 2024-10-05 DIAGNOSIS — D50.9 IRON DEFICIENCY ANEMIA, UNSPECIFIED IRON DEFICIENCY ANEMIA TYPE: ICD-10-CM

## 2024-10-05 PROCEDURE — 3430000000 HC RX DIAGNOSTIC RADIOPHARMACEUTICAL: Performed by: INTERNAL MEDICINE

## 2024-10-05 PROCEDURE — A9609 HC RX DIAGNOSTIC RADIOPHARMACEUTICAL: HCPCS | Performed by: INTERNAL MEDICINE

## 2024-10-05 PROCEDURE — 78815 PET IMAGE W/CT SKULL-THIGH: CPT

## 2024-10-05 RX ORDER — FLUDEOXYGLUCOSE F-18 500 MCI/ML
13.13 INJECTION INTRAVENOUS
Status: COMPLETED | OUTPATIENT
Start: 2024-10-05 | End: 2024-10-05

## 2024-10-05 RX ADMIN — FLUDEOXYGLUCOSE F-18 13.13 MILLICURIE: 500 INJECTION INTRAVENOUS at 12:36

## 2024-10-17 ENCOUNTER — APPOINTMENT (RX ONLY)
Dept: URBAN - METROPOLITAN AREA CLINIC 1 | Facility: CLINIC | Age: 59
Setting detail: DERMATOLOGY
End: 2024-10-17

## 2024-10-17 DIAGNOSIS — L21.8 OTHER SEBORRHEIC DERMATITIS: ICD-10-CM

## 2024-10-17 DIAGNOSIS — L65.9 NONSCARRING HAIR LOSS, UNSPECIFIED: ICD-10-CM

## 2024-10-17 DIAGNOSIS — L82.1 OTHER SEBORRHEIC KERATOSIS: ICD-10-CM

## 2024-10-17 DIAGNOSIS — L82.0 INFLAMED SEBORRHEIC KERATOSIS: ICD-10-CM

## 2024-10-17 PROCEDURE — 99213 OFFICE O/P EST LOW 20 MIN: CPT | Mod: 25

## 2024-10-17 PROCEDURE — ? PRESCRIPTION

## 2024-10-17 PROCEDURE — ? PHOTO-DOCUMENTATION

## 2024-10-17 PROCEDURE — ? COUNSELING

## 2024-10-17 PROCEDURE — ? PRESCRIPTION MEDICATION MANAGEMENT

## 2024-10-17 PROCEDURE — ? DIAGNOSIS COMMENT

## 2024-10-17 PROCEDURE — 11901 INJECT SKIN LESIONS >7: CPT

## 2024-10-17 PROCEDURE — ? INTRALESIONAL KENALOG

## 2024-10-17 RX ORDER — KETOCONAZOLE 20 MG/ML
SHAMPOO, SUSPENSION TOPICAL
Qty: 120 | Refills: 3 | Status: ERX

## 2024-10-17 RX ORDER — LIDOCAINE AND PRILOCAINE 25; 25 MG/G; MG/G
CREAM TOPICAL
Qty: 30 | Refills: 1 | Status: ERX | COMMUNITY
Start: 2024-10-17

## 2024-10-17 RX ADMIN — LIDOCAINE AND PRILOCAINE: 25; 25 CREAM TOPICAL at 00:00

## 2024-10-17 ASSESSMENT — LOCATION ZONE DERM
LOCATION ZONE: SCALP
LOCATION ZONE: LEG
LOCATION ZONE: NECK

## 2024-10-17 ASSESSMENT — LOCATION DETAILED DESCRIPTION DERM
LOCATION DETAILED: LEFT INFERIOR LATERAL NECK
LOCATION DETAILED: RIGHT CENTRAL FRONTAL SCALP
LOCATION DETAILED: RIGHT SUPERIOR PARIETAL SCALP
LOCATION DETAILED: LEFT CENTRAL PARIETAL SCALP
LOCATION DETAILED: LEFT CENTRAL FRONTAL SCALP
LOCATION DETAILED: RIGHT INFERIOR POSTERIOR NECK
LOCATION DETAILED: RIGHT MEDIAL FRONTAL SCALP
LOCATION DETAILED: LEFT PROXIMAL PRETIBIAL REGION
LOCATION DETAILED: LEFT SUPERIOR PARIETAL SCALP

## 2024-10-17 ASSESSMENT — LOCATION SIMPLE DESCRIPTION DERM
LOCATION SIMPLE: SCALP
LOCATION SIMPLE: POSTERIOR NECK
LOCATION SIMPLE: LEFT PRETIBIAL REGION
LOCATION SIMPLE: LEFT ANTERIOR NECK
LOCATION SIMPLE: RIGHT SCALP
LOCATION SIMPLE: LEFT SCALP

## 2024-10-17 NOTE — PROCEDURE: PRESCRIPTION MEDICATION MANAGEMENT
Plan: S/P 8 months Minoxidil 8% compound
Detail Level: Zone
Render In Strict Bullet Format?: No
Continue Regimen: minoxidil 8%, 0.05% Fluocinonide (bulk) in castor oil , apply to scalp qd\\nClobetasol ointment QHS
Continue Regimen: ketoconazole 2 % shampoo \\nQuantity: 120.0 ml  Days Supply: 30\\nSig: Wash scalp weekly- biweekly,let sit for 5 mins rinse and follow up with a conditioner
Initiate Treatment: lidocaine-prilocaine 2.5 %-2.5 % topical cream \\nQuantity: 30.0 g  Days Supply: 30\\nSig: Apply generous amount to affected areas one hour prior to appt, cover with Plastic wrap and come into office

## 2024-10-17 NOTE — PROCEDURE: INTRALESIONAL KENALOG
Kenalog Type Of Vial: Multiple Dose
How Many Mls Were Removed From The 10 Mg/Ml (5ml) Vial When Preparing The Injectable Solution?: 0
Kenalog Preparation: Kenalog
Validate Note Data When Using Inventory: Yes
Detail Level: Simple
Require Ndc Code?: No
Total Volume (Ccs): 1.0
Medical Necessity Clause: This procedure was medically necessary because the lesions that were treated were:
Concentration Of Kenalog Solution Injected (Mg/Ml): 4.0
Administered By (Optional): Naz Quinones MD
Consent: The risks of atrophy were reviewed with the patient.

## 2024-10-17 NOTE — PROCEDURE: DIAGNOSIS COMMENT
Render Risk Assessment In Note?: no
Detail Level: Simple
Comment: Intralesional Maintenance, Minoxidil 8% for 12 month. Pt is using clobetasol QHS. S/p 1 year Nutrafol supplements.

## 2024-11-13 ENCOUNTER — APPOINTMENT (RX ONLY)
Dept: URBAN - METROPOLITAN AREA CLINIC 1 | Facility: CLINIC | Age: 59
Setting detail: DERMATOLOGY
End: 2024-11-13

## 2024-11-13 DIAGNOSIS — L65.9 NONSCARRING HAIR LOSS, UNSPECIFIED: ICD-10-CM

## 2024-11-13 DIAGNOSIS — L72.8 OTHER FOLLICULAR CYSTS OF THE SKIN AND SUBCUTANEOUS TISSUE: ICD-10-CM

## 2024-11-13 DIAGNOSIS — L82.0 INFLAMED SEBORRHEIC KERATOSIS: ICD-10-CM

## 2024-11-13 PROCEDURE — ? INTRALESIONAL KENALOG

## 2024-11-13 PROCEDURE — 17110 DESTRUCTION B9 LES UP TO 14: CPT

## 2024-11-13 PROCEDURE — ? PRESCRIPTION

## 2024-11-13 PROCEDURE — ? COUNSELING

## 2024-11-13 PROCEDURE — ? BENIGN DESTRUCTION

## 2024-11-13 PROCEDURE — ? DIAGNOSIS COMMENT

## 2024-11-13 PROCEDURE — 11901 INJECT SKIN LESIONS >7: CPT | Mod: 59

## 2024-11-13 PROCEDURE — ? PRESCRIPTION MEDICATION MANAGEMENT

## 2024-11-13 PROCEDURE — ? PHOTO-DOCUMENTATION

## 2024-11-13 PROCEDURE — 99212 OFFICE O/P EST SF 10 MIN: CPT | Mod: 25

## 2024-11-13 RX ORDER — LIDOCAINE AND PRILOCAINE 25; 25 MG/G; MG/G
CREAM TOPICAL
Qty: 30 | Refills: 0 | Status: ERX

## 2024-11-13 ASSESSMENT — LOCATION DETAILED DESCRIPTION DERM
LOCATION DETAILED: LEFT CENTRAL FRONTAL SCALP
LOCATION DETAILED: LEFT SUPERIOR PARIETAL SCALP
LOCATION DETAILED: RIGHT SUPERIOR ANTERIOR NECK
LOCATION DETAILED: LEFT CENTRAL PARIETAL SCALP
LOCATION DETAILED: LEFT LATERAL TRAPEZIAL NECK
LOCATION DETAILED: RIGHT LATERAL TRAPEZIAL NECK
LOCATION DETAILED: LEFT SUPERIOR LATERAL NECK
LOCATION DETAILED: LEFT POSTERIOR SHOULDER
LOCATION DETAILED: RIGHT MEDIAL FRONTAL SCALP
LOCATION DETAILED: RIGHT SUPERIOR LATERAL NECK
LOCATION DETAILED: RIGHT SUPERIOR PARIETAL SCALP
LOCATION DETAILED: LEFT INFERIOR LATERAL NECK
LOCATION DETAILED: RIGHT CENTRAL FRONTAL SCALP
LOCATION DETAILED: RIGHT SUPERIOR UPPER BACK

## 2024-11-13 ASSESSMENT — LOCATION SIMPLE DESCRIPTION DERM
LOCATION SIMPLE: RIGHT ANTERIOR NECK
LOCATION SIMPLE: LEFT SHOULDER
LOCATION SIMPLE: RIGHT UPPER BACK
LOCATION SIMPLE: LEFT ANTERIOR NECK
LOCATION SIMPLE: SCALP
LOCATION SIMPLE: RIGHT SCALP
LOCATION SIMPLE: POSTERIOR NECK
LOCATION SIMPLE: LEFT SCALP

## 2024-11-13 ASSESSMENT — LOCATION ZONE DERM
LOCATION ZONE: NECK
LOCATION ZONE: TRUNK
LOCATION ZONE: SCALP
LOCATION ZONE: ARM

## 2024-11-13 NOTE — PROCEDURE: DIAGNOSIS COMMENT
Detail Level: Simple
Render Risk Assessment In Note?: no
Comment: S/P 12 months Minoxidil 8% compound.

## 2024-11-13 NOTE — PROCEDURE: BENIGN DESTRUCTION
Medical Necessity Information: It is in your best interest to select a reason for this procedure from the list below. All of these items fulfill various CMS LCD requirements except the new and changing color options.
Add 52 Modifier (Optional): no
Medical Necessity Clause: This procedure was medically necessary because the lesions that were treated were:
Consent: The patient's verbal consent was obtained including but not limited to risks of crusting, scabbing, blistering, scarring, darker or lighter pigmentary change, recurrence, incomplete removal and infection.
Treatment Number (Will Not Render If 0): 0
Detail Level: Detailed
Post-Care Instructions: I reviewed with the patient in detail post-care instructions. Patient is to wear sunprotection, and avoid picking at any of the treated lesions. Pt may apply Vaseline to crusted or scabbing areas.

## 2024-11-13 NOTE — PROCEDURE: PRESCRIPTION MEDICATION MANAGEMENT
Plan: S/P 12 months Minoxidil 8% compound
Detail Level: Zone
Render In Strict Bullet Format?: No
Continue Regimen: minoxidil 8%, 0.05% Fluocinonide (bulk) in castor oil , apply to scalp qd\\nClobetasol ointment QHS
Continue Regimen: lidocaine-prilocaine 2.5 %-2.5 % topical cream \\nQuantity: 30.0 g  Days Supply: 30\\nSig: Apply generous amount to affected areas one hour prior to appt, cover with Plastic wrap and come into office

## 2025-02-13 ENCOUNTER — APPOINTMENT (OUTPATIENT)
Dept: URBAN - METROPOLITAN AREA CLINIC 1 | Facility: CLINIC | Age: 60
Setting detail: DERMATOLOGY
End: 2025-02-13

## 2025-02-13 DIAGNOSIS — L72.8 OTHER FOLLICULAR CYSTS OF THE SKIN AND SUBCUTANEOUS TISSUE: ICD-10-CM

## 2025-02-13 DIAGNOSIS — L82.0 INFLAMED SEBORRHEIC KERATOSIS: ICD-10-CM

## 2025-02-13 DIAGNOSIS — L65.9 NONSCARRING HAIR LOSS, UNSPECIFIED: ICD-10-CM

## 2025-02-13 PROCEDURE — ? DIAGNOSIS COMMENT

## 2025-02-13 PROCEDURE — ? PHOTO-DOCUMENTATION

## 2025-02-13 PROCEDURE — ? PRESCRIPTION

## 2025-02-13 PROCEDURE — ? PRESCRIPTION MEDICATION MANAGEMENT

## 2025-02-13 PROCEDURE — 11901 INJECT SKIN LESIONS >7: CPT

## 2025-02-13 PROCEDURE — 99213 OFFICE O/P EST LOW 20 MIN: CPT | Mod: 25

## 2025-02-13 PROCEDURE — ? COUNSELING

## 2025-02-13 PROCEDURE — ? INTRALESIONAL KENALOG

## 2025-02-13 RX ORDER — MINOXIDIL
POWDER (GRAM) MISCELLANEOUS
Qty: 20 | Refills: 1 | Status: ERX | COMMUNITY
Start: 2025-02-13

## 2025-02-13 RX ORDER — METFORMIN HCL 100 %
POWDER (GRAM) MISCELLANEOUS
Qty: 20 | Refills: 2 | Status: ERX | COMMUNITY
Start: 2025-02-13

## 2025-02-13 RX ADMIN — Medication: at 00:00

## 2025-02-13 ASSESSMENT — LOCATION DETAILED DESCRIPTION DERM
LOCATION DETAILED: RIGHT CENTRAL FRONTAL SCALP
LOCATION DETAILED: RIGHT CHIN
LOCATION DETAILED: LEFT SUPERIOR PARIETAL SCALP
LOCATION DETAILED: LEFT CENTRAL PARIETAL SCALP
LOCATION DETAILED: RIGHT MEDIAL FRONTAL SCALP
LOCATION DETAILED: LEFT INFERIOR LATERAL NECK
LOCATION DETAILED: RIGHT SUPERIOR PARIETAL SCALP
LOCATION DETAILED: RIGHT SUPERIOR UPPER BACK
LOCATION DETAILED: LEFT CENTRAL FRONTAL SCALP

## 2025-02-13 ASSESSMENT — LOCATION SIMPLE DESCRIPTION DERM
LOCATION SIMPLE: LEFT ANTERIOR NECK
LOCATION SIMPLE: RIGHT UPPER BACK
LOCATION SIMPLE: LEFT SCALP
LOCATION SIMPLE: RIGHT SCALP
LOCATION SIMPLE: SCALP
LOCATION SIMPLE: CHIN

## 2025-02-13 ASSESSMENT — LOCATION ZONE DERM
LOCATION ZONE: SCALP
LOCATION ZONE: NECK
LOCATION ZONE: FACE
LOCATION ZONE: TRUNK

## 2025-02-13 NOTE — PROCEDURE: DIAGNOSIS COMMENT
Detail Level: Simple
Render Risk Assessment In Note?: no
Comment: S/P 1 year and 2 months Minoxidil 8% compound. Will be adding rosemary oil and metformin cream \\nMaintenance ILK injections

## 2025-02-13 NOTE — PROCEDURE: INTRALESIONAL KENALOG
Kenalog Type Of Vial: Multiple Dose
How Many Mls Were Removed From The 10 Mg/Ml (5ml) Vial When Preparing The Injectable Solution?: 0
Ndc# For Kenalog Only: 96871-718-84
Kenalog Preparation: Kenalog
Lot # For Kenalog (Optional): [id number]
Validate Note Data When Using Inventory: Yes
Detail Level: Simple
Require Ndc Code?: No
Total Volume (Ccs): 1.0
Medical Necessity Clause: This procedure was medically necessary because the lesions that were treated were:
Concentration Of Kenalog Solution Injected (Mg/Ml): 4.0
Administered By (Optional): Naz Quinones MD
Expiration Date For Kenalog (Optional): 05-
Consent: The risks of atrophy were reviewed with the patient.

## 2025-02-13 NOTE — PROCEDURE: PRESCRIPTION MEDICATION MANAGEMENT
Detail Level: Zone
Initiate Treatment: metformin (bulk) 100 % powder \\nSig: Metformin 10% cream- Apply to scalp BID
Modify Regimen: minoxidil 8%, 0.05% Fluocinonide (bulk) in castor oil , apply to scalp qd  adding rosemary oil
Render In Strict Bullet Format?: No
Continue Regimen: Clobetasol ointment QHS
Continue Regimen: lidocaine-prilocaine 2.5 %-2.5 % topical cream \\nQuantity: 30.0 g  Days Supply: 30\\nSig: Apply generous amount to affected areas one hour prior to appt, cover with Plastic wrap and come into office

## 2025-03-14 ENCOUNTER — APPOINTMENT (OUTPATIENT)
Dept: URBAN - METROPOLITAN AREA CLINIC 1 | Facility: CLINIC | Age: 60
Setting detail: DERMATOLOGY
End: 2025-03-14

## 2025-03-14 DIAGNOSIS — L65.9 NONSCARRING HAIR LOSS, UNSPECIFIED: ICD-10-CM | Status: INADEQUATELY CONTROLLED

## 2025-03-14 DIAGNOSIS — L21.8 OTHER SEBORRHEIC DERMATITIS: ICD-10-CM | Status: STABLE

## 2025-03-14 PROCEDURE — ? MDM - TREATMENT GOALS

## 2025-03-14 PROCEDURE — ? PRESCRIPTION MEDICATION MANAGEMENT

## 2025-03-14 PROCEDURE — 11901 INJECT SKIN LESIONS >7: CPT

## 2025-03-14 PROCEDURE — 99213 OFFICE O/P EST LOW 20 MIN: CPT | Mod: 25

## 2025-03-14 PROCEDURE — ? PRESCRIPTION

## 2025-03-14 PROCEDURE — ? PHOTO-DOCUMENTATION

## 2025-03-14 PROCEDURE — ? DIAGNOSIS COMMENT

## 2025-03-14 PROCEDURE — ? COUNSELING

## 2025-03-14 PROCEDURE — ? INTRALESIONAL KENALOG

## 2025-03-14 RX ORDER — METFORMIN HCL 100 %
POWDER (GRAM) MISCELLANEOUS
Qty: 20 | Refills: 2 | Status: ERX

## 2025-03-14 RX ORDER — MINOXIDIL
POWDER (GRAM) MISCELLANEOUS
Qty: 20 | Refills: 1 | Status: ERX

## 2025-03-14 ASSESSMENT — LOCATION DETAILED DESCRIPTION DERM
LOCATION DETAILED: RIGHT SUPERIOR PARIETAL SCALP
LOCATION DETAILED: LEFT CENTRAL FRONTAL SCALP
LOCATION DETAILED: RIGHT CENTRAL FRONTAL SCALP
LOCATION DETAILED: RIGHT MEDIAL FRONTAL SCALP
LOCATION DETAILED: LEFT CENTRAL PARIETAL SCALP
LOCATION DETAILED: LEFT SUPERIOR PARIETAL SCALP

## 2025-03-14 ASSESSMENT — LOCATION SIMPLE DESCRIPTION DERM
LOCATION SIMPLE: SCALP
LOCATION SIMPLE: LEFT SCALP
LOCATION SIMPLE: RIGHT SCALP

## 2025-03-14 ASSESSMENT — LOCATION ZONE DERM: LOCATION ZONE: SCALP

## 2025-03-14 NOTE — PROCEDURE: DIAGNOSIS COMMENT
Detail Level: Simple
Render Risk Assessment In Note?: no
Comment: S/P over 1 year Minoxidil 8% compound. S/p 1 month Minoxidil 8% with Rosemary oil.\\nS/p 1 month Metformin 10%\\nMaintenance ILK injections

## 2025-03-14 NOTE — PROCEDURE: PRESCRIPTION MEDICATION MANAGEMENT
Detail Level: Zone
Render In Strict Bullet Format?: No
Continue Regimen: Clobetasol ointment QHS\\n\\nmetformin (bulk) 100 % powder \\nSig: Metformin 10% cream- Apply to scalp BID\\n\\nminoxidil 8%, 0.05% Fluocinonide (bulk) in castor oil , apply to scalp qd  adding rosemary oil
Continue Regimen: ketoconazole 2 % shampoo \\nQuantity: 120.0 ml  Days Supply: 30\\nSig: Wash scalp weekly- biweekly,let sit for 5 mins rinse and follow up with a conditioner

## 2025-03-14 NOTE — PROCEDURE: INTRALESIONAL KENALOG
Kenalog Type Of Vial: Multiple Dose
How Many Mls Were Removed From The 10 Mg/Ml (5ml) Vial When Preparing The Injectable Solution?: 0
Ndc# For Kenalog Only: 36145-763-61
Which Kenalog Vial Was Used?: Kenalog 40 mg/ml (5 ml vial)
Kenalog Preparation: Kenalog with 1% lidocaine with epinephrine
Lot # For Kenalog (Optional): [id number]
Validate Note Data When Using Inventory: Yes
Detail Level: Zone
Require Ndc Code?: No
Total Volume (Ccs): 1.0
Medical Necessity Clause: This procedure was medically necessary because the lesions that were treated were:
Concentration Of Kenalog Solution Injected (Mg/Ml): 4.0
Administered By (Optional): Naz Quinones MD
Expiration Date For Kenalog (Optional): 05-
Consent: The risks of atrophy were reviewed with the patient.

## 2025-04-18 ENCOUNTER — APPOINTMENT (OUTPATIENT)
Dept: URBAN - METROPOLITAN AREA CLINIC 1 | Facility: CLINIC | Age: 60
Setting detail: DERMATOLOGY
End: 2025-04-18

## 2025-04-18 DIAGNOSIS — L72.8 OTHER FOLLICULAR CYSTS OF THE SKIN AND SUBCUTANEOUS TISSUE: ICD-10-CM | Status: INADEQUATELY CONTROLLED

## 2025-04-18 DIAGNOSIS — L21.8 OTHER SEBORRHEIC DERMATITIS: ICD-10-CM | Status: IMPROVED

## 2025-04-18 DIAGNOSIS — L65.9 NONSCARRING HAIR LOSS, UNSPECIFIED: ICD-10-CM | Status: INADEQUATELY CONTROLLED

## 2025-04-18 DIAGNOSIS — L20.89 OTHER ATOPIC DERMATITIS: ICD-10-CM | Status: STABLE

## 2025-04-18 PROCEDURE — ? PHOTO-DOCUMENTATION

## 2025-04-18 PROCEDURE — ? PRESCRIPTION MEDICATION MANAGEMENT

## 2025-04-18 PROCEDURE — 11901 INJECT SKIN LESIONS >7: CPT

## 2025-04-18 PROCEDURE — ? COUNSELING

## 2025-04-18 PROCEDURE — ? INTRALESIONAL KENALOG

## 2025-04-18 PROCEDURE — ? PRESCRIPTION

## 2025-04-18 PROCEDURE — 99214 OFFICE O/P EST MOD 30 MIN: CPT | Mod: 25

## 2025-04-18 PROCEDURE — ? DIAGNOSIS COMMENT

## 2025-04-18 PROCEDURE — ? DEFER

## 2025-04-18 PROCEDURE — ? MDM - TREATMENT GOALS

## 2025-04-18 RX ORDER — METFORMIN HCL 100 %
POWDER (GRAM) MISCELLANEOUS
Qty: 20 | Refills: 2 | Status: ERX

## 2025-04-18 RX ORDER — AMOXICILLIN 500 MG/1
CAPSULE ORAL
Qty: 12 | Refills: 0 | Status: ERX | COMMUNITY
Start: 2025-04-18

## 2025-04-18 RX ORDER — MINOXIDIL
POWDER (GRAM) MISCELLANEOUS
Qty: 50 | Refills: 2 | Status: ERX | COMMUNITY
Start: 2025-04-18

## 2025-04-18 RX ORDER — CLOBETASOL PROPIONATE 0.5 MG/G
CREAM TOPICAL
Qty: 30 | Refills: 2 | Status: ERX

## 2025-04-18 RX ADMIN — AMOXICILLIN: 500 CAPSULE ORAL at 00:00

## 2025-04-18 RX ADMIN — Medication: at 00:00

## 2025-04-18 ASSESSMENT — LOCATION DETAILED DESCRIPTION DERM
LOCATION DETAILED: LEFT RADIAL DORSAL HAND
LOCATION DETAILED: LEFT CENTRAL PARIETAL SCALP
LOCATION DETAILED: LEFT SUPERIOR PARIETAL SCALP
LOCATION DETAILED: LEFT CENTRAL FRONTAL SCALP
LOCATION DETAILED: RIGHT POSTERIOR SHOULDER
LOCATION DETAILED: RIGHT SUPERIOR PARIETAL SCALP
LOCATION DETAILED: RIGHT CENTRAL FRONTAL SCALP
LOCATION DETAILED: RIGHT MEDIAL FRONTAL SCALP

## 2025-04-18 ASSESSMENT — LOCATION SIMPLE DESCRIPTION DERM
LOCATION SIMPLE: RIGHT SHOULDER
LOCATION SIMPLE: RIGHT SCALP
LOCATION SIMPLE: LEFT SCALP
LOCATION SIMPLE: LEFT HAND
LOCATION SIMPLE: SCALP

## 2025-04-18 ASSESSMENT — LOCATION ZONE DERM
LOCATION ZONE: ARM
LOCATION ZONE: SCALP
LOCATION ZONE: HAND

## 2025-04-18 NOTE — PROCEDURE: DIAGNOSIS COMMENT
Detail Level: Simple
Render Risk Assessment In Note?: no
Comment: S/P over 1 year Minoxidil 8% compound. S/p 2 months Minoxidil 8% with Rosemary oil. Added spironolactone and Clobetasol ointment to compound today.\\nS/p 2 months Metformin 10%\\nMaintenance ILK injections every month due to flares.
Comment: Patient takes Amoxicillin before going to the dentist. Rx sent. 6mm punch + 15 blade.

## 2025-04-18 NOTE — PROCEDURE: DEFER
Size Of Lesion In Cm (Optional): 0
Procedure To Be Performed At Next Visit: Excision
Introduction Text (Please End With A Colon): The following procedure was deferred:
Detail Level: Zone

## 2025-04-18 NOTE — PROCEDURE: INTRALESIONAL KENALOG
Kenalog Type Of Vial: Multiple Dose
How Many Mls Were Removed From The 10 Mg/Ml (5ml) Vial When Preparing The Injectable Solution?: 0
Ndc# For Kenalog Only: 79604-497-75
Which Kenalog Vial Was Used?: Kenalog 40 mg/ml (5 ml vial)
Kenalog Preparation: Kenalog with 1% lidocaine with epinephrine
Lot # For Kenalog (Optional): [id number]
Validate Note Data When Using Inventory: Yes
Detail Level: Zone
Require Ndc Code?: No
Total Volume (Ccs): 1.0
Medical Necessity Clause: This procedure was medically necessary because the lesions that were treated were:
Concentration Of Kenalog Solution Injected (Mg/Ml): 4.0
Administered By (Optional): Naz Quinones MD
Expiration Date For Kenalog (Optional): 05-
Consent: The risks of atrophy were reviewed with the patient.

## 2025-04-18 NOTE — PROCEDURE: PRESCRIPTION MEDICATION MANAGEMENT
Detail Level: Zone
Initiate Treatment: minoxidil (bulk) powder \\nQuantity: 50.0 g\\nSig: Mix minoxidil 8%, rosemary oil, Spironolactone 1%, 50g of Clobetasol 0.05% ointment- apply to the scalp QHS
Render In Strict Bullet Format?: No
Continue Regimen: metformin (bulk) 100 % powder \\nSig: Metformin 10% cream- Apply to scalp BID
Discontinue Regimen: minoxidil 8%, 0.05% Fluocinonide (bulk) in castor oil , apply to scalp qd  adding rosemary oil
Continue Regimen: ketoconazole 2 % shampoo \\nQuantity: 120.0 ml  Days Supply: 30\\nSig: Wash scalp weekly- biweekly,let sit for 5 mins rinse and follow up with a conditioner
Continue Regimen: Apply clobetasol 0.05 % to the eczema flares on the body BID x 2 weeks prn flares.
Initiate Treatment: amoxicillin 500 mg capsule \\nQuantity: 12.0 Capsule\\nSig: Take 4 capsule PO 1hr prior to appointment

## 2025-07-09 ENCOUNTER — APPOINTMENT (OUTPATIENT)
Dept: URBAN - METROPOLITAN AREA CLINIC 1 | Facility: CLINIC | Age: 60
Setting detail: DERMATOLOGY
End: 2025-07-09

## 2025-07-09 DIAGNOSIS — L20.89 OTHER ATOPIC DERMATITIS: ICD-10-CM | Status: STABLE

## 2025-07-09 DIAGNOSIS — L65.9 NONSCARRING HAIR LOSS, UNSPECIFIED: ICD-10-CM | Status: STABLE

## 2025-07-09 DIAGNOSIS — L72.8 OTHER FOLLICULAR CYSTS OF THE SKIN AND SUBCUTANEOUS TISSUE: ICD-10-CM | Status: STABLE

## 2025-07-09 DIAGNOSIS — L21.8 OTHER SEBORRHEIC DERMATITIS: ICD-10-CM | Status: WELL CONTROLLED

## 2025-07-09 PROCEDURE — ? INTRALESIONAL KENALOG

## 2025-07-09 PROCEDURE — ? PRESCRIPTION

## 2025-07-09 PROCEDURE — ? PHOTO-DOCUMENTATION

## 2025-07-09 PROCEDURE — ? MDM - TREATMENT GOALS

## 2025-07-09 PROCEDURE — ? DEFER

## 2025-07-09 PROCEDURE — ? PRESCRIPTION MEDICATION MANAGEMENT

## 2025-07-09 PROCEDURE — ? COUNSELING

## 2025-07-09 PROCEDURE — ? DIAGNOSIS COMMENT

## 2025-07-09 RX ORDER — MINOXIDIL
POWDER (GRAM) MISCELLANEOUS
Qty: 118 | Refills: 2 | Status: ERX | COMMUNITY
Start: 2025-07-09

## 2025-07-09 RX ORDER — LIDOCAINE AND PRILOCAINE 25; 25 MG/G; MG/G
CREAM TOPICAL
Qty: 60 | Refills: 2 | Status: ERX

## 2025-07-09 RX ADMIN — Medication: at 00:00

## 2025-07-09 ASSESSMENT — LOCATION DETAILED DESCRIPTION DERM
LOCATION DETAILED: RIGHT CENTRAL FRONTAL SCALP
LOCATION DETAILED: RIGHT POSTERIOR SHOULDER
LOCATION DETAILED: LEFT CENTRAL FRONTAL SCALP
LOCATION DETAILED: LEFT CENTRAL PARIETAL SCALP
LOCATION DETAILED: RIGHT ULNAR DORSAL HAND
LOCATION DETAILED: RIGHT MEDIAL FRONTAL SCALP
LOCATION DETAILED: RIGHT SUPERIOR PARIETAL SCALP
LOCATION DETAILED: LEFT SUPERIOR PARIETAL SCALP

## 2025-07-09 ASSESSMENT — LOCATION SIMPLE DESCRIPTION DERM
LOCATION SIMPLE: LEFT SCALP
LOCATION SIMPLE: SCALP
LOCATION SIMPLE: RIGHT SCALP
LOCATION SIMPLE: RIGHT HAND
LOCATION SIMPLE: RIGHT SHOULDER

## 2025-07-09 ASSESSMENT — LOCATION ZONE DERM
LOCATION ZONE: SCALP
LOCATION ZONE: HAND
LOCATION ZONE: ARM

## 2025-07-09 ASSESSMENT — SEVERITY ASSESSMENT: HOW SEVERE IS THIS PATIENT'S CONDITION?: CLEAR

## 2025-07-09 NOTE — PROCEDURE: PRESCRIPTION MEDICATION MANAGEMENT
Detail Level: Zone
Initiate Treatment: minoxidil (bulk) powder \\nQuantity: 50.0 g\\nSig: Mix minoxidil 8%, rosemary oil, Spironolactone 1%, 118 mL of Dermasmoothe 0.01% oil- apply to the scalp BID\\n\\nlidocaine-prilocaine 2.5 %-2.5 % topical cream \\nQuantity: 60.0 g  Days Supply: 30\\nSig: Apply a thick layer to scalp one hour before appointment.
Render In Strict Bullet Format?: No
Continue Regimen: metformin (bulk) 100 % powder \\nSig: Metformin 10% cream- Apply to scalp BID
Discontinue Regimen: minoxidil 8%, rosemary oil, Spironolactone 1%, Clobetasol ointment
Continue Regimen: ketoconazole 2 % shampoo \\nQuantity: 120.0 ml  Days Supply: 30\\nSig: Wash scalp weekly- biweekly,let sit for 5 mins rinse and follow up with a conditioner
Continue Regimen: Apply clobetasol 0.05 % to the eczema flares on the body BID x 2 weeks prn flares.
Modify Regimen: Hold on amoxicillin 500 mg capsule- Take 4 capsule PO 1hr prior to appointment

## 2025-07-09 NOTE — PROCEDURE: DEFER
Size Of Lesion In Cm (Optional): 0
Procedure To Be Performed At Next Visit: Excision
Introduction Text (Please End With A Colon): The following procedure was deferred:
Detail Level: Zone
Reason To Defer Override: Patient broke her right foot

## 2025-07-09 NOTE — PROCEDURE: DIAGNOSIS COMMENT
Detail Level: Simple
Render Risk Assessment In Note?: no
Comment: S/P over 1 year Minoxidil 8% compound. S/p 5 months Minoxidil 8% with Rosemary oil. S/p 3 months spironolactone and Clobetasol ointment. Makes hair hard. Switched back to DS oil. Increase to BID.\\nS/p 5 months Metformin 10%\\nPositive tenderness and itch. Maintenance ILK injections. Will f/u monthly.
Comment: Patient takes Amoxicillin before going to the dentist. Rx prev sent. 6mm punch + 15 blade. Hold on exc. Patient broke foot. Wears boot. Expected to come out in August.

## 2025-08-20 ENCOUNTER — APPOINTMENT (OUTPATIENT)
Dept: URBAN - METROPOLITAN AREA CLINIC 1 | Facility: CLINIC | Age: 60
Setting detail: DERMATOLOGY
End: 2025-08-20

## 2025-08-20 DIAGNOSIS — L72.8 OTHER FOLLICULAR CYSTS OF THE SKIN AND SUBCUTANEOUS TISSUE: ICD-10-CM

## 2025-08-20 DIAGNOSIS — L21.8 OTHER SEBORRHEIC DERMATITIS: ICD-10-CM

## 2025-08-20 DIAGNOSIS — L65.9 NONSCARRING HAIR LOSS, UNSPECIFIED: ICD-10-CM

## 2025-08-20 DIAGNOSIS — L20.89 OTHER ATOPIC DERMATITIS: ICD-10-CM

## 2025-08-20 PROCEDURE — ? INTRALESIONAL KENALOG

## 2025-08-20 PROCEDURE — ? MDM - TREATMENT GOALS

## 2025-08-20 PROCEDURE — ? PRESCRIPTION MEDICATION MANAGEMENT

## 2025-08-20 PROCEDURE — ? DIAGNOSIS COMMENT

## 2025-08-20 PROCEDURE — ? COUNSELING

## 2025-08-20 PROCEDURE — ? PHOTO-DOCUMENTATION

## 2025-08-20 PROCEDURE — ? DEFER

## 2025-08-20 ASSESSMENT — LOCATION ZONE DERM
LOCATION ZONE: HAND
LOCATION ZONE: SCALP
LOCATION ZONE: ARM

## 2025-08-20 ASSESSMENT — LOCATION SIMPLE DESCRIPTION DERM
LOCATION SIMPLE: RIGHT SHOULDER
LOCATION SIMPLE: SCALP
LOCATION SIMPLE: RIGHT SCALP
LOCATION SIMPLE: LEFT SCALP
LOCATION SIMPLE: RIGHT HAND

## 2025-08-20 ASSESSMENT — LOCATION DETAILED DESCRIPTION DERM
LOCATION DETAILED: LEFT CENTRAL FRONTAL SCALP
LOCATION DETAILED: LEFT SUPERIOR PARIETAL SCALP
LOCATION DETAILED: RIGHT ULNAR DORSAL HAND
LOCATION DETAILED: RIGHT SUPERIOR PARIETAL SCALP
LOCATION DETAILED: RIGHT MEDIAL FRONTAL SCALP
LOCATION DETAILED: RIGHT POSTERIOR SHOULDER
LOCATION DETAILED: RIGHT CENTRAL FRONTAL SCALP
LOCATION DETAILED: LEFT CENTRAL PARIETAL SCALP

## (undated) DEVICE — SUT 2/0 27 VIO MONO SH -- PDS PLUS

## (undated) DEVICE — STAPLER INT L75MM CUT LN L73MM STPL LN L77MM BLU B FRM 8

## (undated) DEVICE — BASIC SINGLE BASIN-LF: Brand: MEDLINE INDUSTRIES, INC.

## (undated) DEVICE — CLIP LIG M BLU TI HRT SHP WIRE HORZ 600 PER BX

## (undated) DEVICE — PENROSE DRAIN 18 X .5" SILICONE: Brand: MEDLINE

## (undated) DEVICE — SUT VCRL + 2-0 27IN SH UD --

## (undated) DEVICE — SOLUTION SURG PREP 26 CC PURPREP

## (undated) DEVICE — SURGICAL PROCEDURE PACK CYSTO CHS

## (undated) DEVICE — RELOAD STPL SZ 0 L45MM DIA3.5MM 0DEG STD REG TISS BLU TI

## (undated) DEVICE — DRESSING ADH N ADH 8X35 IN 6X175 IN SFT CLTH MEDIPORE +

## (undated) DEVICE — DRAPE IRRIG FLD WRM W44XL66IN W/ AORN STD PRTBL INTRATEMP

## (undated) DEVICE — SOLUTION IRRIG 500ML STRL H2O NONPYROGENIC

## (undated) DEVICE — SOLUTION IRRIG 3000ML 0.9% SOD CHL USP UROMATIC PLAS CONT

## (undated) DEVICE — LAPAROSCOPIC CHOLE PACK: Brand: MEDLINE INDUSTRIES, INC.

## (undated) DEVICE — BLADE ELECTRODE: Brand: EDGE

## (undated) DEVICE — INTENDED FOR TISSUE SEPARATION, AND OTHER PROCEDURES THAT REQUIRE A SHARP SURGICAL BLADE TO PUNCTURE OR CUT.: Brand: BARD-PARKER ® CARBON RIB-BACK BLADES

## (undated) DEVICE — SPONGE GZ W4XL4IN COT 12 PLY TYP VII WVN C FLD DSGN

## (undated) DEVICE — RELOAD STPL L75MM OPN H3.8MM CLS 1.5MM WIRE DIA0.2MM REG

## (undated) DEVICE — SOUTHSIDE TURNOVER: Brand: MEDLINE INDUSTRIES, INC.

## (undated) DEVICE — TOWEL SURG W17XL27IN STD BLU COT NONFENESTRATED PREWASHED

## (undated) DEVICE — DRAPE,REIN 53X77,STERILE: Brand: MEDLINE

## (undated) DEVICE — VINYL ACETATE UROLOGICAL DRAINAGE BAG FOR GE/OEC SYSTEMS W/ 8MM PLUG - NON-STERILE: Brand: CUSTOM MEDICAL SPECIALTIES, INC.

## (undated) DEVICE — GOWN,NON-REINFORCED,XXL: Brand: MEDLINE

## (undated) DEVICE — GLOVE ORANGE PI 7 1/2   MSG9075

## (undated) DEVICE — SOLUTION IRRIG 1000ML 0.9% SOD CHL USP POUR PLAS BTL

## (undated) DEVICE — RADIFOCUS GLIDEWIRE: Brand: GLIDEWIRE

## (undated) DEVICE — YANKAUER,BULB TIP,W/O VENT,RIGID,STERILE: Brand: MEDLINE

## (undated) DEVICE — COVER,MAYO STAND,STERILE: Brand: MEDLINE

## (undated) DEVICE — TUBING, SUCTION, 1/4" X 12', STRAIGHT: Brand: MEDLINE

## (undated) DEVICE — GOWN,PREVENTION PLUS,XLN/XL,ST,24/CS: Brand: MEDLINE

## (undated) DEVICE — STAPLER SKIN H3.9MM WIRE DIA0.58MM CRWN 6.9MM 35 STPL FIX

## (undated) DEVICE — SPONGE LAP 18X18IN STRL -- 5/PK

## (undated) DEVICE — STAPLER INT DIA29MM CLS STPL H1.5-2.2MM OPN LEG L5.2MM 26

## (undated) DEVICE — TOTAL TRAY, 16FR 10ML SIL FOLEY, URN: Brand: MEDLINE

## (undated) DEVICE — SOLUTION SCRB 4OZ 4% CHG H2O AIDED FOR PREOPERATIVE SKIN

## (undated) DEVICE — SYRINGE IRRIG 60ML SFT PLIABLE BLB EZ TO GRP 1 HND USE W/

## (undated) DEVICE — CUTTER ENDOSCP L340MM LIN ARTC SGL STROKE FIRING ENDOPATH

## (undated) DEVICE — CYSTO PACK: Brand: MEDLINE INDUSTRIES, INC.

## (undated) DEVICE — GARMENT,MEDLINE,DVT,INT,CALF,MED, GEN2: Brand: MEDLINE

## (undated) DEVICE — JELLY LUBRICATING 2 OZ SCREW-CAP MTL TUBE STRL SURGLUB

## (undated) DEVICE — STAPLER INT L60MM REG TISS BLU B FRM 8 FIRING 2 ROW AUTO

## (undated) DEVICE — SPONGE HEMOSTAT CELLULS 4X8IN -- SURGICEL

## (undated) DEVICE — SUTURE VCRL + SZ 3-0 L27IN ABSRB VLT SH 1/2 CIR TAPR PNT VCP784D

## (undated) DEVICE — PREP PAD BNS: Brand: CONVERTORS

## (undated) DEVICE — 3-0 COATED VICRYL PLUS UNDYED 1X27" SH --

## (undated) DEVICE — 3M™ SURGICAL CLIPPER WITH PIVOTING HEAD, 9660, 50/CASE: Brand: 3M™

## (undated) DEVICE — SPONGE LAP SFT 18X18 IN X RAY DETECTABLE

## (undated) DEVICE — LOTION PREP REMV 5OZ IODO CLR TINC OF BENZ DURAPREP

## (undated) DEVICE — STAPLER SKIN H3.9MM WIRE DIA0.58MM CRWN 6.9MM 35 STPL ROT